# Patient Record
Sex: FEMALE | Race: WHITE | NOT HISPANIC OR LATINO | Employment: OTHER | URBAN - METROPOLITAN AREA
[De-identification: names, ages, dates, MRNs, and addresses within clinical notes are randomized per-mention and may not be internally consistent; named-entity substitution may affect disease eponyms.]

---

## 2017-01-13 ENCOUNTER — TRANSFERRED RECORDS (OUTPATIENT)
Dept: HEALTH INFORMATION MANAGEMENT | Facility: CLINIC | Age: 32
End: 2017-01-13

## 2017-02-08 ENCOUNTER — APPOINTMENT (OUTPATIENT)
Dept: ULTRASOUND IMAGING | Facility: CLINIC | Age: 32
End: 2017-02-08
Attending: PHYSICIAN ASSISTANT
Payer: COMMERCIAL

## 2017-02-08 ENCOUNTER — HOSPITAL ENCOUNTER (EMERGENCY)
Facility: CLINIC | Age: 32
Discharge: HOME | End: 2017-02-08
Attending: PHYSICIAN ASSISTANT | Admitting: PHYSICIAN ASSISTANT
Payer: COMMERCIAL

## 2017-02-08 VITALS
HEIGHT: 64 IN | DIASTOLIC BLOOD PRESSURE: 82 MMHG | OXYGEN SATURATION: 98 % | HEART RATE: 76 BPM | RESPIRATION RATE: 15 BRPM | SYSTOLIC BLOOD PRESSURE: 117 MMHG | BODY MASS INDEX: 25.1 KG/M2 | TEMPERATURE: 98.4 F | WEIGHT: 147 LBS

## 2017-02-08 DIAGNOSIS — G89.29 CHRONIC PELVIC PAIN IN FEMALE: ICD-10-CM

## 2017-02-08 DIAGNOSIS — R10.2 CHRONIC PELVIC PAIN IN FEMALE: ICD-10-CM

## 2017-02-08 DIAGNOSIS — Z87.42 HX OF ENDOMETRIOSIS: ICD-10-CM

## 2017-02-08 LAB
ALBUMIN SERPL-MCNC: 3.6 G/DL (ref 3.4–5)
ALBUMIN UR-MCNC: NEGATIVE MG/DL
ALP SERPL-CCNC: 78 U/L (ref 40–150)
ALT SERPL W P-5'-P-CCNC: 18 U/L (ref 0–50)
ANION GAP SERPL CALCULATED.3IONS-SCNC: 9 MMOL/L (ref 3–14)
APPEARANCE UR: CLEAR
AST SERPL W P-5'-P-CCNC: 19 U/L (ref 0–45)
BASOPHILS # BLD AUTO: 0.1 10E9/L (ref 0–0.2)
BASOPHILS NFR BLD AUTO: 0.6 %
BILIRUB SERPL-MCNC: 0.2 MG/DL (ref 0.2–1.3)
BILIRUB UR QL STRIP: NEGATIVE
BUN SERPL-MCNC: 9 MG/DL (ref 7–30)
CALCIUM SERPL-MCNC: 8.8 MG/DL (ref 8.5–10.1)
CHLORIDE SERPL-SCNC: 106 MMOL/L (ref 94–109)
CO2 SERPL-SCNC: 25 MMOL/L (ref 20–32)
COLOR UR AUTO: YELLOW
CREAT SERPL-MCNC: 0.66 MG/DL (ref 0.52–1.04)
DIFFERENTIAL METHOD BLD: NORMAL
EOSINOPHIL # BLD AUTO: 0.1 10E9/L (ref 0–0.7)
EOSINOPHIL NFR BLD AUTO: 1.3 %
ERYTHROCYTE [DISTWIDTH] IN BLOOD BY AUTOMATED COUNT: 13.7 % (ref 10–15)
GFR SERPL CREATININE-BSD FRML MDRD: NORMAL ML/MIN/1.7M2
GLUCOSE SERPL-MCNC: 84 MG/DL (ref 70–99)
GLUCOSE UR STRIP-MCNC: NEGATIVE MG/DL
HCT VFR BLD AUTO: 42.2 % (ref 35–47)
HGB BLD-MCNC: 14.1 G/DL (ref 11.7–15.7)
HGB UR QL STRIP: ABNORMAL
IMM GRANULOCYTES # BLD: 0 10E9/L (ref 0–0.4)
IMM GRANULOCYTES NFR BLD: 0.2 %
KETONES UR STRIP-MCNC: NEGATIVE MG/DL
LEUKOCYTE ESTERASE UR QL STRIP: NEGATIVE
LIPASE SERPL-CCNC: 154 U/L (ref 73–393)
LYMPHOCYTES # BLD AUTO: 2.7 10E9/L (ref 0.8–5.3)
LYMPHOCYTES NFR BLD AUTO: 26.8 %
MCH RBC QN AUTO: 29.6 PG (ref 26.5–33)
MCHC RBC AUTO-ENTMCNC: 33.4 G/DL (ref 31.5–36.5)
MCV RBC AUTO: 89 FL (ref 78–100)
MONOCYTES # BLD AUTO: 0.7 10E9/L (ref 0–1.3)
MONOCYTES NFR BLD AUTO: 6.5 %
MUCOUS THREADS #/AREA URNS LPF: PRESENT /LPF
NEUTROPHILS # BLD AUTO: 6.6 10E9/L (ref 1.6–8.3)
NEUTROPHILS NFR BLD AUTO: 64.6 %
NITRATE UR QL: NEGATIVE
PH UR STRIP: 5.5 PH (ref 5–7)
PLATELET # BLD AUTO: 288 10E9/L (ref 150–450)
POTASSIUM SERPL-SCNC: 3.6 MMOL/L (ref 3.4–5.3)
PROT SERPL-MCNC: 7.5 G/DL (ref 6.8–8.8)
RBC # BLD AUTO: 4.76 10E12/L (ref 3.8–5.2)
RBC #/AREA URNS AUTO: 105 /HPF (ref 0–2)
SODIUM SERPL-SCNC: 140 MMOL/L (ref 133–144)
SP GR UR STRIP: 1.02 (ref 1–1.03)
SQUAMOUS #/AREA URNS AUTO: 1 /HPF (ref 0–1)
URN SPEC COLLECT METH UR: ABNORMAL
UROBILINOGEN UR STRIP-MCNC: NORMAL MG/DL (ref 0–2)
WBC # BLD AUTO: 10.2 10E9/L (ref 4–11)
WBC #/AREA URNS AUTO: 2 /HPF (ref 0–2)

## 2017-02-08 PROCEDURE — 96374 THER/PROPH/DIAG INJ IV PUSH: CPT

## 2017-02-08 PROCEDURE — 83690 ASSAY OF LIPASE: CPT | Performed by: PHYSICIAN ASSISTANT

## 2017-02-08 PROCEDURE — 93976 VASCULAR STUDY: CPT

## 2017-02-08 PROCEDURE — 80053 COMPREHEN METABOLIC PANEL: CPT | Performed by: PHYSICIAN ASSISTANT

## 2017-02-08 PROCEDURE — 25000132 ZZH RX MED GY IP 250 OP 250 PS 637: Performed by: PHYSICIAN ASSISTANT

## 2017-02-08 PROCEDURE — 99285 EMERGENCY DEPT VISIT HI MDM: CPT | Mod: 25

## 2017-02-08 PROCEDURE — 85025 COMPLETE CBC W/AUTO DIFF WBC: CPT | Performed by: PHYSICIAN ASSISTANT

## 2017-02-08 PROCEDURE — 25000128 H RX IP 250 OP 636: Performed by: PHYSICIAN ASSISTANT

## 2017-02-08 PROCEDURE — 81001 URINALYSIS AUTO W/SCOPE: CPT | Performed by: PHYSICIAN ASSISTANT

## 2017-02-08 RX ORDER — OXYCODONE AND ACETAMINOPHEN 5; 325 MG/1; MG/1
2 TABLET ORAL ONCE
Status: COMPLETED | OUTPATIENT
Start: 2017-02-08 | End: 2017-02-08

## 2017-02-08 RX ORDER — ONDANSETRON 2 MG/ML
4 INJECTION INTRAMUSCULAR; INTRAVENOUS ONCE
Status: COMPLETED | OUTPATIENT
Start: 2017-02-08 | End: 2017-02-08

## 2017-02-08 RX ORDER — HYDROMORPHONE HYDROCHLORIDE 2 MG/1
2 TABLET ORAL ONCE
Status: COMPLETED | OUTPATIENT
Start: 2017-02-08 | End: 2017-02-08

## 2017-02-08 RX ADMIN — OXYCODONE HYDROCHLORIDE AND ACETAMINOPHEN 2 TABLET: 5; 325 TABLET ORAL at 16:42

## 2017-02-08 RX ADMIN — HYDROMORPHONE HYDROCHLORIDE 2 MG: 2 TABLET ORAL at 14:40

## 2017-02-08 RX ADMIN — ONDANSETRON 4 MG: 2 SOLUTION INTRAMUSCULAR; INTRAVENOUS at 14:40

## 2017-02-08 ASSESSMENT — ENCOUNTER SYMPTOMS
HEMATURIA: 0
ROS GI COMMENTS: POSITIVE FOR DRY HEAVING
ABDOMINAL PAIN: 1
DYSURIA: 0
FREQUENCY: 0
NAUSEA: 1

## 2017-02-08 NOTE — ED AVS SNAPSHOT
Emergency Department    [address] [facility] [address]    Phone:  [phone]    Fax:  [phone]                                       Julia Smiht   MRN: [id number]    Department:   Emergency Department   Date of Visit:  2/8/2017           Patient Information     Date Of Birth          1985        Your diagnoses for this visit were:     Chronic pelvic pain in female     Hx of endometriosis        You were seen by Winnie Marcum PA-C.      Follow-up Information     Follow up with  Emergency Department.    Specialty:  EMERGENCY MEDICINE    Why:  If symptoms worsen    Contact information:    [address] [facility] [address] [phone]        Follow up with pain clinic In 2 days.        Follow up with your OB GYN.    Why:  ASAP        Discharge Instructions       Rest, pain regimen at home, follow up with pain clinic on Friday.   Return for worsening pain or any new concerns.         Discharge References/Attachments     ENDOMETRIOSIS (ENGLISH)      Future Appointments        Provider Department Dept Phone Center    2/10/2017 8:40 AM [place] Tong [place] [facility] [place] [phone]       24 Hour Appointment Hotline       To make an appointment at any [facility], call [phone] ([phone]). If you don't have a family doctor or clinic, we will help you find one. [facility] are conveniently located to serve the needs of you and your family.             Review of your medicines      Our records show that you are taking the medicines listed below. If these are incorrect, please call your family doctor or clinic.        Dose / Directions Last dose taken    cefPROZIL 500 MG tablet   Commonly known as:  CEFZIL   Dose:  500 mg   Quantity:  20 tablet        Take 1 tablet (500 mg) by mouth 2 times daily   Refills:  0        celecoxib 200 MG capsule   Commonly known as:  celeBREX   Dose:  200 mg   Quantity:  30 capsule         Take 1 capsule (200 mg) by mouth 2 times daily   Refills:  0        lamoTRIgine 150 MG tablet   Commonly known as:  LaMICtal        Refills:  1        LYRICA 50 MG capsule   Generic drug:  pregabalin        TK 1 C PO TID.   Refills:  0        methocarbamol 500 MG tablet   Commonly known as:  ROBAXIN   Dose:  1000 mg   Quantity:  30 tablet        Take 2 tablets (1,000 mg) by mouth 3 times daily as needed for muscle spasms   Refills:  0        morphine 15 MG 12 hr tablet   Commonly known as:  MS CONTIN        TK 1 T PO  Q 12 H.   Refills:  0        ondansetron 4 MG tablet   Commonly known as:  ZOFRAN   Dose:  4 mg   Quantity:  18 tablet        Take 1 tablet (4 mg) by mouth every 12 hours as needed for nausea   Refills:  0        predniSONE 20 MG tablet   Commonly known as:  DELTASONE   Dose:  20 mg   Quantity:  10 tablet        Take 1 tablet (20 mg) by mouth 2 times daily   Refills:  0        sertraline 100 MG tablet   Commonly known as:  ZOLOFT   Dose:  100 mg   Quantity:  30 tablet        Take 1 tablet (100 mg) by mouth At Bedtime   Refills:  0        SUDAFED PO        Take by mouth as needed for congestion   Refills:  0        traZODone 50 MG tablet   Commonly known as:  DESYREL        Take by mouth nightly as needed for sleep   Refills:  0        VITAMIN D (CHOLECALCIFEROL) PO   Dose:  4000 Units        Take 4,000 Units by mouth daily   Refills:  0                Procedures and tests performed during your visit     CBC with platelets differential    Comprehensive metabolic panel    IV access    Lipase    UA with Microscopic    US Pelvic Complete w Transvaginal & Abd/Pel Duplex Limited      Orders Needing Specimen Collection     None      Pending Results     No orders found from 2/7/2017 to 2/9/2017.            Pending Culture Results     No orders found from 2/7/2017 to 2/9/2017.       Test Results from your hospital stay           2/8/2017  2:08 PM - Interface, Flexilab Results      Component Results      Component Value Ref Range & Units Status    WBC 10.2 4.0 - 11.0 10e9/L Final    RBC Count 4.76 3.8 - 5.2 10e12/L Final    Hemoglobin 14.1 11.7 - 15.7 g/dL Final    Hematocrit 42.2 35.0 - 47.0 % Final    MCV 89 78 - 100 fl Final    MCH 29.6 26.5 - 33.0 pg Final    MCHC 33.4 31.5 - 36.5 g/dL Final    RDW 13.7 10.0 - 15.0 % Final    Platelet Count 288 150 - 450 10e9/L Final    Diff Method Automated Method  Final    % Neutrophils 64.6 % Final    % Lymphocytes 26.8 % Final    % Monocytes 6.5 % Final    % Eosinophils 1.3 % Final    % Basophils 0.6 % Final    % Immature Granulocytes 0.2 % Final    Absolute Neutrophil 6.6 1.6 - 8.3 10e9/L Final    Absolute Lymphocytes 2.7 0.8 - 5.3 10e9/L Final    Absolute Monocytes 0.7 0.0 - 1.3 10e9/L Final    Absolute Eosinophils 0.1 0.0 - 0.7 10e9/L Final    Absolute Basophils 0.1 0.0 - 0.2 10e9/L Final    Abs Immature Granulocytes 0.0 0 - 0.4 10e9/L Final         2/8/2017  2:24 PM - Interface, Flexilab Results      Component Results     Component Value Ref Range & Units Status    Sodium 140 133 - 144 mmol/L Final    Potassium 3.6 3.4 - 5.3 mmol/L Final    Chloride 106 94 - 109 mmol/L Final    Carbon Dioxide 25 20 - 32 mmol/L Final    Anion Gap 9 3 - 14 mmol/L Final    Glucose 84 70 - 99 mg/dL Final    Urea Nitrogen 9 7 - 30 mg/dL Final    Creatinine 0.66 0.52 - 1.04 mg/dL Final    GFR Estimate >90  Non  GFR Calc   >60 mL/min/1.7m2 Final    GFR Estimate If Black >90   GFR Calc   >60 mL/min/1.7m2 Final    Calcium 8.8 8.5 - 10.1 mg/dL Final    Bilirubin Total 0.2 0.2 - 1.3 mg/dL Final    Albumin 3.6 3.4 - 5.0 g/dL Final    Protein Total 7.5 6.8 - 8.8 g/dL Final    Alkaline Phosphatase 78 40 - 150 U/L Final    ALT 18 0 - 50 U/L Final    AST 19 0 - 45 U/L Final         2/8/2017  2:22 PM - Interface, Flexilab Results      Component Results     Component Value Ref Range & Units Status    Lipase 154 73 - 393 U/L Final         2/8/2017  2:35 PM - Interface,  Flexilab Results      Component Results     Component Value Ref Range & Units Status    Color Urine Yellow  Final    Appearance Urine Clear  Final    Glucose Urine Negative NEG mg/dL Final    Bilirubin Urine Negative NEG Final    Ketones Urine Negative NEG mg/dL Final    Specific Gravity Urine 1.020 1.003 - 1.035 Final    Blood Urine Moderate (A) NEG Final    pH Urine 5.5 5.0 - 7.0 pH Final    Protein Albumin Urine Negative NEG mg/dL Final    Urobilinogen mg/dL Normal 0.0 - 2.0 mg/dL Final    Nitrite Urine Negative NEG Final    Leukocyte Esterase Urine Negative NEG Final    Source Midstream Urine  Final    WBC Urine 2 0 - 2 /HPF Final    RBC Urine 105 (H) 0 - 2 /HPF Final    Squamous Epithelial /HPF Urine 1 0 - 1 /HPF Final    Mucous Urine Present (A) NEG /LPF Final         2/8/2017  4:40 PM - Interface, Radiant Ib      Narrative     ULTRASOUND  PELVIS COMPLETE WITH TRANSVAGINAL AND DOPPLER LIMITED  2/8/2017 3:14 PM     HISTORY: Previous hysterectomy and left oophorectomy. Evaluate for  ovarian torsion. Pelvic pain.     FINDINGS:  Transvaginal images were performed to better evaluate the  patient's uterus, ovaries and endometrial stripe.    The uterus is surgically absent. The right ovary is normal measuring  2.2 x 2.2 x 2.1 cm. Small follicles are present. The left ovary is  surgically absent. Color Doppler waveform analysis demonstrates normal  arterial and venous waveforms within the right ovary. No adnexal  masses are present. No free pelvic fluid is present.        Impression     IMPRESSION: No ultrasound evidence of right ovarian torsion. Right  ovary is unremarkable. Scattered small follicles are present. Uterus  and left ovary are surgically absent.    ANIBAL VILLASENOR MD                Clinical Quality Measure: Blood Pressure Screening     Your blood pressure was checked while you were in the emergency department today. The last reading we obtained was  BP: 105/74 mmHg . Please read the guidelines below about  what these numbers mean and what you should do about them.  If your systolic blood pressure (the top number) is less than 120 and your diastolic blood pressure (the bottom number) is less than 80, then your blood pressure is normal. There is nothing more that you need to do about it.  If your systolic blood pressure (the top number) is 120-139 or your diastolic blood pressure (the bottom number) is 80-89, your blood pressure may be higher than it should be. You should have your blood pressure rechecked within a year by a primary care provider.  If your systolic blood pressure (the top number) is 140 or greater or your diastolic blood pressure (the bottom number) is 90 or greater, you may have high blood pressure. High blood pressure is treatable, but if left untreated over time it can put you at risk for heart attack, stroke, or kidney failure. You should have your blood pressure rechecked by a primary care provider within the next 4 weeks.  If your provider in the emergency department today gave you specific instructions to follow-up with your doctor or provider even sooner than that, you should follow that instruction and not wait for up to 4 weeks for your follow-up visit.        Thank you for choosing [facility]       Thank you for choosing [facility] for your care. Our goal is always to provide you with excellent care. Hearing back from our patients is one way we can continue to improve our services. Please take a few minutes to complete the written survey that you may receive in the mail after you visit with us. Thank you!        [employer] Information     [employer] gives you secure access to your electronic health record. If you see a primary care provider, you can also send messages to your care team and make appointments. If you have questions, please call your primary care clinic.  If you do not have a primary care provider, please call [phone] and they will assist you.        Care EveryWhere ID     This is  your Care EveryWhere ID. This could be used by other organizations to access your [place] medical records  [id number]        After Visit Summary       This is your record. Keep this with you and show to your community pharmacist(s) and doctor(s) at your next visit.

## 2017-02-08 NOTE — ED NOTES
Bed: ED29  Expected date:   Expected time:   Means of arrival:   Comments:  Kalia [address], 30yo F Abd pain, yellow, 10 mins

## 2017-02-08 NOTE — DISCHARGE INSTRUCTIONS
Rest, pain regimen at home, follow up with pain clinic on Friday.   Return for worsening pain or any new concerns.

## 2017-02-08 NOTE — ED PROVIDER NOTES
History     Chief Complaint:  Abdominal Pain      HPI   Julia Smith is a 31 year old female with a history of endometriosis resulting in chronic pelvic pain s/p hysterectomy including cervix and left oophorectomy who presents with abdominal pain. The patient states that she has had increased lower abdominal/pelvic pain over the last 3 days, consistent with previous exacerbations of her endometriosis. She usually manages her pain with two daily doses of extended release morphine, which she took a dose of prior to arrival without much relief.  She also reports some nausea and dry heaving. She denies vaginal discharge/bleeding, back pain, dysuria or other urinary symptoms.      Allergies:  Decadron  Depakote  Ibuprofen     Medications:     Methocarbamol (robaxin) 500 mg tablet  Pseudoephedrine hcl (sudafed po)  Cefprozil (cefzil) 500 mg tablet  Prednisone (deltasone) 20 mg tablet  Lamotrigine (lamictal) 150 mg tablet  Morphine (ms contin) 15 mg 12 hr tablet  Lyrica 50 mg capsule  Ondansetron (zofran) 4 mg tablet  Sertraline (zoloft) 100 mg tablet  Celecoxib (celebrex) 200 mg capsule  Trazodone (desyrel) 50 mg tablet  Vitamin d, cholecalciferol,     Past Medical History:    Seroma  GERD  HLD  Non-morbid obesity  Chronic pain disorder  Proteinuria  Depression  Impaired Fasting Glucose  Tobacco abuse  Anemia  Bipolar 2 Disorder  Endometriosis  Anxiety  Constipation  PCOS  Fibromyalgia  TMJ dislocation  Migraine  Panic Attacks  Hyperlipidemia     Past Surgical History:    DaVinci Pelvic procedure x2  DaVinci assisted ablation/excision of endometriosis  DaVinci salpingectomy, right  DaVinci hysterectomy, total  DaVinci salpingo-oophorectomy, left  Appendectomy  D&C, hysteroscopy x2  Laparoscopy x2  Cystoscopy x2    Family History:     Depression    Social History:  Marital Status:   Presents to the ED alone  Tobacco Use: Current everyday smoker 0.5 ppd for 5 years.   Alcohol Use: rarely  PCP: [facility]  "Clinic      Review of Systems   Gastrointestinal: Positive for nausea and abdominal pain.        Positive for dry heaving   Genitourinary: Negative for dysuria, urgency, frequency, hematuria, decreased urine volume, vaginal bleeding and vaginal discharge.   All other systems reviewed and are negative.      Physical Exam     Patient Vitals for the past 24 hrs:   BP Temp Temp src Pulse Heart Rate Resp SpO2 Height Weight   02/08/17 1714 117/82 mmHg - - 76 - 15 98 % - -   02/08/17 1538 105/74 mmHg - - 64 - 15 96 % - -   02/08/17 1400 95/77 mmHg - - - - - 98 % - -   02/08/17 1355 102/87 mmHg 98.4  F (36.9  C) Oral - 79 16 97 % 1.626 m (5' 4\") 66.679 kg (147 lb)       Physical Exam  Nursing note and vitals reviewed.     GENERAL: Alert, mild distress, non toxic appearing; tearful on exam .  HEENT: Normal conjunctiva. No scleral icterus. MMM.   NECK: Supple.  CARDIAC: Normal rate and regular rhythm. Normal heart sounds. No murmurs, rubs, or gallops appreciated.  PULMONARY: CTA bilaterally. Normal breath sounds. No wheezing, crackles, or rhonchi appreciated.  ABDOMEN: Soft, non distended abdomen. Mild lower/suprapubic abdominal tenderness. No rebound or guarding.   NEURO: Alert and oriented. Non-focal.   MUSCULOSKELETAL: Normal range of motion. No peripheral edema. No CVA tenderness.   SKIN: Skin is warm and dry. No rashes. No pallor or jaundice.   PSYCH: Normal affect and mood. Does not express suicidal ideation or plan.       Emergency Department Course     Imaging:  US Pelvic Complete w Transvaginal & Abd/Pel Duplex Limited:  No ultrasound evidence of right ovarian torsion. Right  ovary is unremarkable. Scattered small follicles are present. Uterus  and left ovary are surgically absent.  Report per radiology.      Radiographic findings were communicated with the patient who voiced understanding of the findings.      Laboratory:  UA: Clear yellow urine, blood moderate,  (H), mucous present otherwise WNL     CBC:  " WBC 10.2, HGB 14.1, , otherwise WNL     CMP: WNL (Creatinine 0.66)     Lipase: 154    Interventions:  (1440) Dilaudid, 2 mg, PO  (1440) Zofran, 4 mg, IV injection   (1642) Oxycodone acetaminophen, 5-325 mg, PO x 2     Emergency Department Course:  Nursing notes and vitals reviewed.  I performed an exam of the patient as documented above.   Blood was drawn from the patient. This was sent for laboratory testing, findings above.   Urine sample was obtained and sent for laboratory analysis, findings above.   The patient was sent for a US Pelvic Complete w Transvaginal & Abd/Pel Duplex Limited while in the emergency department, findings above.     (1633) I rechecked on the patient. Findings and plan explained to the patient. Patient discharged home with instructions regarding supportive care, medications, and reasons to return. The importance of close follow-up was reviewed. I personally reviewed the laboratory results with the patient and answered all related questions prior to discharge.       Impression & Plan      Medical Decision Making:  This Sister a 31 year old female with a history of chronic pelvic pain related to endometriosis s/p hysterectomy, bilateral salpingectomy, and left oophorectomy who presents with concern for worsening pelvic pain over the past few days. Here she is afebrile, hemodynamically stable and non-toxic appearing. Ultrasound of the pelvis shows no signs of ovarian torsion on the right side. No free fluid in the pelvis. Laboratory workup is grossly unremarkable including a normal white count, normal hemoglobin, normal electrolytes. No elevation in liver function tests or lipase concerning for upper abdominal etiology. Urine shows blood but no signs of significant infection. Kidney function WNL. She is not bleeding vaginally. Her presentation is not consistent with ureteral stone, thus I do not feel that she needs a CT scan at this point as she has had several in the past and risks of  radiation outweigh the benefits. I reviewed this with her and recommend she follow up with PCP for repeat urinalysis to ensure this has resolved or if still present, may require further evaluation. Back in September shortly after her hysterectomy she had some issues with infection. She has not had a fever, white count is normal, no concerning fluid collections on pelvic US, thus I have very low suspicion for pelvic abscess and do not feel CT is indicated.     She is already on extended release Morphine at home and is followed by the pain clinic, next apt in two days. She asked for IV narcotics, however I reviewed our chronic pain policy with the patient. She received oral dilaudid and percocet here with some improvement. She requested a few pain pills for home, but again, I reviewed our chronic pain policy and did not feel comfortable providing this. I advised her to follow up closely with OBGYN for further evaluation. Reviewed reasons to return to ED, including worsening symptoms or any new concerns. The patient was in agreement with plan and discharged in satisfactory condition with all questions answered.     Of note, in triage, when asked if she has thought about hurting herself, she responded yes. I reviewed this with her and she denies suicidal ideation or plan. She states when the pain gets more severe, she becomes more depressed. She did not want to see DEC at this time. I felt this was reasonable and she does not appear decompensated or requires hospitalization. She can contract for safety and will return if she develops suicidal ideation or plan.     Diagnosis:    ICD-10-CM    1. Chronic pelvic pain in female R10.2     G89.29    2. Hx of endometriosis Z87.42      Disposition:  Discharge to home.       Wilson LEIGH, am serving as a scribe on 2/8/2017 at 2:18 PM to personally document services performed by Winnie Marcum PA-C based on my observations and the provider's statements to me.     2/8/2017   [facility]  EMERGENCY DEPARTMENT        Winnie Marcum PA-C  02/09/17 0855

## 2017-02-08 NOTE — ED NOTES
Pt returned from US. Pain is unchanged after medication administered.  Awaiting US results. PA informed.

## 2017-02-08 NOTE — ED AVS SNAPSHOT
Emergency Department    [address] [facility] [address]    Phone:  [phone]    Fax:  [phone]                                       Julia Smith   MRN: [id number]    Department:   Emergency Department   Date of Visit:  2/8/2017           After Visit Summary Signature Page     I have received my discharge instructions, and my questions have been answered. I have discussed any challenges I see with this plan with the nurse or doctor.    ..........................................................................................................................................  Patient/Patient Representative Signature      ..........................................................................................................................................  Patient Representative Print Name and Relationship to Patient    ..................................................               ................................................  Date                                            Time    ..........................................................................................................................................  Reviewed by Signature/Title    ...................................................              ..............................................  Date                                                            Time

## 2017-02-27 ENCOUNTER — OFFICE VISIT (OUTPATIENT)
Dept: FAMILY MEDICINE | Facility: CLINIC | Age: 32
End: 2017-02-27
Payer: COMMERCIAL

## 2017-02-27 VITALS
HEART RATE: 115 BPM | OXYGEN SATURATION: 98 % | SYSTOLIC BLOOD PRESSURE: 90 MMHG | DIASTOLIC BLOOD PRESSURE: 60 MMHG | TEMPERATURE: 98.2 F | WEIGHT: 150.5 LBS | BODY MASS INDEX: 25.7 KG/M2 | HEIGHT: 64 IN

## 2017-02-27 DIAGNOSIS — R05.9 COUGH: ICD-10-CM

## 2017-02-27 DIAGNOSIS — R07.0 THROAT PAIN: Primary | ICD-10-CM

## 2017-02-27 DIAGNOSIS — J03.01 ACUTE RECURRENT STREPTOCOCCAL TONSILLITIS: ICD-10-CM

## 2017-02-27 LAB
DEPRECATED S PYO AG THROAT QL EIA: ABNORMAL
MICRO REPORT STATUS: ABNORMAL
SPECIMEN SOURCE: ABNORMAL

## 2017-02-27 PROCEDURE — 87880 STREP A ASSAY W/OPTIC: CPT | Performed by: PHYSICIAN ASSISTANT

## 2017-02-27 PROCEDURE — 99213 OFFICE O/P EST LOW 20 MIN: CPT | Performed by: PHYSICIAN ASSISTANT

## 2017-02-27 RX ORDER — BENZONATATE 100 MG/1
100 CAPSULE ORAL 3 TIMES DAILY PRN
Qty: 30 CAPSULE | Refills: 0 | Status: ON HOLD | OUTPATIENT
Start: 2017-02-27 | End: 2017-07-19

## 2017-02-27 NOTE — MR AVS SNAPSHOT
"              After Visit Summary   2/27/2017    Julia Smith    MRN: [id number]           Patient Information     Date Of Birth          1985        Visit Information        Provider Department      2/27/2017 8:40 AM Shoaib Ruiz PA-C [facility] [place]        Today's Diagnoses     Throat pain    -  1    Acute recurrent streptococcal tonsillitis        Cough           Follow-ups after your visit        Who to contact     If you have questions or need follow up information about today's clinic visit or your schedule please contact [facility] directly at [phone].  Normal or non-critical lab and imaging results will be communicated to you by [employer]hart, letter or phone within 4 business days after the clinic has received the results. If you do not hear from us within 7 days, please contact the clinic through [employer] or phone. If you have a critical or abnormal lab result, we will notify you by phone as soon as possible.  Submit refill requests through ChartITright or call your pharmacy and they will forward the refill request to us. Please allow 3 business days for your refill to be completed.          Additional Information About Your Visit        MyChart Information     ChartITright gives you secure access to your electronic health record. If you see a primary care provider, you can also send messages to your care team and make appointments. If you have questions, please call your primary care clinic.  If you do not have a primary care provider, please call [phone] and they will assist you.        Care EveryWhere ID     This is your Care EveryWhere ID. This could be used by other organizations to access your [facility] medical records  [id number]        Your Vitals Were     Pulse Temperature Height Last Period Pulse Oximetry Breastfeeding?    115 98.2  F (36.8  C) (Tympanic) 5' 4\" (1.626 m) 01/03/2014 98% No    BMI (Body Mass Index)                   25.83 kg/m2         "    Blood Pressure from Last 3 Encounters:   02/27/17 90/60   02/08/17 117/82   12/11/16 116/70    Weight from Last 3 Encounters:   02/27/17 150 lb 8 oz (68.3 kg)   02/08/17 147 lb (66.7 kg)   12/11/16 145 lb (65.8 kg)              We Performed the Following     Strep, Rapid Screen          Today's Medication Changes          These changes are accurate as of: 2/27/17  9:43 AM.  If you have any questions, ask your nurse or doctor.               Start taking these medicines.        Dose/Directions    benzonatate 100 MG capsule   Commonly known as:  TESSALON   Used for:  Cough   Started by:  Shoaib Ruiz PA-C        Dose:  100 mg   Take 1 capsule (100 mg) by mouth 3 times daily as needed   Quantity:  30 capsule   Refills:  0       penicillin G benzathine [id number] UNIT/2ML injection   Commonly known as:  BICILLIN L-A   Used for:  Acute recurrent streptococcal tonsillitis   Started by:  Shoaib Ruiz PA-C        1.2 million units injected IM x 1   Quantity:  2 mL   Refills:  0         Stop taking these medicines if you haven't already. Please contact your care team if you have questions.     cefPROZIL 500 MG tablet   Commonly known as:  CEFZIL   Stopped by:  Shoaib Ruiz PA-C           celecoxib 200 MG capsule   Commonly known as:  celeBREX   Stopped by:  Shoaib Ruiz PA-C           methocarbamol 500 MG tablet   Commonly known as:  ROBAXIN   Stopped by:  Shoaib Ruiz PA-C           morphine 15 MG 12 hr tablet   Commonly known as:  MS CONTIN   Stopped by:  Shoaib Ruiz PA-C           ondansetron 4 MG tablet   Commonly known as:  ZOFRAN   Stopped by:  Shoaib Ruiz PA-C           predniSONE 20 MG tablet   Commonly known as:  DELTASONE   Stopped by:  Shoaib Ruiz PA-C           SUDAFED PO   Stopped by:  Shoaib Ruiz PA-C           traZODone 50 MG tablet   Commonly known as:  DESYREL   Stopped by:  Shoaib Ruiz PA-C                 Where to get your medicines      These medications were sent to [facility] [place] - [place], [place] - [address]  [address], [place] [address]     Phone:  [phone]     benzonatate 100 MG capsule    penicillin G benzathine [id number] UNIT/2ML injection                Primary Care Provider    Physician No Ref-Primary       No address on file        Thank you!     Thank you for choosing [facility] [place]  for your care. Our goal is always to provide you with excellent care. Hearing back from our patients is one way we can continue to improve our services. Please take a few minutes to complete the written survey that you may receive in the mail after your visit with us. Thank you!             Your Updated Medication List - Protect others around you: Learn how to safely use, store and throw away your medicines at www.disposemymeds.org.          This list is accurate as of: 2/27/17  9:43 AM.  Always use your most recent med list.                   Brand Name Dispense Instructions for use    benzonatate 100 MG capsule    TESSALON    30 capsule    Take 1 capsule (100 mg) by mouth 3 times daily as needed       lamoTRIgine 150 MG tablet    LaMICtal         LYRICA 50 MG capsule   Generic drug:  pregabalin      TK 1 C PO TID.       penicillin G benzathine [id number] UNIT/2ML injection    BICILLIN L-A    2 mL    1.2 million units injected IM x 1       sertraline 100 MG tablet    ZOLOFT    30 tablet    Take 1 tablet (100 mg) by mouth At Bedtime       VITAMIN D (CHOLECALCIFEROL) PO      Take 4,000 Units by mouth daily

## 2017-02-27 NOTE — NURSING NOTE
"Chief Complaint   Patient presents with     Pharyngitis       Initial BP 90/60 (BP Location: Left arm, Patient Position: Chair, Cuff Size: Adult Large)  Pulse 115  Temp 98.2  F (36.8  C) (Tympanic)  Ht 5' 4\" (1.626 m)  Wt 150 lb 8 oz (68.3 kg)  LMP 01/03/2014  SpO2 98%  Breastfeeding? No  BMI 25.83 kg/m2 Estimated body mass index is 25.83 kg/(m^2) as calculated from the following:    Height as of this encounter: 5' 4\" (1.626 m).    Weight as of this encounter: 150 lb 8 oz (68.3 kg).  Medication Reconciliation: complete Mady Watts MA      "

## 2017-02-27 NOTE — PROGRESS NOTES
"  SUBJECTIVE:                                                    Julia Smith is a 32 year old female who presents to clinic today for the following health issues:      Acute Illness   Acute illness concerns: sore throat  Onset: 4-5 days    Fever: no    Chills/Sweats: YES    Headache (location?): YES    Sinus Pressure:YES    Conjunctivitis:  no    Ear Pain: YES: right    Rhinorrhea: YES    Congestion: YES    Sore Throat: YES     Cough: YES-productive of yellow sputum    Wheeze: no    Decreased Appetite: no    Nausea: no    Vomiting: no    Diarrhea:  no    Dysuria/Freq.: no    Fatigue/Achiness: YES    Sick/Strep Exposure: YES- her son is sick with similar symptoms     Therapies Tried and outcome: dayquil, nyquil, cough drops, sudafed.      noted some \" white spots\" on her tonsils over the weekend, has been having cold symptoms x 1 month with cough, more recently has been having sore throat and pain with swallowing. No rash, f/n/v/d.      Problem list and histories reviewed & adjusted, as indicated.  Additional history: as documented        ROS:  Constitutional, HEENT, cardiovascular, pulmonary, gi and gu systems are negative, except as otherwise noted.    OBJECTIVE:                                                    BP 90/60 (BP Location: Left arm, Patient Position: Chair, Cuff Size: Adult Large)  Pulse 115  Temp 98.2  F (36.8  C) (Tympanic)  Ht 5' 4\" (1.626 m)  Wt 150 lb 8 oz (68.3 kg)  LMP 01/03/2014  SpO2 98%  Breastfeeding? No  BMI 25.83 kg/m2  Body mass index is 25.83 kg/(m^2).  GENERAL: healthy, alert and no distress  EYES: Eyes grossly normal to inspection  HENT: ear canals and TM's normal, nose and mouth without ulcers or lesions, tonsils 3+ with erythema and exudates  NECK: bilateral cervical lymphadenopathy  RESP: lungs clear to auscultation - no rales, rhonchi or wheezes  CV: regular rate and rhythm, normal S1 S2, no S3 or S4, no murmur  Diagnostic Test Results:  Results for orders placed or performed " in visit on 02/27/17 (from the past 24 hour(s))   Strep, Rapid Screen   Result Value Ref Range    Specimen Description Throat     Rapid Strep A Screen (A)      POSITIVE: Group A Streptococcal antigen detected by immunoassay.    Micro Report Status FINAL 02/27/2017         ASSESSMENT/PLAN:                                                      1. Acute recurrent streptococcal tonsillitis  Patient prefers injection as she typically has vomiting with antibiotics.  Bicillin given today, supportive care treatments discussed and understood by patient.  - penicillin G benzathine (BICILLIN L-A) [id number] UNIT/2ML injection; 1.2 million units injected IM x 1  Dispense: 2 mL; Refill: 0    2. Throat pain  - Strep, Rapid Screen    3. Cough  - benzonatate (TESSALON) 100 MG capsule; Take 1 capsule (100 mg) by mouth 3 times daily as needed  Dispense: 30 capsule; Refill: 0    See Patient Instructions    Shoaib Ruiz PA-C  [facility]

## 2017-03-06 DIAGNOSIS — B37.31 YEAST INFECTION OF THE VAGINA: Primary | ICD-10-CM

## 2017-03-06 RX ORDER — FLUCONAZOLE 150 MG/1
150 TABLET ORAL ONCE
Qty: 1 TABLET | Refills: 0 | Status: SHIPPED | OUTPATIENT
Start: 2017-03-06 | End: 2017-03-06

## 2017-03-06 NOTE — TELEPHONE ENCOUNTER
Saw 2/27 and received PCN shot for strep    Now has yeast infection, asking for rx for Diflucan,     Vaginal area Itching, burning, slight discharge yellowish, more thickish, started with sx on Saturday    Has not tried any OTC medications    rx pended for Diflucan     Triage:  Call patient with response at [phone]    NITIN Walker

## 2017-03-26 ENCOUNTER — OFFICE VISIT (OUTPATIENT)
Dept: URGENT CARE | Facility: URGENT CARE | Age: 32
End: 2017-03-26
Payer: COMMERCIAL

## 2017-03-26 VITALS
OXYGEN SATURATION: 96 % | HEART RATE: 88 BPM | WEIGHT: 153.3 LBS | SYSTOLIC BLOOD PRESSURE: 95 MMHG | DIASTOLIC BLOOD PRESSURE: 62 MMHG | TEMPERATURE: 98.2 F | BODY MASS INDEX: 26.31 KG/M2

## 2017-03-26 DIAGNOSIS — B37.31 CANDIDIASIS OF VAGINA: Primary | ICD-10-CM

## 2017-03-26 DIAGNOSIS — N89.8 PRURITUS OF VAGINA: ICD-10-CM

## 2017-03-26 LAB
MICRO REPORT STATUS: ABNORMAL
SPECIMEN SOURCE: ABNORMAL
WET PREP SPEC: ABNORMAL

## 2017-03-26 PROCEDURE — 87210 SMEAR WET MOUNT SALINE/INK: CPT | Performed by: PHYSICIAN ASSISTANT

## 2017-03-26 PROCEDURE — 99214 OFFICE O/P EST MOD 30 MIN: CPT | Performed by: PHYSICIAN ASSISTANT

## 2017-03-26 RX ORDER — FLUCONAZOLE 150 MG/1
150 TABLET ORAL
Qty: 4 TABLET | Refills: 0 | Status: SHIPPED | OUTPATIENT
Start: 2017-03-26 | End: 2017-06-28

## 2017-03-26 NOTE — NURSING NOTE
"Chief Complaint   Patient presents with     Vaginal Problem     Itching x 1 week        Initial BP 95/62 (BP Location: Right arm, Patient Position: Chair, Cuff Size: Adult Regular)  Pulse 88  Temp 98.2  F (36.8  C) (Oral)  Wt 153 lb 4.8 oz (69.5 kg)  LMP 01/03/2014  SpO2 96%  BMI 26.31 kg/m2 Estimated body mass index is 26.31 kg/(m^2) as calculated from the following:    Height as of 2/27/17: 5' 4\" (1.626 m).    Weight as of this encounter: 153 lb 4.8 oz (69.5 kg).  Medication Reconciliation: complete    "

## 2017-03-26 NOTE — MR AVS SNAPSHOT
After Visit Summary   3/26/2017    Julia Smith    MRN: [id number]           Patient Information     Date Of Birth          1985        Visit Information        Provider Department      3/26/2017 4:00 PM Jalil Causey PA-C [facility]        Today's Diagnoses     Candidiasis of vagina    -  1    Pruritus of vagina           Follow-ups after your visit        Who to contact     If you have questions or need follow up information about today's clinic visit or your schedule please contact [facility] directly at [phone].  Normal or non-critical lab and imaging results will be communicated to you by [employer] Interactivehart, letter or phone within 4 business days after the clinic has received the results. If you do not hear from us within 7 days, please contact the clinic through [employer] or phone. If you have a critical or abnormal lab result, we will notify you by phone as soon as possible.  Submit refill requests through Emulis or call your pharmacy and they will forward the refill request to us. Please allow 3 business days for your refill to be completed.          Additional Information About Your Visit        MyChart Information     [employer] gives you secure access to your electronic health record. If you see a primary care provider, you can also send messages to your care team and make appointments. If you have questions, please call your primary care clinic.  If you do not have a primary care provider, please call [phone] and they will assist you.        Care EveryWhere ID     This is your Care EveryWhere ID. This could be used by other organizations to access your [facility] medical records  [id number]        Your Vitals Were     Pulse Temperature Last Period Pulse Oximetry BMI (Body Mass Index)       88 98.2  F (36.8  C) (Oral) 01/03/2014 96% 26.31 kg/m2        Blood Pressure from Last 3 Encounters:   03/26/17 95/62   02/27/17 90/60    02/08/17 117/82    Weight from Last 3 Encounters:   03/26/17 153 lb 4.8 oz (69.5 kg)   02/27/17 150 lb 8 oz (68.3 kg)   02/08/17 147 lb (66.7 kg)              We Performed the Following     Wet prep          Today's Medication Changes          These changes are accurate as of: 3/26/17 11:59 PM.  If you have any questions, ask your nurse or doctor.               Start taking these medicines.        Dose/Directions    fluconazole 150 MG tablet   Commonly known as:  DIFLUCAN   Used for:  Candidiasis of vagina   Started by:  Jalil Causey PA-C        Dose:  150 mg   Take 1 tablet (150 mg) by mouth every 3 days   Quantity:  4 tablet   Refills:  0            Where to get your medicines      These medications were sent to [facility] [address] [address]     Phone:  [phone]     fluconazole 150 MG tablet                Primary Care Provider    Physician No Ref-Primary       No address on file        Thank you!     Thank you for choosing [facility]  for your care. Our goal is always to provide you with excellent care. Hearing back from our patients is one way we can continue to improve our services. Please take a few minutes to complete the written survey that you may receive in the mail after your visit with us. Thank you!             Your Updated Medication List - Protect others around you: Learn how to safely use, store and throw away your medicines at www.disposemymeds.org.          This list is accurate as of: 3/26/17 11:59 PM.  Always use your most recent med list.                   Brand Name Dispense Instructions for use    benzonatate 100 MG capsule    TESSALON    30 capsule    Take 1 capsule (100 mg) by mouth 3 times daily as needed       fluconazole 150 MG tablet    DIFLUCAN    4 tablet    Take 1 tablet (150 mg) by mouth every 3 days       lamoTRIgine 150 MG tablet    LaMICtal         LYRICA 50 MG capsule    Generic drug:  pregabalin      TK 1 C PO TID.       penicillin G benzathine [id number] UNIT/2ML injection    BICILLIN L-A    2 mL    1.2 million units injected IM x 1       sertraline 100 MG tablet    ZOLOFT    30 tablet    Take 1 tablet (100 mg) by mouth At Bedtime       VITAMIN D (CHOLECALCIFEROL) PO      Take 4,000 Units by mouth daily

## 2017-03-26 NOTE — PROGRESS NOTES
SUBJECTIVE:    Julia Smith is a 32 year old female who presents with vaginal discharge and vulvar pruritis.    Onset of symptoms 5 day(s) ago, stable since.     Pain:none.     Vaginal bleeding: No      Vaginal symptoms: discharge described as white, local irritation and vulvar itching  Patient's last menstrual period was 01/03/2014.    Sexually active: yes, single partner, contraception - none and  no concern or symptoms of STDs  Predisposing factors: antibiotics  Hx of previous symptom: was treated with a single dose of Diflucan 3 weeks ago after being treated for her strep throat after her antibiotic injection.  She has had return of there symptoms with dysuria and use of OTC yeast cream.    Past Medical History:   Diagnosis Date     Bipolar 2 disorder (H) 12/17/2013    eleno and inocente mack     Endometriosis     confirmed by lap. [facility] [place]     Fibromyalgia      Gastro-oesophageal reflux disease      Migraine      Other chronic pain     PELVIC     Panic attacks      PCOS (polycystic ovarian syndrome)      TMJ (dislocation of temporomandibular joint)      Current Outpatient Prescriptions   Medication Sig Dispense Refill     fluconazole (DIFLUCAN) 150 MG tablet Take 1 tablet (150 mg) by mouth every 3 days 4 tablet 0     lamoTRIgine (LAMICTAL) 150 MG tablet   1     LYRICA 50 MG capsule TK 1 C PO TID.  0     sertraline (ZOLOFT) 100 MG tablet Take 1 tablet (100 mg) by mouth At Bedtime 30 tablet 0     VITAMIN D, CHOLECALCIFEROL, PO Take 4,000 Units by mouth daily       penicillin G benzathine (BICILLIN L-A) [id number] UNIT/2ML injection 1.2 million units injected IM x 1 (Patient not taking: Reported on 3/26/2017) 2 mL 0     benzonatate (TESSALON) 100 MG capsule Take 1 capsule (100 mg) by mouth 3 times daily as needed (Patient not taking: Reported on 3/26/2017) 30 capsule 0     Social History     Social History     Marital status:      Spouse name: N/A     Number of children: 0     Years  of education: N/A     Occupational History     nanny-babysit [employer]     Social History Main Topics     Smoking status: Current Every Day Smoker     Packs/day: 0.50     Years: 5.00     Types: Cigarettes     Last attempt to quit: 7/30/2016     Smokeless tobacco: Never Used     Alcohol use 0.0 oz/week     0 Standard drinks or equivalent per week      Comment: rarely     Drug use: No     Sexual activity: Yes     Partners: Male     Other Topics Concern      Service No     Blood Transfusions No     Occupational Exposure No     Sleep Concern No     Stress Concern No     Weight Concern Yes     25 pounds     Special Diet No     Exercise No     Seat Belt Yes     Parent/Sibling W/ Cabg, Mi Or Angioplasty Before 65f 55m? No     Social History Narrative       ROS:   Review of systems negative except as stated above.    OBJECTIVE:  BP 95/62 (BP Location: Right arm, Patient Position: Chair, Cuff Size: Adult Regular)  Pulse 88  Temp 98.2  F (36.8  C) (Oral)  Wt 153 lb 4.8 oz (69.5 kg)  LMP 01/03/2014  SpO2 96%  BMI 26.31 kg/m2  no adenexal tenderness or masses noted, vaginal discharge: white and OTC monistat cream in vaginal vault,  S/P hysterectomy with no uterus. exam chaperoned by nurse.  GENERAL APPEARANCE: healthy, alert and no distress  CV: regular rates and rhythm, normal S1 S2, no murmur noted  ABDOMEN:  soft, nontender, no HSM or masses and bowel sounds normal  BACK: No CVA tenderness  SKIN: no suspicious lesions or rashes    LAB:  Results for orders placed or performed in visit on 03/26/17   Wet prep   Result Value Ref Range    Specimen Description Vagina     Wet Prep (A)      No Trichomonas seen  No clue cells seen  Yeast seen      Micro Report Status FINAL 03/26/2017        ASSESSMENT:  Vaginitis:  yeast    PLAN:    Diflucan as written.  One week treatment written.  Follow up with Primary Care Provider if any complications or sequelae present.

## 2017-04-24 ENCOUNTER — TRANSFERRED RECORDS (OUTPATIENT)
Dept: HEALTH INFORMATION MANAGEMENT | Facility: CLINIC | Age: 32
End: 2017-04-24

## 2017-05-03 ENCOUNTER — HOSPITAL ENCOUNTER (EMERGENCY)
Facility: CLINIC | Age: 32
Discharge: HOME | End: 2017-05-03
Attending: PHYSICIAN ASSISTANT | Admitting: PHYSICIAN ASSISTANT
Payer: COMMERCIAL

## 2017-05-03 ENCOUNTER — APPOINTMENT (OUTPATIENT)
Dept: CT IMAGING | Facility: CLINIC | Age: 32
End: 2017-05-03
Attending: PHYSICIAN ASSISTANT
Payer: COMMERCIAL

## 2017-05-03 VITALS
SYSTOLIC BLOOD PRESSURE: 106 MMHG | TEMPERATURE: 98.2 F | OXYGEN SATURATION: 96 % | DIASTOLIC BLOOD PRESSURE: 75 MMHG | WEIGHT: 147 LBS | HEIGHT: 64 IN | RESPIRATION RATE: 16 BRPM | BODY MASS INDEX: 25.1 KG/M2

## 2017-05-03 DIAGNOSIS — R19.7 NAUSEA, VOMITING AND DIARRHEA: ICD-10-CM

## 2017-05-03 DIAGNOSIS — R10.32 ABDOMINAL PAIN, LEFT LOWER QUADRANT: ICD-10-CM

## 2017-05-03 DIAGNOSIS — R11.2 NAUSEA, VOMITING AND DIARRHEA: ICD-10-CM

## 2017-05-03 LAB
ALBUMIN SERPL-MCNC: 3.7 G/DL (ref 3.4–5)
ALBUMIN UR-MCNC: NEGATIVE MG/DL
ALP SERPL-CCNC: 73 U/L (ref 40–150)
ALT SERPL W P-5'-P-CCNC: 18 U/L (ref 0–50)
ANION GAP SERPL CALCULATED.3IONS-SCNC: 8 MMOL/L (ref 3–14)
APPEARANCE UR: CLEAR
AST SERPL W P-5'-P-CCNC: 7 U/L (ref 0–45)
BASOPHILS # BLD AUTO: 0 10E9/L (ref 0–0.2)
BASOPHILS NFR BLD AUTO: 0.3 %
BILIRUB SERPL-MCNC: 0.2 MG/DL (ref 0.2–1.3)
BILIRUB UR QL STRIP: NEGATIVE
BUN SERPL-MCNC: 9 MG/DL (ref 7–30)
CALCIUM SERPL-MCNC: 8.9 MG/DL (ref 8.5–10.1)
CHLORIDE SERPL-SCNC: 106 MMOL/L (ref 94–109)
CO2 SERPL-SCNC: 26 MMOL/L (ref 20–32)
COLOR UR AUTO: YELLOW
CREAT SERPL-MCNC: 0.7 MG/DL (ref 0.52–1.04)
DIFFERENTIAL METHOD BLD: NORMAL
EOSINOPHIL # BLD AUTO: 0.1 10E9/L (ref 0–0.7)
EOSINOPHIL NFR BLD AUTO: 1.2 %
ERYTHROCYTE [DISTWIDTH] IN BLOOD BY AUTOMATED COUNT: 13.6 % (ref 10–15)
GFR SERPL CREATININE-BSD FRML MDRD: ABNORMAL ML/MIN/1.7M2
GLUCOSE SERPL-MCNC: 114 MG/DL (ref 70–99)
GLUCOSE UR STRIP-MCNC: NEGATIVE MG/DL
HCT VFR BLD AUTO: 38.3 % (ref 35–47)
HGB BLD-MCNC: 13.1 G/DL (ref 11.7–15.7)
HGB UR QL STRIP: NEGATIVE
IMM GRANULOCYTES # BLD: 0 10E9/L (ref 0–0.4)
IMM GRANULOCYTES NFR BLD: 0.3 %
KETONES UR STRIP-MCNC: NEGATIVE MG/DL
LEUKOCYTE ESTERASE UR QL STRIP: NEGATIVE
LIPASE SERPL-CCNC: 376 U/L (ref 73–393)
LYMPHOCYTES # BLD AUTO: 3.4 10E9/L (ref 0.8–5.3)
LYMPHOCYTES NFR BLD AUTO: 34.3 %
MCH RBC QN AUTO: 30.3 PG (ref 26.5–33)
MCHC RBC AUTO-ENTMCNC: 34.2 G/DL (ref 31.5–36.5)
MCV RBC AUTO: 89 FL (ref 78–100)
MONOCYTES # BLD AUTO: 0.6 10E9/L (ref 0–1.3)
MONOCYTES NFR BLD AUTO: 6.5 %
NEUTROPHILS # BLD AUTO: 5.6 10E9/L (ref 1.6–8.3)
NEUTROPHILS NFR BLD AUTO: 57.4 %
NITRATE UR QL: NEGATIVE
NRBC # BLD AUTO: 0 10*3/UL
NRBC BLD AUTO-RTO: 0 /100
PH UR STRIP: 6 PH (ref 5–7)
PLATELET # BLD AUTO: 326 10E9/L (ref 150–450)
POTASSIUM SERPL-SCNC: 3.9 MMOL/L (ref 3.4–5.3)
PROT SERPL-MCNC: 7.5 G/DL (ref 6.8–8.8)
RBC # BLD AUTO: 4.32 10E12/L (ref 3.8–5.2)
SODIUM SERPL-SCNC: 140 MMOL/L (ref 133–144)
SP GR UR STRIP: 1.01 (ref 1–1.03)
URN SPEC COLLECT METH UR: NORMAL
UROBILINOGEN UR STRIP-ACNC: 0.2 EU/DL (ref 0.2–1)
WBC # BLD AUTO: 9.8 10E9/L (ref 4–11)

## 2017-05-03 PROCEDURE — 81003 URINALYSIS AUTO W/O SCOPE: CPT | Performed by: PHYSICIAN ASSISTANT

## 2017-05-03 PROCEDURE — 25000125 ZZHC RX 250: Performed by: PHYSICIAN ASSISTANT

## 2017-05-03 PROCEDURE — 25500064 ZZH RX 255 OP 636: Performed by: PHYSICIAN ASSISTANT

## 2017-05-03 PROCEDURE — 96361 HYDRATE IV INFUSION ADD-ON: CPT

## 2017-05-03 PROCEDURE — 96374 THER/PROPH/DIAG INJ IV PUSH: CPT

## 2017-05-03 PROCEDURE — 96376 TX/PRO/DX INJ SAME DRUG ADON: CPT

## 2017-05-03 PROCEDURE — 25000128 H RX IP 250 OP 636: Performed by: PHYSICIAN ASSISTANT

## 2017-05-03 PROCEDURE — 85025 COMPLETE CBC W/AUTO DIFF WBC: CPT | Performed by: EMERGENCY MEDICINE

## 2017-05-03 PROCEDURE — 99285 EMERGENCY DEPT VISIT HI MDM: CPT | Mod: 25

## 2017-05-03 PROCEDURE — 74177 CT ABD & PELVIS W/CONTRAST: CPT

## 2017-05-03 PROCEDURE — 83690 ASSAY OF LIPASE: CPT | Performed by: EMERGENCY MEDICINE

## 2017-05-03 PROCEDURE — 96375 TX/PRO/DX INJ NEW DRUG ADDON: CPT

## 2017-05-03 PROCEDURE — 80053 COMPREHEN METABOLIC PANEL: CPT | Performed by: EMERGENCY MEDICINE

## 2017-05-03 RX ORDER — NORGESTIMATE AND ETHINYL ESTRADIOL 7DAYSX3 LO
1 KIT ORAL DAILY
Status: ON HOLD | COMMUNITY
End: 2017-07-19

## 2017-05-03 RX ORDER — IOPAMIDOL 755 MG/ML
74 INJECTION, SOLUTION INTRAVASCULAR ONCE
Status: COMPLETED | OUTPATIENT
Start: 2017-05-03 | End: 2017-05-03

## 2017-05-03 RX ORDER — ONDANSETRON 2 MG/ML
4 INJECTION INTRAMUSCULAR; INTRAVENOUS EVERY 30 MIN PRN
Status: DISCONTINUED | OUTPATIENT
Start: 2017-05-03 | End: 2017-05-03 | Stop reason: HOSPADM

## 2017-05-03 RX ORDER — HYDROMORPHONE HYDROCHLORIDE 1 MG/ML
0.5 INJECTION, SOLUTION INTRAMUSCULAR; INTRAVENOUS; SUBCUTANEOUS
Status: COMPLETED | OUTPATIENT
Start: 2017-05-03 | End: 2017-05-03

## 2017-05-03 RX ADMIN — HYDROMORPHONE HYDROCHLORIDE 0.5 MG: 1 INJECTION, SOLUTION INTRAMUSCULAR; INTRAVENOUS; SUBCUTANEOUS at 16:02

## 2017-05-03 RX ADMIN — ONDANSETRON 4 MG: 2 SOLUTION INTRAMUSCULAR; INTRAVENOUS at 16:00

## 2017-05-03 RX ADMIN — ONDANSETRON 4 MG: 2 SOLUTION INTRAMUSCULAR; INTRAVENOUS at 15:22

## 2017-05-03 RX ADMIN — SODIUM CHLORIDE 1000 ML: 9 INJECTION, SOLUTION INTRAVENOUS at 15:26

## 2017-05-03 RX ADMIN — HYDROMORPHONE HYDROCHLORIDE 0.5 MG: 1 INJECTION, SOLUTION INTRAMUSCULAR; INTRAVENOUS; SUBCUTANEOUS at 15:27

## 2017-05-03 RX ADMIN — SODIUM CHLORIDE 62 ML: 9 INJECTION, SOLUTION INTRAVENOUS at 17:01

## 2017-05-03 RX ADMIN — HYDROMORPHONE HYDROCHLORIDE 0.5 MG: 1 INJECTION, SOLUTION INTRAMUSCULAR; INTRAVENOUS; SUBCUTANEOUS at 16:53

## 2017-05-03 RX ADMIN — IOPAMIDOL 74 ML: 755 INJECTION, SOLUTION INTRAVENOUS at 17:01

## 2017-05-03 ASSESSMENT — ENCOUNTER SYMPTOMS
VOMITING: 1
ABDOMINAL PAIN: 1
FLANK PAIN: 0
NAUSEA: 1
APPETITE CHANGE: 1
BACK PAIN: 1
HEMATURIA: 0
FEVER: 0
LIGHT-HEADEDNESS: 1
CHILLS: 0
DYSURIA: 0
RECTAL PAIN: 1
DIARRHEA: 1
ANAL BLEEDING: 0
BLOOD IN STOOL: 0

## 2017-05-03 NOTE — ED AVS SNAPSHOT
Emergency Department    [address] [facility] [address]    Phone:  [phone]    Fax:  [phone]                                       Julia Smith   MRN: [id number]    Department:   Emergency Department   Date of Visit:  5/3/2017           Patient Information     Date Of Birth          1985        Your diagnoses for this visit were:     Abdominal pain, left lower quadrant     Nausea, vomiting and diarrhea        You were seen by Marta Kaye PA-C.      Follow-up Information     Follow up with  Emergency Department.    Specialty:  EMERGENCY MEDICINE    Why:  If symptoms worsen    Contact information:    [address] [facility] [address] [address] [phone]        Discharge Instructions       Discharge Instructions  Abdominal Pain    Abdominal pain can be caused by many things. Your evaluation today does not show the exact cause for your pain. Your doctor today has decided that it is unlikely your pain is due to a life threatening problem, or a problem requiring surgery or hospital admission. Sometimes those problems cannot be found right away, so it is very important that you follow up as directed.  Sometimes only the changes which occur over time allow the cause of your pain to be found.    Return to the Emergency Department for a recheck in 8-12 hours if your pain continues.  If your pain gets worse, changes in location, or feels different, return to the Emergency Department right away.    ADULTS:  Return to the Emergency Department right away if:      You get an oral temperature above 102oF or as directed by your doctor.    You have blood in your stools (bright red or black, tarry stools).    You keep throwing up or can t drink liquids.    You see blood when you throw up.    You can t have a bowel movement or you can t pass gas.    Your stomach gets bloated or bigger.    Your skin or the whites of your eyes look yellow.    You faint.    You have bloody,  frequent or painful urination.    You have new symptoms or anything that worries you.    CHILDREN:  Return to the Emergency Department right away if your child has any of the above-listed symptoms or the following:      Pushes your hand away or screams/cries when his/her belly is touched.    You notice your child is very fussy or weak.    Your child is very tired and is too tired to eat or drink.    Your child is dehydrated.  Signs of dehydration can be:  o Your infant has had no wet diapers in 4-5 hours.  o Your older child has not passed urine in 6-8 hours.  o Your infant or child starts to have dry mouth and lips, or no saliva or tears.    PREGNANT WOMEN:  Return to the Emergency Department right away if you have any of the above-listed symptoms or the following:      You have bleeding, leaking fluid or passing tissue from the vagina.    You have worse pain or cramping, or pain in your shoulder or back.    You have vomiting that will not stop.    You have painful or bloody urination.    You have a temperature of 100oF or more.    Your baby is not moving as much as usual.    You faint.    You get a bad headache with or without eye problems and abdominal pain.    You have a convulsion or seizure.    You have unusual discharge from your vagina and abdominal pain.    Abdominal pain is pretty common during pregnancy.  Your pain may or may not be related to your pregnancy. You should follow-up closely with your OB doctor so they can evaluate you and your baby.  Until you follow-up with your regular doctor, do the following:       Avoid sex and do not put anything in your vagina.    Drink clear fluids.    Only take medications approved by your doctor.    MORE INFORMATION:    Appendicitis:  A possible cause of abdominal pain in any person who still has their appendix is acute appendicitis. Appendicitis is often hard to diagnose.  Testing does not always rule out early appendicitis or other causes of abdominal pain. Close  "follow-up with your doctor and re-evaluations may be needed to figure out the reason for your abdominal pain.    Follow-up:  It is very important that you make an appointment with your clinic and go to the appointment.  If you do not follow-up with your primary doctor, it may result in missing an important development which could result in permanent injury or disability and/or lasting pain.  If there is any problem keeping your appointment, call your doctor or return to the Emergency Department.    Medications:  Take your medications as directed by your doctor today.  Before using over-the-counter medications, ask your doctor and make sure to take the medications as directed.  If you have any questions about medications, ask your doctor.    Diet:  Resume your normal diet as much as possible, but do not eat fried, fatty or spicy foods while you have pain.  Do not drink alcohol or have caffeine.  Do not smoke tobacco.    Probiotics: If you have been given an antibiotic, you may want to also take a probiotic pill or eat yogurt with live cultures. Probiotics have \"good bacteria\" to help your intestines stay healthy. Studies have shown that probiotics help prevent diarrhea and other intestine problems (including C. diff infection) when you take antibiotics. You can buy these without a prescription in the pharmacy section of the store.     If you were given a prescription for medicine here today, be sure to read all of the information (including the package insert) that comes with your prescription.  This will include important information about the medicine, its side effects, and any warnings that you need to know about.  The pharmacist who fills the prescription can provide more information and answer questions you may have about the medicine.  If you have questions or concerns that the pharmacist cannot address, please call or return to the Emergency Department.       Discharge Instructions  Vomiting    You have been " seen today for vomiting. This is usually caused by a virus, but some bacteria, parasites, medicines or other medical conditions can cause similar symptoms. At this time your doctor does not find that your vomiting is a sign of anything dangerous or life-threatening. However, sometimes the signs of serious illness do not show up right away. If you have new or worse symptoms, you may need to be seen again in the emergency department or by your primary doctor. Remember that serious problems like appendicitis can start as vomiting.     Return to the Emergency Department if:    You keep throwing up and you are not able to keep liquids down.     You feel you are getting dehydrated, such as being very thirsty, not urinating at least every 8-12 hours, or feeling faint or lightheaded.     You develop a new fever, or your fever continues for more than 2 days.     You have belly pain that seems worse than cramps, is in one spot, or is getting worse over time.     You have blood in your vomit or stools.     You feel very weak.    You are not starting to improve within 24 hours of your visit here.     What can I do to help myself?    The most important thing to do is to drink clear liquids. If you have been vomiting a lot, it is best to have only small, frequent sips of liquids. Drinking too much at once may cause more vomiting. If you are vomiting often, you must replace minerals, sodium and potassium lost with your illness. Pedialyte  and sports drinks can help you replace these minerals. You can also drink clear liquids such as water, weak tea, apple juice, and 7-Up . Avoid acid liquids (orange), caffeine (coffee) or alcohol. Do not drink milk until you no longer have diarrhea.     After liquids are staying down, you may start eating mild foods. Soda crackers, toast, plain noodles, gelatin, applesauce and bananas are good first choices. Avoid foods that have acid, are spicy, fatty or have a lot of fiber (such as meats, coarse  grains, vegetables). You may start eating these foods again in about 3 days when you are better.     Sometimes treatment includes prescription medicine to prevent nausea and vomiting. If your doctor prescribes these for you, take them as directed.     Don t take ibuprofen, or other nonsteroidal anti-inflammatory medicines without checking with your healthcare provider.   If you were given a prescription for medicine here today, be sure to read all of the information (including the package insert) that comes with your prescription.  This will include important information about the medicine, its side effects, and any warnings that you need to know about.  The pharmacist who fills the prescription can provide more information and answer questions you may have about the medicine.  If you have questions or concerns that the pharmacist cannot address, please call or return to the Emergency Department.           Remember that you can always come back to the Emergency Department if you are not able to see your regular doctor in the amount of time listed above, if you get any new symptoms, or if there is anything that worries you.              24 Hour Appointment Hotline       To make an appointment at any [facility], call [phone] ([phone]). If you don't have a family doctor or clinic, we will help you find one. [facility] clinics are conveniently located to serve the needs of you and your family.             Review of your medicines      Our records show that you are taking the medicines listed below. If these are incorrect, please call your family doctor or clinic.        Dose / Directions Last dose taken    ADDERALL PO        Refills:  0        benzonatate 100 MG capsule   Commonly known as:  TESSALON   Dose:  100 mg   Quantity:  30 capsule        Take 1 capsule (100 mg) by mouth 3 times daily as needed   Refills:  0        fluconazole 150 MG tablet   Commonly known as:  DIFLUCAN   Dose:  150 mg    Quantity:  4 tablet        Take 1 tablet (150 mg) by mouth every 3 days   Refills:  0        lamoTRIgine 150 MG tablet   Commonly known as:  LaMICtal        Refills:  1        LYRICA 50 MG capsule   Generic drug:  pregabalin        TK 1 C PO TID.   Refills:  0        ORTHO TRI-CYCLEN LO 0.18/0.215/0.25 MG-25 MCG per tablet   Dose:  1 tablet   Generic drug:  norgestim-eth estrad triphasic        Take 1 tablet by mouth daily   Refills:  0        penicillin G benzathine 9992773 UNIT/2ML injection   Commonly known as:  BICILLIN L-A   Quantity:  2 mL        1.2 million units injected IM x 1   Refills:  0        sertraline 100 MG tablet   Commonly known as:  ZOLOFT   Dose:  100 mg   Quantity:  30 tablet        Take 1 tablet (100 mg) by mouth At Bedtime   Refills:  0        VITAMIN D (CHOLECALCIFEROL) PO   Dose:  4000 Units        Take 4,000 Units by mouth daily   Refills:  0                Procedures and tests performed during your visit     *UA reflex to Microscopic    CBC with platelets differential    CT Abdomen Pelvis w Contrast    Comprehensive metabolic panel    Lipase      Orders Needing Specimen Collection     None      Pending Results     Date and Time Order Name Status Description    5/3/2017 1515 CT Abdomen Pelvis w Contrast Preliminary             Pending Culture Results     No orders found from 5/1/2017 to 5/4/2017.            Pending Results Instructions     If you had any lab results that were not finalized at the time of your Discharge, you can call the ED Lab Result RN at [phone]. You will be contacted by this team for any positive Lab results or changes in treatment. The nurses are available 7 days a week from 10A to 6:30P.  You can leave a message 24 hours per day and they will return your call.        Test Results From Your Hospital Stay        5/3/2017  2:59 PM      Component Results     Component Value Ref Range & Units Status    WBC 9.8 4.0 - 11.0 10e9/L Final    RBC Count 4.32 3.8 - 5.2  10e12/L Final    Hemoglobin 13.1 11.7 - 15.7 g/dL Final    Hematocrit 38.3 35.0 - 47.0 % Final    MCV 89 78 - 100 fl Final    MCH 30.3 26.5 - 33.0 pg Final    MCHC 34.2 31.5 - 36.5 g/dL Final    RDW 13.6 10.0 - 15.0 % Final    Platelet Count 326 150 - 450 10e9/L Final    Diff Method Automated Method  Final    % Neutrophils 57.4 % Final    % Lymphocytes 34.3 % Final    % Monocytes 6.5 % Final    % Eosinophils 1.2 % Final    % Basophils 0.3 % Final    % Immature Granulocytes 0.3 % Final    Nucleated RBCs 0 0 /100 Final    Absolute Neutrophil 5.6 1.6 - 8.3 10e9/L Final    Absolute Lymphocytes 3.4 0.8 - 5.3 10e9/L Final    Absolute Monocytes 0.6 0.0 - 1.3 10e9/L Final    Absolute Eosinophils 0.1 0.0 - 0.7 10e9/L Final    Absolute Basophils 0.0 0.0 - 0.2 10e9/L Final    Abs Immature Granulocytes 0.0 0 - 0.4 10e9/L Final    Absolute Nucleated RBC 0.0  Final         5/3/2017  3:26 PM      Component Results     Component Value Ref Range & Units Status    Sodium 140 133 - 144 mmol/L Final    Potassium 3.9 3.4 - 5.3 mmol/L Final    Chloride 106 94 - 109 mmol/L Final    Carbon Dioxide 26 20 - 32 mmol/L Final    Anion Gap 8 3 - 14 mmol/L Final    Glucose 114 (H) 70 - 99 mg/dL Final    Urea Nitrogen 9 7 - 30 mg/dL Final    Creatinine 0.70 0.52 - 1.04 mg/dL Final    GFR Estimate >90  Non  GFR Calc   >60 mL/min/1.7m2 Final    GFR Estimate If Black >90   GFR Calc   >60 mL/min/1.7m2 Final    Calcium 8.9 8.5 - 10.1 mg/dL Final    Bilirubin Total 0.2 0.2 - 1.3 mg/dL Final    Albumin 3.7 3.4 - 5.0 g/dL Final    Protein Total 7.5 6.8 - 8.8 g/dL Final    Alkaline Phosphatase 73 40 - 150 U/L Final    ALT 18 0 - 50 U/L Final    AST 7 0 - 45 U/L Final         5/3/2017  3:25 PM      Component Results     Component Value Ref Range & Units Status    Lipase 376 73 - 393 U/L Final         5/3/2017  5:24 PM      Narrative     CT ABDOMEN AND PELVIS WITH CONTRAST 5/3/2017 5:18 PM    HISTORY: Abdominal pain and  vomiting. History of endometriosis and  hysterectomy.    TECHNIQUE: Helical axial scans from dome of liver through pubic  symphysis with 74 mL Isovue-370 IV contrast. Radiation dose for this  scan was reduced using automated exposure control, adjustment of the  mA and/or kV according to patient size, or iterative reconstruction  technique.    COMPARISON: 9/8/2016    FINDINGS: The liver, spleen, pancreas, bilateral adrenal glands and  kidneys bilaterally are unremarkable. The bowel and mesentery in the  upper abdomen appear normal.    Scans through the pelvis show prior hysterectomy. No acute  abnormalities. No free fluid. Surgical clips related to the tip of the  cecum, likely from prior appendectomy.        Impression     IMPRESSION:  1. No acute appearing abnormality in the abdomen or pelvis.  2. Prior hysterectomy and appendectomy again noted.         5/3/2017  5:48 PM      Component Results     Component Value Ref Range & Units Status    Color Urine Yellow  Final    Appearance Urine Clear  Final    Glucose Urine Negative NEG mg/dL Final    Bilirubin Urine Negative NEG Final    Ketones Urine Negative NEG mg/dL Final    Specific Gravity Urine 1.010 1.003 - 1.035 Final    Blood Urine Negative NEG Final    pH Urine 6.0 5.0 - 7.0 pH Final    Protein Albumin Urine Negative NEG mg/dL Final    Urobilinogen Urine 0.2 0.2 - 1.0 EU/dL Final    Nitrite Urine Negative NEG Final    Leukocyte Esterase Urine Negative NEG Final    Source Midstream Urine  Final                Clinical Quality Measure: Blood Pressure Screening     Your blood pressure was checked while you were in the emergency department today. The last reading we obtained was  BP: 122/74 . Please read the guidelines below about what these numbers mean and what you should do about them.  If your systolic blood pressure (the top number) is less than 120 and your diastolic blood pressure (the bottom number) is less than 80, then your blood pressure is normal. There  is nothing more that you need to do about it.  If your systolic blood pressure (the top number) is 120-139 or your diastolic blood pressure (the bottom number) is 80-89, your blood pressure may be higher than it should be. You should have your blood pressure rechecked within a year by a primary care provider.  If your systolic blood pressure (the top number) is 140 or greater or your diastolic blood pressure (the bottom number) is 90 or greater, you may have high blood pressure. High blood pressure is treatable, but if left untreated over time it can put you at risk for heart attack, stroke, or kidney failure. You should have your blood pressure rechecked by a primary care provider within the next 4 weeks.  If your provider in the emergency department today gave you specific instructions to follow-up with your doctor or provider even sooner than that, you should follow that instruction and not wait for up to 4 weeks for your follow-up visit.        Thank you for choosing [facility]       Thank you for choosing [facility] for your care. Our goal is always to provide you with excellent care. Hearing back from our patients is one way we can continue to improve our services. Please take a few minutes to complete the written survey that you may receive in the mail after you visit with us. Thank you!        [facility] Information     [employer] gives you secure access to your electronic health record. If you see a primary care provider, you can also send messages to your care team and make appointments. If you have questions, please call your primary care clinic.  If you do not have a primary care provider, please call [phone] and they will assist you.        Care EveryWhere ID     This is your Care EveryWhere ID. This could be used by other organizations to access your [facility] medical records  [id number]        After Visit Summary       This is your record. Keep this with you and show to your community pharmacist(s) and  doctor(s) at your next visit.

## 2017-05-03 NOTE — ED AVS SNAPSHOT
Emergency Department    [address] [facility] [address]    Phone:  [phone]    Fax:  [phone]                                       Julia Smith   MRN: [id number]    Department:   Emergency Department   Date of Visit:  5/3/2017           After Visit Summary Signature Page     I have received my discharge instructions, and my questions have been answered. I have discussed any challenges I see with this plan with the nurse or doctor.    ..........................................................................................................................................  Patient/Patient Representative Signature      ..........................................................................................................................................  Patient Representative Print Name and Relationship to Patient    ..................................................               ................................................  Date                                            Time    ..........................................................................................................................................  Reviewed by Signature/Title    ...................................................              ..............................................  Date                                                            Time

## 2017-05-03 NOTE — ED PROVIDER NOTES
"  History     Chief Complaint:  Abdominal pain    HPI   Julia Smith is a 32 year old female who presents with abdominal pain, nausea, vomiting, and diarrhea. The patient reports that she has a history of abdominal pain that was found to be due to \"adhesions wrapping around her bowel,\" per her report. She underwent surgery in January for these and included a hysterectomy and left oophorectomy. She notes that she has had recurrent abdominal pain since that time intermittently. She has had some difficulty keeping certain foods down and has been supplementing her diet with Ensure over the past month. She has also been using Zofran and cannabis to supplement her symptoms of nausea and anorexia with some relief. However, over the past several days she has had significant worsening of her symptoms and has been unable to keep any solid foods down; she has been able to tolerate fluids. Every time she would eat, approximately an hour later she would vomit or have a bout of diarrhea. The pain has been diffuse across her abdomen but more localized in her left lower quadrant and felt typical to her previous incident of abdominal pain. Given the symptoms, she presents here at the recommendation of her physician for evaluation. She notes ongoing abdominal pain, nausea, and now rectal pain from her recurrent diarrhea. She otherwise has had some radiation into the back but otherwise she denies any fevers, chills, bloody stools, dysuria, hematuria, or flank pain. She has some lightheadedness but denies any dizziness.    Allergies:  Decadron [Dexamethasone]  Depakote [Divalproex Sodium]  Ibuprofen  Tramadol  Valproic Acid      Medications:    Ethinyl estradiol  Adderall  Lamictal  Zoloft  Diflucan    Past Medical History:    Seroma post operative  GERD  Hyperlipidemia  Depression   Anemia  Chronic pain disorder  Bipolar 2 disorder  Endometriosis  Anxiety  PCOS  Fibromyalgia   TMJ  Panic attacks    Past Surgical History:  " "  Cystoscopy  Ablation/excision of endometriosis  LEONARDO  Left oophorectomy   Davinci pelvic procedure  D&C x2  Appendectomy  Laparoscopy diagnostic     Family History:    Depression    Social History:  Smoking status: Current smoker  Alcohol use: Rarely    Marital Status:       Review of Systems   Constitutional: Positive for appetite change. Negative for chills and fever.   Respiratory: Negative for shortness of breath.    Cardiovascular: Negative for chest pain.   Gastrointestinal: Positive for abdominal pain, diarrhea, nausea, rectal pain and vomiting. Negative for anal bleeding and blood in stool.   Genitourinary: Negative for dysuria, flank pain and hematuria.   Musculoskeletal: Positive for back pain.   Neurological: Positive for light-headedness.   All other systems reviewed and are negative.      Physical Exam   Patient Vitals for the past 24 hrs:   BP Temp Heart Rate Resp SpO2 Height Weight   05/03/17 1620 - - - 16 96 % - -   05/03/17 1434 122/74 98.2  F (36.8  C) 97 18 96 % 1.626 m (5' 4\") 66.7 kg (147 lb)      Physical Exam  General: Resting on the gurney, tearful.    Head:  The scalp, head and face appear normal.    ENT:  Pupils are equal, round and reactive to light.     Oropharynx is moist.  No uvular deviation. Posterior oropharynx is without erythema, exudate or tonsillar swelling.   Neck:  Supple, no rigidity noted. Normal ROM.     Trachea midline. No mass detected.    Lymph: No anterior or posterior cervical lymphadenopathy noted.   Resp:  Non-labored breathing. No tachypnea.     Lung fields clear to auscultation without wheezes or rales.   CV:  Regular rate and rhythm. Normal S1 and S2, no S3 or S4.     No pathological murmur detected.     Radial, DP and PT pulses intact and symmetric.   GI:  Abdomen is soft and non-distended.     Diffuse abdominal tenderness, most significantly to the LLQ, no rebound or guarding.     No masses or organomegaly.    MS:  Normal muscular tone.     Normal and " symmetric motor strength of all four extremities.     No asymmetrical lower leg swelling or calf tenderness.   Neuro:  Awake and alert. Speech is clear.   Skin:  No rash or pallor.  Psych: Normal affect. Appropriate interactions.       Emergency Department Course     Imaging:  Radiographic findings were communicated with the patient who voiced understanding of the findings.    CT-scan Abdomen/Pelvis w/ contrast:  1. No acute appearing abnormality in the abdomen or pelvis.  2. Prior hysterectomy and appendectomy again noted.  Preliminary result per radiology.      Laboratory:  CBC: WNL (WBC 9.8, HGB 13.1, )    CMP: Glucose 114 (H), o/w WNL (Creatinine 0.70)   Lipase: 376  UA: WNL    Interventions:  1522 - Zofran 4 mg IV  1526 - NS 1L IV bolus  1527 - Dilaudid 0.5 mg IV  1600 - Zofran 4 mg IV  1602 - Dilaudid 0.5 mg IV  1653 - Dilaudid 0.5 mg IV    Emergency Department Course:  Past medical records, nursing notes, and vitals reviewed.  1507: I performed an exam of the patient and obtained history, as documented above.    IV inserted and blood drawn.   The patient was sent for a CT-scan while in the emergency department, findings above.     1558: I rechecked the patient. Explained findings to patient. She is feeling improved.    1734: I rechecked the patient and explained findings.    Findings and plan explained to the Patient. Patient discharged home with instructions regarding supportive care, medications, and reasons to return. The importance of close follow-up was reviewed.     Impression & Plan      Medical Decision Making:  Julia Smith is a 32 year old female with a history of chronic abdominal pain, PCOS, endometriosis, and bipolar disorder who presents to the emergency department with worsening of her chronic abdominal pain with associated nausea, vomiting, diarrhea. The differential diagnosis was broad and included but not limited to biliary or pancreatic process, UTI, diverticulitis or  obstruction, ischemic bowel, dissection, among others. She has had hysterectomy and oophorectomy. Urine is negative for signs of infection or hematuria. Presentation is not consistent with renal colic. Her lab work is otherwise reassuring with no leukocytosis, anemia, or concerning electrolyte abnormalities or renal hepatic dysfunction. With no elevation of her bilirubin, LFTs, or lipase I doubt biliary or pancreatic process. Due to the severity of her pain and ongoing symptoms a CT of her abdomen was obtained and was normal without signs of acute intraabdominal process. She felt significantly improved after the above interventions. She does follow these chronic problems with OB as well as GI. I recommend she keep these appointments and continue to follow up. We discussed that she may have developed a gastroenteritis that is exacerbating her pain, but the exact etiology is not clear. However she is tolerating PO here and feels significantly improved after the above interventions. I do feel she is safe for outpatient management.  She will follow up as above and return to the emergency department with worsening or changing abdominal pain, vomiting, fevers, or if she becomes worse in anyway. I discussed the results, plan and any additional questions with the patient. She verbalized understanding and agreement with the plan.       Diagnosis:    ICD-10-CM    1. Abdominal pain, left lower quadrant R10.32    2. Nausea, vomiting and diarrhea R11.2     R19.7        Disposition:  Discharged.    Matias Cuevas  5/3/2017    EMERGENCY DEPARTMENT  Matias LEIGH, am serving as a scribe at 3:07 PM on 5/3/2017 to document services personally performed by Marta Kaye PA-C based on my observations and the provider's statements to me.       Marta Kaye PA-C  05/04/17 0012

## 2017-05-03 NOTE — DISCHARGE INSTRUCTIONS
Discharge Instructions  Abdominal Pain    Abdominal pain can be caused by many things. Your evaluation today does not show the exact cause for your pain. Your doctor today has decided that it is unlikely your pain is due to a life threatening problem, or a problem requiring surgery or hospital admission. Sometimes those problems cannot be found right away, so it is very important that you follow up as directed.  Sometimes only the changes which occur over time allow the cause of your pain to be found.    Return to the Emergency Department for a recheck in 8-12 hours if your pain continues.  If your pain gets worse, changes in location, or feels different, return to the Emergency Department right away.    ADULTS:  Return to the Emergency Department right away if:      You get an oral temperature above 102oF or as directed by your doctor.    You have blood in your stools (bright red or black, tarry stools).    You keep throwing up or can t drink liquids.    You see blood when you throw up.    You can t have a bowel movement or you can t pass gas.    Your stomach gets bloated or bigger.    Your skin or the whites of your eyes look yellow.    You faint.    You have bloody, frequent or painful urination.    You have new symptoms or anything that worries you.    CHILDREN:  Return to the Emergency Department right away if your child has any of the above-listed symptoms or the following:      Pushes your hand away or screams/cries when his/her belly is touched.    You notice your child is very fussy or weak.    Your child is very tired and is too tired to eat or drink.    Your child is dehydrated.  Signs of dehydration can be:  o Your infant has had no wet diapers in 4-5 hours.  o Your older child has not passed urine in 6-8 hours.  o Your infant or child starts to have dry mouth and lips, or no saliva or tears.    PREGNANT WOMEN:  Return to the Emergency Department right away if you have any of the above-listed symptoms or  the following:      You have bleeding, leaking fluid or passing tissue from the vagina.    You have worse pain or cramping, or pain in your shoulder or back.    You have vomiting that will not stop.    You have painful or bloody urination.    You have a temperature of 100oF or more.    Your baby is not moving as much as usual.    You faint.    You get a bad headache with or without eye problems and abdominal pain.    You have a convulsion or seizure.    You have unusual discharge from your vagina and abdominal pain.    Abdominal pain is pretty common during pregnancy.  Your pain may or may not be related to your pregnancy. You should follow-up closely with your OB doctor so they can evaluate you and your baby.  Until you follow-up with your regular doctor, do the following:       Avoid sex and do not put anything in your vagina.    Drink clear fluids.    Only take medications approved by your doctor.    MORE INFORMATION:    Appendicitis:  A possible cause of abdominal pain in any person who still has their appendix is acute appendicitis. Appendicitis is often hard to diagnose.  Testing does not always rule out early appendicitis or other causes of abdominal pain. Close follow-up with your doctor and re-evaluations may be needed to figure out the reason for your abdominal pain.    Follow-up:  It is very important that you make an appointment with your clinic and go to the appointment.  If you do not follow-up with your primary doctor, it may result in missing an important development which could result in permanent injury or disability and/or lasting pain.  If there is any problem keeping your appointment, call your doctor or return to the Emergency Department.    Medications:  Take your medications as directed by your doctor today.  Before using over-the-counter medications, ask your doctor and make sure to take the medications as directed.  If you have any questions about medications, ask your doctor.    Diet:   "Resume your normal diet as much as possible, but do not eat fried, fatty or spicy foods while you have pain.  Do not drink alcohol or have caffeine.  Do not smoke tobacco.    Probiotics: If you have been given an antibiotic, you may want to also take a probiotic pill or eat yogurt with live cultures. Probiotics have \"good bacteria\" to help your intestines stay healthy. Studies have shown that probiotics help prevent diarrhea and other intestine problems (including C. diff infection) when you take antibiotics. You can buy these without a prescription in the pharmacy section of the store.     If you were given a prescription for medicine here today, be sure to read all of the information (including the package insert) that comes with your prescription.  This will include important information about the medicine, its side effects, and any warnings that you need to know about.  The pharmacist who fills the prescription can provide more information and answer questions you may have about the medicine.  If you have questions or concerns that the pharmacist cannot address, please call or return to the Emergency Department.       Discharge Instructions  Vomiting    You have been seen today for vomiting. This is usually caused by a virus, but some bacteria, parasites, medicines or other medical conditions can cause similar symptoms. At this time your doctor does not find that your vomiting is a sign of anything dangerous or life-threatening. However, sometimes the signs of serious illness do not show up right away. If you have new or worse symptoms, you may need to be seen again in the emergency department or by your primary doctor. Remember that serious problems like appendicitis can start as vomiting.     Return to the Emergency Department if:    You keep throwing up and you are not able to keep liquids down.     You feel you are getting dehydrated, such as being very thirsty, not urinating at least every 8-12 hours, or " feeling faint or lightheaded.     You develop a new fever, or your fever continues for more than 2 days.     You have belly pain that seems worse than cramps, is in one spot, or is getting worse over time.     You have blood in your vomit or stools.     You feel very weak.    You are not starting to improve within 24 hours of your visit here.     What can I do to help myself?    The most important thing to do is to drink clear liquids. If you have been vomiting a lot, it is best to have only small, frequent sips of liquids. Drinking too much at once may cause more vomiting. If you are vomiting often, you must replace minerals, sodium and potassium lost with your illness. Pedialyte  and sports drinks can help you replace these minerals. You can also drink clear liquids such as water, weak tea, apple juice, and 7-Up . Avoid acid liquids (orange), caffeine (coffee) or alcohol. Do not drink milk until you no longer have diarrhea.     After liquids are staying down, you may start eating mild foods. Soda crackers, toast, plain noodles, gelatin, applesauce and bananas are good first choices. Avoid foods that have acid, are spicy, fatty or have a lot of fiber (such as meats, coarse grains, vegetables). You may start eating these foods again in about 3 days when you are better.     Sometimes treatment includes prescription medicine to prevent nausea and vomiting. If your doctor prescribes these for you, take them as directed.     Don t take ibuprofen, or other nonsteroidal anti-inflammatory medicines without checking with your healthcare provider.   If you were given a prescription for medicine here today, be sure to read all of the information (including the package insert) that comes with your prescription.  This will include important information about the medicine, its side effects, and any warnings that you need to know about.  The pharmacist who fills the prescription can provide more information and answer questions  you may have about the medicine.  If you have questions or concerns that the pharmacist cannot address, please call or return to the Emergency Department.           Remember that you can always come back to the Emergency Department if you are not able to see your regular doctor in the amount of time listed above, if you get any new symptoms, or if there is anything that worries you.

## 2017-05-04 ASSESSMENT — ENCOUNTER SYMPTOMS: SHORTNESS OF BREATH: 0

## 2017-05-15 ENCOUNTER — APPOINTMENT (OUTPATIENT)
Dept: GENERAL RADIOLOGY | Facility: CLINIC | Age: 32
End: 2017-05-15
Attending: EMERGENCY MEDICINE
Payer: COMMERCIAL

## 2017-05-15 ENCOUNTER — HOSPITAL ENCOUNTER (EMERGENCY)
Facility: CLINIC | Age: 32
Discharge: HOME | End: 2017-05-15
Attending: EMERGENCY MEDICINE | Admitting: EMERGENCY MEDICINE
Payer: COMMERCIAL

## 2017-05-15 ENCOUNTER — APPOINTMENT (OUTPATIENT)
Dept: ULTRASOUND IMAGING | Facility: CLINIC | Age: 32
End: 2017-05-15
Attending: EMERGENCY MEDICINE
Payer: COMMERCIAL

## 2017-05-15 VITALS
DIASTOLIC BLOOD PRESSURE: 78 MMHG | HEART RATE: 90 BPM | TEMPERATURE: 99.1 F | WEIGHT: 145 LBS | RESPIRATION RATE: 16 BRPM | SYSTOLIC BLOOD PRESSURE: 114 MMHG | HEIGHT: 64 IN | OXYGEN SATURATION: 96 % | BODY MASS INDEX: 24.75 KG/M2

## 2017-05-15 DIAGNOSIS — R10.2 PELVIC PAIN IN FEMALE: ICD-10-CM

## 2017-05-15 DIAGNOSIS — N83.00 OVARIAN FOLLICULAR CYST: ICD-10-CM

## 2017-05-15 LAB
ALBUMIN SERPL-MCNC: 3.8 G/DL (ref 3.4–5)
ALBUMIN UR-MCNC: NEGATIVE MG/DL
ALP SERPL-CCNC: 77 U/L (ref 40–150)
ALT SERPL W P-5'-P-CCNC: 19 U/L (ref 0–50)
ANION GAP SERPL CALCULATED.3IONS-SCNC: 8 MMOL/L (ref 3–14)
APPEARANCE UR: CLEAR
AST SERPL W P-5'-P-CCNC: 13 U/L (ref 0–45)
BASOPHILS # BLD AUTO: 0 10E9/L (ref 0–0.2)
BASOPHILS NFR BLD AUTO: 0.3 %
BILIRUB SERPL-MCNC: 0.2 MG/DL (ref 0.2–1.3)
BILIRUB UR QL STRIP: NEGATIVE
BUN SERPL-MCNC: 8 MG/DL (ref 7–30)
CALCIUM SERPL-MCNC: 9.1 MG/DL (ref 8.5–10.1)
CHLORIDE SERPL-SCNC: 107 MMOL/L (ref 94–109)
CO2 SERPL-SCNC: 25 MMOL/L (ref 20–32)
COLOR UR AUTO: YELLOW
CREAT SERPL-MCNC: 0.63 MG/DL (ref 0.52–1.04)
DIFFERENTIAL METHOD BLD: NORMAL
EOSINOPHIL # BLD AUTO: 0.1 10E9/L (ref 0–0.7)
EOSINOPHIL NFR BLD AUTO: 1.1 %
ERYTHROCYTE [DISTWIDTH] IN BLOOD BY AUTOMATED COUNT: 13.6 % (ref 10–15)
GFR SERPL CREATININE-BSD FRML MDRD: NORMAL ML/MIN/1.7M2
GLUCOSE SERPL-MCNC: 83 MG/DL (ref 70–99)
GLUCOSE UR STRIP-MCNC: NEGATIVE MG/DL
HCT VFR BLD AUTO: 39.8 % (ref 35–47)
HGB BLD-MCNC: 13.7 G/DL (ref 11.7–15.7)
HGB UR QL STRIP: NEGATIVE
IMM GRANULOCYTES # BLD: 0 10E9/L (ref 0–0.4)
IMM GRANULOCYTES NFR BLD: 0.2 %
KETONES UR STRIP-MCNC: NEGATIVE MG/DL
LEUKOCYTE ESTERASE UR QL STRIP: NEGATIVE
LIPASE SERPL-CCNC: 156 U/L (ref 73–393)
LYMPHOCYTES # BLD AUTO: 3.1 10E9/L (ref 0.8–5.3)
LYMPHOCYTES NFR BLD AUTO: 35.8 %
MCH RBC QN AUTO: 30.6 PG (ref 26.5–33)
MCHC RBC AUTO-ENTMCNC: 34.4 G/DL (ref 31.5–36.5)
MCV RBC AUTO: 89 FL (ref 78–100)
MONOCYTES # BLD AUTO: 0.5 10E9/L (ref 0–1.3)
MONOCYTES NFR BLD AUTO: 6 %
NEUTROPHILS # BLD AUTO: 4.9 10E9/L (ref 1.6–8.3)
NEUTROPHILS NFR BLD AUTO: 56.6 %
NITRATE UR QL: NEGATIVE
NRBC # BLD AUTO: 0 10*3/UL
NRBC BLD AUTO-RTO: 0 /100
PH UR STRIP: 6.5 PH (ref 5–7)
PLATELET # BLD AUTO: 311 10E9/L (ref 150–450)
POTASSIUM SERPL-SCNC: 4.1 MMOL/L (ref 3.4–5.3)
PROT SERPL-MCNC: 7.7 G/DL (ref 6.8–8.8)
RBC # BLD AUTO: 4.48 10E12/L (ref 3.8–5.2)
SODIUM SERPL-SCNC: 140 MMOL/L (ref 133–144)
SP GR UR STRIP: 1.02 (ref 1–1.03)
URN SPEC COLLECT METH UR: NORMAL
UROBILINOGEN UR STRIP-ACNC: 0.2 EU/DL (ref 0.2–1)
WBC # BLD AUTO: 8.7 10E9/L (ref 4–11)

## 2017-05-15 PROCEDURE — 96374 THER/PROPH/DIAG INJ IV PUSH: CPT

## 2017-05-15 PROCEDURE — 83690 ASSAY OF LIPASE: CPT | Performed by: EMERGENCY MEDICINE

## 2017-05-15 PROCEDURE — 96375 TX/PRO/DX INJ NEW DRUG ADDON: CPT

## 2017-05-15 PROCEDURE — 25000128 H RX IP 250 OP 636: Performed by: EMERGENCY MEDICINE

## 2017-05-15 PROCEDURE — 76830 TRANSVAGINAL US NON-OB: CPT

## 2017-05-15 PROCEDURE — 85025 COMPLETE CBC W/AUTO DIFF WBC: CPT | Performed by: EMERGENCY MEDICINE

## 2017-05-15 PROCEDURE — 96361 HYDRATE IV INFUSION ADD-ON: CPT

## 2017-05-15 PROCEDURE — 99285 EMERGENCY DEPT VISIT HI MDM: CPT | Mod: 25

## 2017-05-15 PROCEDURE — 81003 URINALYSIS AUTO W/O SCOPE: CPT | Performed by: EMERGENCY MEDICINE

## 2017-05-15 PROCEDURE — 96376 TX/PRO/DX INJ SAME DRUG ADON: CPT

## 2017-05-15 PROCEDURE — 74020 XR ABDOMEN 2 VW: CPT

## 2017-05-15 PROCEDURE — 80053 COMPREHEN METABOLIC PANEL: CPT | Performed by: EMERGENCY MEDICINE

## 2017-05-15 RX ORDER — OXYCODONE AND ACETAMINOPHEN 5; 325 MG/1; MG/1
1-2 TABLET ORAL EVERY 4 HOURS PRN
Qty: 20 TABLET | Refills: 0 | Status: SHIPPED | OUTPATIENT
Start: 2017-05-15 | End: 2017-06-28

## 2017-05-15 RX ORDER — ONDANSETRON 2 MG/ML
4 INJECTION INTRAMUSCULAR; INTRAVENOUS ONCE
Status: COMPLETED | OUTPATIENT
Start: 2017-05-15 | End: 2017-05-15

## 2017-05-15 RX ORDER — KETOROLAC TROMETHAMINE 30 MG/ML
30 INJECTION, SOLUTION INTRAMUSCULAR; INTRAVENOUS ONCE
Status: COMPLETED | OUTPATIENT
Start: 2017-05-15 | End: 2017-05-15

## 2017-05-15 RX ORDER — SODIUM CHLORIDE 9 MG/ML
1000 INJECTION, SOLUTION INTRAVENOUS CONTINUOUS
Status: DISCONTINUED | OUTPATIENT
Start: 2017-05-15 | End: 2017-05-15 | Stop reason: HOSPADM

## 2017-05-15 RX ADMIN — ONDANSETRON 4 MG: 2 SOLUTION INTRAMUSCULAR; INTRAVENOUS at 15:35

## 2017-05-15 RX ADMIN — ONDANSETRON 4 MG: 2 SOLUTION INTRAMUSCULAR; INTRAVENOUS at 17:44

## 2017-05-15 RX ADMIN — KETOROLAC TROMETHAMINE 30 MG: 30 INJECTION, SOLUTION INTRAMUSCULAR at 17:41

## 2017-05-15 RX ADMIN — SODIUM CHLORIDE 1000 ML: 9 INJECTION, SOLUTION INTRAVENOUS at 15:29

## 2017-05-15 RX ADMIN — HYDROMORPHONE HYDROCHLORIDE 1 MG: 1 INJECTION, SOLUTION INTRAMUSCULAR; INTRAVENOUS; SUBCUTANEOUS at 16:07

## 2017-05-15 ASSESSMENT — ENCOUNTER SYMPTOMS
VOMITING: 1
NAUSEA: 1
ABDOMINAL PAIN: 1
FEVER: 0
DIARRHEA: 0
CHILLS: 0

## 2017-05-15 NOTE — ED AVS SNAPSHOT
Emergency Department    [address] [facility] [address]    Phone:  [phone]    Fax:  [phone]                                       Julia Smith   MRN: [id number]    Department:   Emergency Department   Date of Visit:  5/15/2017           Patient Information     Date Of Birth          1985        Your diagnoses for this visit were:     Pelvic pain in female     Ovarian follicular cyst        You were seen by Trierweiler, Chad A, MD.      Follow-up Information     Schedule an appointment as soon as possible for a visit with Your Gynecologist.        Follow up with  Emergency Department.    Specialty:  EMERGENCY MEDICINE    Why:  If symptoms worsen    Contact information:    [address] [facility] [address] [phone]        Discharge Instructions       Discharge Instructions  Abdominal Pain    Abdominal pain can be caused by many things. Your evaluation today does not show the exact cause for your pain. Your doctor today has decided that it is unlikely your pain is due to a life threatening problem, or a problem requiring surgery or hospital admission. Sometimes those problems cannot be found right away, so it is very important that you follow up as directed.  Sometimes only the changes which occur over time allow the cause of your pain to be found.    Return to the Emergency Department for a recheck in 8-12 hours if your pain continues.  If your pain gets worse, changes in location, or feels different, return to the Emergency Department right away.    ADULTS:  Return to the Emergency Department right away if:      You get an oral temperature above 102oF or as directed by your doctor.    You have blood in your stools (bright red or black, tarry stools).    You keep throwing up or can t drink liquids.    You see blood when you throw up.    You can t have a bowel movement or you can t pass gas.    Your stomach gets bloated or bigger.    Your skin or the  whites of your eyes look yellow.    You faint.    You have bloody, frequent or painful urination.    You have new symptoms or anything that worries you.    CHILDREN:  Return to the Emergency Department right away if your child has any of the above-listed symptoms or the following:      Pushes your hand away or screams/cries when his/her belly is touched.    You notice your child is very fussy or weak.    Your child is very tired and is too tired to eat or drink.    Your child is dehydrated.  Signs of dehydration can be:  o Your infant has had no wet diapers in 4-5 hours.  o Your older child has not passed urine in 6-8 hours.  o Your infant or child starts to have dry mouth and lips, or no saliva or tears.    PREGNANT WOMEN:  Return to the Emergency Department right away if you have any of the above-listed symptoms or the following:      You have bleeding, leaking fluid or passing tissue from the vagina.    You have worse pain or cramping, or pain in your shoulder or back.    You have vomiting that will not stop.    You have painful or bloody urination.    You have a temperature of 100oF or more.    Your baby is not moving as much as usual.    You faint.    You get a bad headache with or without eye problems and abdominal pain.    You have a convulsion or seizure.    You have unusual discharge from your vagina and abdominal pain.    Abdominal pain is pretty common during pregnancy.  Your pain may or may not be related to your pregnancy. You should follow-up closely with your OB doctor so they can evaluate you and your baby.  Until you follow-up with your regular doctor, do the following:       Avoid sex and do not put anything in your vagina.    Drink clear fluids.    Only take medications approved by your doctor.    MORE INFORMATION:    Appendicitis:  A possible cause of abdominal pain in any person who still has their appendix is acute appendicitis. Appendicitis is often hard to diagnose.  Testing does not always  "rule out early appendicitis or other causes of abdominal pain. Close follow-up with your doctor and re-evaluations may be needed to figure out the reason for your abdominal pain.    Follow-up:  It is very important that you make an appointment with your clinic and go to the appointment.  If you do not follow-up with your primary doctor, it may result in missing an important development which could result in permanent injury or disability and/or lasting pain.  If there is any problem keeping your appointment, call your doctor or return to the Emergency Department.    Medications:  Take your medications as directed by your doctor today.  Before using over-the-counter medications, ask your doctor and make sure to take the medications as directed.  If you have any questions about medications, ask your doctor.    Diet:  Resume your normal diet as much as possible, but do not eat fried, fatty or spicy foods while you have pain.  Do not drink alcohol or have caffeine.  Do not smoke tobacco.    Probiotics: If you have been given an antibiotic, you may want to also take a probiotic pill or eat yogurt with live cultures. Probiotics have \"good bacteria\" to help your intestines stay healthy. Studies have shown that probiotics help prevent diarrhea and other intestine problems (including C. diff infection) when you take antibiotics. You can buy these without a prescription in the pharmacy section of the store.     If you were given a prescription for medicine here today, be sure to read all of the information (including the package insert) that comes with your prescription.  This will include important information about the medicine, its side effects, and any warnings that you need to know about.  The pharmacist who fills the prescription can provide more information and answer questions you may have about the medicine.  If you have questions or concerns that the pharmacist cannot address, please call or return to the Emergency " Department.         Opioid Medication Information    Pain medications are among the most commonly prescribed medicines, so we are including this information for all our patients. If you did not receive pain medication or get a prescription for pain medicine, you can ignore it.     You may have been given a prescription for an opioid (narcotic) pain medicine and/or have received a pain medicine while here in the Emergency Department. These medicines can make you drowsy or impaired. You must not drive, operate dangerous equipment, or engage in any other dangerous activities while taking these medications. If you drive while taking these medications, you could be arrested for DUI, or driving under the influence. Do not drink any alcohol while you are taking these medications.     Opioid pain medications can cause addiction. If you have a history of chemical dependency of any type, you are at a higher risk of becoming addicted to pain medications.  Only take these prescribed medications to treat your pain when all other options have been tried. Take it for as short a time and as few doses as possible. Store your pain pills in a secure place, as they are frequently stolen and provide a dangerous opportunity for children or visitors in your house to start abusing these powerful medications. We will not replace any lost or stolen medicine.  As soon as your pain is better, you should flush all your remaining medication.     Many prescription pain medications contain Tylenol  (acetaminophen), including Vicodin , Tylenol #3 , Norco , Lortab , and Percocet .  You should not take any extra pills of Tylenol  if you are using these prescription medications or you can get very sick.  Do not ever take more than 3000 mg of acetaminophen in any 24 hour period.    All opioids tend to cause constipation. Drink plenty of water and eat foods that have a lot of fiber, such as fruits, vegetables, prune juice, apple juice and high fiber  cereal.  Take a laxative if you don t move your bowels at least every other day. Miralax , Milk of Magnesia, Colace , or Senna  can be used to keep you regular.      Remember that you can always come back to the Emergency Department if you are not able to see your regular doctor in the amount of time listed above, if you get any new symptoms, or if there is anything that worries you.          24 Hour Appointment Hotline       To make an appointment at any [facility], call [phone] ([phone]). If you don't have a family doctor or clinic, we will help you find one. [facility] clinics are conveniently located to serve the needs of you and your family.             Review of your medicines      START taking        Dose / Directions Last dose taken    oxyCODONE-acetaminophen 5-325 MG per tablet   Commonly known as:  PERCOCET   Dose:  1-2 tablet   Quantity:  20 tablet        Take 1-2 tablets by mouth every 4 hours as needed for moderate to severe pain   Refills:  0          Our records show that you are taking the medicines listed below. If these are incorrect, please call your family doctor or clinic.        Dose / Directions Last dose taken    ADDERALL PO        Refills:  0        benzonatate 100 MG capsule   Commonly known as:  TESSALON   Dose:  100 mg   Quantity:  30 capsule        Take 1 capsule (100 mg) by mouth 3 times daily as needed   Refills:  0        fluconazole 150 MG tablet   Commonly known as:  DIFLUCAN   Dose:  150 mg   Quantity:  4 tablet        Take 1 tablet (150 mg) by mouth every 3 days   Refills:  0        lamoTRIgine 150 MG tablet   Commonly known as:  LaMICtal        Refills:  1        LYRICA 50 MG capsule   Generic drug:  pregabalin        TK 1 C PO TID.   Refills:  0        ORTHO TRI-CYCLEN LO 0.18/0.215/0.25 MG-25 MCG per tablet   Dose:  1 tablet   Generic drug:  norgestim-eth estrad triphasic        Take 1 tablet by mouth daily   Refills:  0        penicillin G benzathine [id number]  UNIT/2ML injection   Commonly known as:  BICILLIN L-A   Quantity:  2 mL        1.2 million units injected IM x 1   Refills:  0        sertraline 100 MG tablet   Commonly known as:  ZOLOFT   Dose:  100 mg   Quantity:  30 tablet        Take 1 tablet (100 mg) by mouth At Bedtime   Refills:  0        VITAMIN D (CHOLECALCIFEROL) PO   Dose:  4000 Units        Take 4,000 Units by mouth daily   Refills:  0                Prescriptions were sent or printed at these locations (1 Prescription)                   Other Prescriptions                Printed at Department/Unit printer (1 of 1)         oxyCODONE-acetaminophen (PERCOCET) 5-325 MG per tablet                Procedures and tests performed during your visit     *UA reflex to Microscopic    Abdomen XR, 2 vw, flat and upright    CBC with platelets differential    Comprehensive metabolic panel    Lipase    Peripheral IV: Standard    US Pelvic Complete w Transvaginal      Orders Needing Specimen Collection     None      Pending Results     Date and Time Order Name Status Description    5/15/2017 1603 US Pelvic Complete w Transvaginal Preliminary             Pending Culture Results     No orders found from 5/13/2017 to 5/16/2017.            Pending Results Instructions     If you had any lab results that were not finalized at the time of your Discharge, you can call the ED Lab Result RN at [phone]. You will be contacted by this team for any positive Lab results or changes in treatment. The nurses are available 7 days a week from 10A to 6:30P.  You can leave a message 24 hours per day and they will return your call.        Test Results From Your Hospital Stay        5/15/2017  3:42 PM      Component Results     Component Value Ref Range & Units Status    WBC 8.7 4.0 - 11.0 10e9/L Final    RBC Count 4.48 3.8 - 5.2 10e12/L Final    Hemoglobin 13.7 11.7 - 15.7 g/dL Final    Hematocrit 39.8 35.0 - 47.0 % Final    MCV 89 78 - 100 fl Final    MCH 30.6 26.5 - 33.0 pg Final     MCHC 34.4 31.5 - 36.5 g/dL Final    RDW 13.6 10.0 - 15.0 % Final    Platelet Count 311 150 - 450 10e9/L Final    Diff Method Automated Method  Final    % Neutrophils 56.6 % Final    % Lymphocytes 35.8 % Final    % Monocytes 6.0 % Final    % Eosinophils 1.1 % Final    % Basophils 0.3 % Final    % Immature Granulocytes 0.2 % Final    Nucleated RBCs 0 0 /100 Final    Absolute Neutrophil 4.9 1.6 - 8.3 10e9/L Final    Absolute Lymphocytes 3.1 0.8 - 5.3 10e9/L Final    Absolute Monocytes 0.5 0.0 - 1.3 10e9/L Final    Absolute Eosinophils 0.1 0.0 - 0.7 10e9/L Final    Absolute Basophils 0.0 0.0 - 0.2 10e9/L Final    Abs Immature Granulocytes 0.0 0 - 0.4 10e9/L Final    Absolute Nucleated RBC 0.0  Final         5/15/2017  4:01 PM      Component Results     Component Value Ref Range & Units Status    Sodium 140 133 - 144 mmol/L Final    Potassium 4.1 3.4 - 5.3 mmol/L Final    Chloride 107 94 - 109 mmol/L Final    Carbon Dioxide 25 20 - 32 mmol/L Final    Anion Gap 8 3 - 14 mmol/L Final    Glucose 83 70 - 99 mg/dL Final    Urea Nitrogen 8 7 - 30 mg/dL Final    Creatinine 0.63 0.52 - 1.04 mg/dL Final    GFR Estimate >90  Non  GFR Calc   >60 mL/min/1.7m2 Final    GFR Estimate If Black >90   GFR Calc   >60 mL/min/1.7m2 Final    Calcium 9.1 8.5 - 10.1 mg/dL Final    Bilirubin Total 0.2 0.2 - 1.3 mg/dL Final    Albumin 3.8 3.4 - 5.0 g/dL Final    Protein Total 7.7 6.8 - 8.8 g/dL Final    Alkaline Phosphatase 77 40 - 150 U/L Final    ALT 19 0 - 50 U/L Final    AST 13 0 - 45 U/L Final         5/15/2017  3:58 PM      Component Results     Component Value Ref Range & Units Status    Lipase 156 73 - 393 U/L Final         5/15/2017  4:19 PM      Narrative     XR ABDOMEN 2 VW 5/15/2017 4:15 PM    HISTORY: Abdominal pain.    COMPARISON: 9/16/2016    FINDINGS: No evidence of free intraperitoneal air. No significant  air-fluid levels. The bowel gas pattern is nonobstructive.        Impression     IMPRESSION:  Nonobstructive gas pattern.    CHARLOTTE PADRON MD         5/15/2017  4:53 PM      Narrative     COMPLETE PELVIC ULTRASOUND WITH TRANSVAGINAL  5/15/2017 4:47 PM     HISTORY: Right pelvic pain.    TECHNIQUE: Pelvic ultrasound. Both transabdominal and transvaginal  sonography was performed. The transvaginal sonography was performed  due to inadequate bladder fullness and to better evaluate the right  ovary as there has been previous hysterectomy and left oophorectomy.    COMPARISON: 2/8/2017    FINDINGS: There has been previous hysterectomy and oophorectomy on the  left. The right ovary is visualized using transvaginal sonography and  measures 2.7 x 2.7 x 2.2 cm. The right ovary appears normal with  normal arterial and venous blood flow on color and Doppler waveform  analysis. Multiple follicles are noted in the right ovary similar to  that seen on the prior exam. No free fluid or adnexal masses are  present.        Impression     IMPRESSION:  1. Previous hysterectomy and left oophorectomy.  2. Normal-appearing right ovary with multiple follicles.  3. No evidence for any adnexal masses or free fluid.         5/15/2017  5:18 PM      Component Results     Component Value Ref Range & Units Status    Color Urine Yellow  Final    Appearance Urine Clear  Final    Glucose Urine Negative NEG mg/dL Final    Bilirubin Urine Negative NEG Final    Ketones Urine Negative NEG mg/dL Final    Specific Gravity Urine 1.020 1.003 - 1.035 Final    Blood Urine Negative NEG Final    pH Urine 6.5 5.0 - 7.0 pH Final    Protein Albumin Urine Negative NEG mg/dL Final    Urobilinogen Urine 0.2 0.2 - 1.0 EU/dL Final    Nitrite Urine Negative NEG Final    Leukocyte Esterase Urine Negative NEG Final    Source Midstream Urine  Final                Clinical Quality Measure: Blood Pressure Screening     Your blood pressure was checked while you were in the emergency department today. The last reading we obtained was  BP: 114/78 . Please read the  guidelines below about what these numbers mean and what you should do about them.  If your systolic blood pressure (the top number) is less than 120 and your diastolic blood pressure (the bottom number) is less than 80, then your blood pressure is normal. There is nothing more that you need to do about it.  If your systolic blood pressure (the top number) is 120-139 or your diastolic blood pressure (the bottom number) is 80-89, your blood pressure may be higher than it should be. You should have your blood pressure rechecked within a year by a primary care provider.  If your systolic blood pressure (the top number) is 140 or greater or your diastolic blood pressure (the bottom number) is 90 or greater, you may have high blood pressure. High blood pressure is treatable, but if left untreated over time it can put you at risk for heart attack, stroke, or kidney failure. You should have your blood pressure rechecked by a primary care provider within the next 4 weeks.  If your provider in the emergency department today gave you specific instructions to follow-up with your doctor or provider even sooner than that, you should follow that instruction and not wait for up to 4 weeks for your follow-up visit.        Thank you for choosing [facility]       Thank you for choosing [facility] for your care. Our goal is always to provide you with excellent care. Hearing back from our patients is one way we can continue to improve our services. Please take a few minutes to complete the written survey that you may receive in the mail after you visit with us. Thank you!        [employer] [facility] Information     [employer] gives you secure access to your electronic health record. If you see a primary care provider, you can also send messages to your care team and make appointments. If you have questions, please call your primary care clinic.  If you do not have a primary care provider, please call [phone] and they will assist you.        Care  EveryWhere ID     This is your Care EveryWhere ID. This could be used by other organizations to access your [facility] medical records  [id number]        After Visit Summary       This is your record. Keep this with you and show to your community pharmacist(s) and doctor(s) at your next visit.

## 2017-05-15 NOTE — ED AVS SNAPSHOT
Emergency Department    [address] [facility] [address]    Phone:  [phone]    Fax:  [phone]                                       Julia Smith   MRN: [id number]    Department:   Emergency Department   Date of Visit:  5/15/2017           After Visit Summary Signature Page     I have received my discharge instructions, and my questions have been answered. I have discussed any challenges I see with this plan with the nurse or doctor.    ..........................................................................................................................................  Patient/Patient Representative Signature      ..........................................................................................................................................  Patient Representative Print Name and Relationship to Patient    ..................................................               ................................................  Date                                            Time    ..........................................................................................................................................  Reviewed by Signature/Title    ...................................................              ..............................................  Date                                                            Time

## 2017-05-15 NOTE — ED PROVIDER NOTES
"  History     Chief Complaint:  Abdominal Pain     HPI   Julia Smith is a 32 year old female with history of abdominal pain, PCOS, endometriosis, and bipolar disorder who presents for the second time in 2 weeks for abdominal pain, nausea and vomiting. The patient reports that she has a history of abdominal pain though to be secondary to \"adhesions wrapping around her bowel,\" per her report. She underwent hysterectomy and left oophorectomy in January for treatment of this, however she notes that she has had intermittent abdominal pain since that time. She was seen here in the ED on 05/03/2017 where she had another CT of her Abdomen/pelvis which returned negative (see below). She was ultimately discharged after improvement with 3 doses of 0.5 mg IV Dilaudid and a PO challenge.    Since that time, the patient states that she has continued to have intermittent pain to her RLQ and presents today requesting to \"please take my ovary out.\" Her current episode of pain has been present constantly, waxing and waning in severity, which she describes as a \"sharp and pulling.\" The pain is similar to her previous pain, though worse. She has additionally been nauseated with 3 episodes of non-bloody vomiting with altogether normal bowel movements. She follows with GI and OBGYN and states that she has plans to have a right oophorectomy after having a GI rule out. The patient denies any fever, chills, vaginal bleeding or discharge, or changes in bowel or bladder habits. The patient is status post appendectomy.     CT Abdomen/pelvis / contrast 05/03/2017:  1. No acute appearing abnormality in the abdomen or pelvis.  2. Prior hysterectomy and appendectomy again noted.    Allergies:  Decadron [Dexamethasone], muscle cramps  Depakote [Divalproex Sodium], vision issues  Ibuprofen, nausea and vomiting  Tramadol, \"reactions\"  Valproic Acid, rash     Medications:    Adderall  Bicillin injections  Lamictal  Lyrica  Zoloft     Past Medical " "History:    Bipolar 2 disorder  Fibromyalgia  GERD  Chronic pain  Migraine headache  PCOS  Panic attacks     Past Surgical History:    D and X x3  Oophorectomy  Salpingectomy  Hysterectomy     Family History:    Mother positive for depression    Social History:  Current everyday smoker  Positive for alcohol use.    Marital Status:   [2]    Review of Systems   Constitutional: Negative for chills and fever.   Gastrointestinal: Positive for abdominal pain (RLQ), nausea and vomiting. Negative for diarrhea.   Genitourinary: Negative for vaginal bleeding and vaginal discharge.   All other systems reviewed and are negative.    Physical Exam   First Vitals:  BP: 110/67  Pulse: 90  Heart Rate: 90  Temp: 99.1  F (37.3  C)  Resp: 16  Height: 162.6 cm (5' 4\")  Weight: 65.8 kg (145 lb)  SpO2: 99 %      Physical Exam  General - tearful young woman holding her side, requesting surgery     Eye:  Pupils are equal, round, and reactive.  Extraocular movements intact.    ENT:  No rhinorrhea.  Moist mucus membranes.  Normal tongue and tonsil.    Cardiac:  Regular rate and rhythm.  No murmurs, gallops, or rubs.    Pulmonary:  Clear to auscultation bilaterally.  No wheezes, rales, or rhonchi.    Abdomen:  Focal tenderness deep in the right pelvis without rebound or guarding     Musculoskeletal:  Normal movement of all extremities without evidence for deficit.    Skin:  Warm and dry without rashes.    Neurologic:  Non-focal exam without asymmetric weakness or numbness.     Psychiatric:  Normal affect with appropriate interaction with examiner.      Emergency Department Course   Imaging:  Radiographic findings were communicated with the patient who voiced understanding of the findings.    US Pelvic complete w/ transvaginal  1. Previous hysterectomy and left oophorectomy.  2. Normal-appearing right ovary with multiple follicles.  3. No evidence for any adnexal masses or free fluid. As per radiology.     XR Abdomen 2 views: "   Nonobstructing gas pattern. As per radiology.     Laboratory:  CBC: WBC: 8.7, HGB: 13.7, PLT: 311  CMP: AWNL (Creatinine: 0.63)    Lipase: 156    UA: all negative     Interventions:  1529 NS 1L IV  1535 Zofran, 4 mg, IV injection  1607 Dilaudid, 1 mg, IV injection  1611 Toradol, 30 mg, IV injection    Emergency Department Course:  Nursing notes and vitals reviewed. I performed an exam of the patient as documented above.      Blood drawn. This was sent to the lab for further testing, results above.    The patient was sent for a pelvic US with transvaginal while in the emergency department, findings above.      The patient provided a urine sample here in the emergency department. This was sent for laboratory testing, findings above.     1739 I reevaluated the patient and provided an update in regards to her ED course.       Findings and plan explained to the Patient. Patient discharged home with instructions regarding supportive care, medications, and reasons to return. The importance of close follow-up was reviewed.     I personally reviewed the laboratory results with the Patient and answered all related questions prior to discharge.      Impression & Plan    Medical Decision Making:  Julia Smith is an unfortunate 32 year old female with chronic abdominal pain who is status post hysterectomy and oophorectomy on the left due to endometriosis, small bowel obstruction, and polycystic ovarian syndrome who presents with pain on the right side. She describes an aching sensation which is intensified over the past day or two. This does have spells that become exceedingly severe and I feel this is unlikely to represent torsion. I also feel that it is unlikely to represent SBO as she otherwise has her chronic nausea and vomiting, but has been having normal bowel movements. A laboratory investigation is unremarkable, her urine is clear. Pelvic ultrasound shows multiple follicles on the right ovary, but no evidence of  cyst or impeded blood flow. Abdominal xray with no signs of bowel obstruction. She feels markedly improved with the above interventions. I feel this is an exacerbation of her chronic pain and she will be discharged with antiinflammatories and a limited supply of percocet. I urged her to follow up closely with her gynecologist  She knows that she should have no hesitation to return immediately for any worsening of her condition or other emergent concern.     Diagnosis:    ICD-10-CM    1. Pelvic pain in female R10.2    2. Ovarian follicular cyst N83.00      Discharge Medications:  Discharge Medication List as of 5/15/2017  5:50 PM      START taking these medications    Details   oxyCODONE-acetaminophen (PERCOCET) 5-325 MG per tablet Take 1-2 tablets by mouth every 4 hours as needed for moderate to severe pain, Disp-20 tablet, R-0, Local Print           I, Juan Neff, am serving as a scribe on 5/15/2017 at 3:19 PM to personally document services performed by Trierweiler, Chad A, MD based on my observations and the provider's statements to me.      Juan Neff  5/15/2017    EMERGENCY DEPARTMENT       Trierweiler, Chad A, MD  05/16/17 0902

## 2017-05-25 ENCOUNTER — HOSPITAL ENCOUNTER (EMERGENCY)
Facility: CLINIC | Age: 32
Discharge: HOME | End: 2017-05-25
Attending: EMERGENCY MEDICINE | Admitting: EMERGENCY MEDICINE
Payer: COMMERCIAL

## 2017-05-25 ENCOUNTER — APPOINTMENT (OUTPATIENT)
Dept: GENERAL RADIOLOGY | Facility: CLINIC | Age: 32
End: 2017-05-25
Attending: EMERGENCY MEDICINE
Payer: COMMERCIAL

## 2017-05-25 VITALS
BODY MASS INDEX: 25.1 KG/M2 | HEART RATE: 74 BPM | TEMPERATURE: 98.4 F | HEIGHT: 64 IN | RESPIRATION RATE: 14 BRPM | OXYGEN SATURATION: 96 % | SYSTOLIC BLOOD PRESSURE: 109 MMHG | DIASTOLIC BLOOD PRESSURE: 75 MMHG | WEIGHT: 147 LBS

## 2017-05-25 DIAGNOSIS — R07.9 ACUTE CHEST PAIN: ICD-10-CM

## 2017-05-25 LAB
ALBUMIN UR-MCNC: 30 MG/DL
ANION GAP SERPL CALCULATED.3IONS-SCNC: 5 MMOL/L (ref 3–14)
APPEARANCE UR: CLEAR
BACTERIA #/AREA URNS HPF: ABNORMAL /HPF
BASOPHILS # BLD AUTO: 0 10E9/L (ref 0–0.2)
BASOPHILS NFR BLD AUTO: 0.3 %
BILIRUB UR QL STRIP: ABNORMAL
BUN SERPL-MCNC: 9 MG/DL (ref 7–30)
CALCIUM SERPL-MCNC: 8.8 MG/DL (ref 8.5–10.1)
CHLORIDE SERPL-SCNC: 107 MMOL/L (ref 94–109)
CO2 SERPL-SCNC: 27 MMOL/L (ref 20–32)
COLOR UR AUTO: YELLOW
CREAT SERPL-MCNC: 0.59 MG/DL (ref 0.52–1.04)
D DIMER PPP FEU-MCNC: NORMAL UG/ML FEU (ref 0–0.5)
DIFFERENTIAL METHOD BLD: ABNORMAL
EOSINOPHIL # BLD AUTO: 0.1 10E9/L (ref 0–0.7)
EOSINOPHIL NFR BLD AUTO: 1 %
ERYTHROCYTE [DISTWIDTH] IN BLOOD BY AUTOMATED COUNT: 13.8 % (ref 10–15)
GFR SERPL CREATININE-BSD FRML MDRD: ABNORMAL ML/MIN/1.7M2
GLUCOSE SERPL-MCNC: 102 MG/DL (ref 70–99)
GLUCOSE UR STRIP-MCNC: NEGATIVE MG/DL
HCT VFR BLD AUTO: 38 % (ref 35–47)
HGB BLD-MCNC: 13.3 G/DL (ref 11.7–15.7)
HGB UR QL STRIP: ABNORMAL
IMM GRANULOCYTES # BLD: 0 10E9/L (ref 0–0.4)
IMM GRANULOCYTES NFR BLD: 0.2 %
INTERPRETATION ECG - MUSE: NORMAL
KETONES UR STRIP-MCNC: ABNORMAL MG/DL
LEUKOCYTE ESTERASE UR QL STRIP: NEGATIVE
LYMPHOCYTES # BLD AUTO: 3.2 10E9/L (ref 0.8–5.3)
LYMPHOCYTES NFR BLD AUTO: 28.8 %
MCH RBC QN AUTO: 30.9 PG (ref 26.5–33)
MCHC RBC AUTO-ENTMCNC: 35 G/DL (ref 31.5–36.5)
MCV RBC AUTO: 88 FL (ref 78–100)
MONOCYTES # BLD AUTO: 0.6 10E9/L (ref 0–1.3)
MONOCYTES NFR BLD AUTO: 5 %
MUCOUS THREADS #/AREA URNS LPF: PRESENT /LPF
NEUTROPHILS # BLD AUTO: 7.2 10E9/L (ref 1.6–8.3)
NEUTROPHILS NFR BLD AUTO: 64.7 %
NITRATE UR QL: NEGATIVE
NON-SQ EPI CELLS #/AREA URNS LPF: ABNORMAL /LPF
NRBC # BLD AUTO: 0 10*3/UL
NRBC BLD AUTO-RTO: 0 /100
PH UR STRIP: 7 PH (ref 5–7)
PLATELET # BLD AUTO: 282 10E9/L (ref 150–450)
POTASSIUM SERPL-SCNC: 3.8 MMOL/L (ref 3.4–5.3)
RBC # BLD AUTO: 4.3 10E12/L (ref 3.8–5.2)
RBC #/AREA URNS AUTO: ABNORMAL /HPF (ref 0–2)
SODIUM SERPL-SCNC: 139 MMOL/L (ref 133–144)
SP GR UR STRIP: 1.02 (ref 1–1.03)
TROPONIN I SERPL-MCNC: NORMAL UG/L (ref 0–0.04)
URN SPEC COLLECT METH UR: ABNORMAL
UROBILINOGEN UR STRIP-ACNC: 0.2 EU/DL (ref 0.2–1)
WBC # BLD AUTO: 11.1 10E9/L (ref 4–11)
WBC #/AREA URNS AUTO: ABNORMAL /HPF (ref 0–2)

## 2017-05-25 PROCEDURE — 99285 EMERGENCY DEPT VISIT HI MDM: CPT | Mod: 25

## 2017-05-25 PROCEDURE — 85025 COMPLETE CBC W/AUTO DIFF WBC: CPT | Performed by: EMERGENCY MEDICINE

## 2017-05-25 PROCEDURE — 25000128 H RX IP 250 OP 636: Performed by: EMERGENCY MEDICINE

## 2017-05-25 PROCEDURE — 25000132 ZZH RX MED GY IP 250 OP 250 PS 637: Performed by: EMERGENCY MEDICINE

## 2017-05-25 PROCEDURE — 25000125 ZZHC RX 250: Performed by: EMERGENCY MEDICINE

## 2017-05-25 PROCEDURE — 93005 ELECTROCARDIOGRAM TRACING: CPT

## 2017-05-25 PROCEDURE — 80048 BASIC METABOLIC PNL TOTAL CA: CPT | Performed by: EMERGENCY MEDICINE

## 2017-05-25 PROCEDURE — 96374 THER/PROPH/DIAG INJ IV PUSH: CPT

## 2017-05-25 PROCEDURE — 96375 TX/PRO/DX INJ NEW DRUG ADDON: CPT

## 2017-05-25 PROCEDURE — 85379 FIBRIN DEGRADATION QUANT: CPT | Performed by: EMERGENCY MEDICINE

## 2017-05-25 PROCEDURE — 71020 XR CHEST 2 VW: CPT

## 2017-05-25 PROCEDURE — 84484 ASSAY OF TROPONIN QUANT: CPT | Performed by: EMERGENCY MEDICINE

## 2017-05-25 PROCEDURE — 81001 URINALYSIS AUTO W/SCOPE: CPT | Performed by: EMERGENCY MEDICINE

## 2017-05-25 PROCEDURE — 96361 HYDRATE IV INFUSION ADD-ON: CPT

## 2017-05-25 RX ORDER — HYDROMORPHONE HYDROCHLORIDE 1 MG/ML
0.5 INJECTION, SOLUTION INTRAMUSCULAR; INTRAVENOUS; SUBCUTANEOUS ONCE
Status: COMPLETED | OUTPATIENT
Start: 2017-05-25 | End: 2017-05-25

## 2017-05-25 RX ORDER — KETOROLAC TROMETHAMINE 30 MG/ML
30 INJECTION, SOLUTION INTRAMUSCULAR; INTRAVENOUS ONCE
Status: COMPLETED | OUTPATIENT
Start: 2017-05-25 | End: 2017-05-25

## 2017-05-25 RX ORDER — ONDANSETRON 4 MG/1
4 TABLET, ORALLY DISINTEGRATING ORAL ONCE
Status: COMPLETED | OUTPATIENT
Start: 2017-05-25 | End: 2017-05-25

## 2017-05-25 RX ORDER — LIDOCAINE 40 MG/G
CREAM TOPICAL
Status: DISCONTINUED | OUTPATIENT
Start: 2017-05-25 | End: 2017-05-25 | Stop reason: HOSPADM

## 2017-05-25 RX ORDER — OXYCODONE AND ACETAMINOPHEN 5; 325 MG/1; MG/1
1 TABLET ORAL ONCE
Status: COMPLETED | OUTPATIENT
Start: 2017-05-25 | End: 2017-05-25

## 2017-05-25 RX ADMIN — KETOROLAC TROMETHAMINE 30 MG: 30 INJECTION, SOLUTION INTRAMUSCULAR at 17:08

## 2017-05-25 RX ADMIN — OXYCODONE HYDROCHLORIDE AND ACETAMINOPHEN 1 TABLET: 5; 325 TABLET ORAL at 19:33

## 2017-05-25 RX ADMIN — ONDANSETRON 4 MG: 4 TABLET, ORALLY DISINTEGRATING ORAL at 17:19

## 2017-05-25 RX ADMIN — LIDOCAINE HYDROCHLORIDE 30 ML: 20 SOLUTION ORAL; TOPICAL at 17:41

## 2017-05-25 RX ADMIN — HYDROMORPHONE HYDROCHLORIDE 0.5 MG: 1 INJECTION, SOLUTION INTRAMUSCULAR; INTRAVENOUS; SUBCUTANEOUS at 18:22

## 2017-05-25 RX ADMIN — SODIUM CHLORIDE 1000 ML: 9 INJECTION, SOLUTION INTRAVENOUS at 18:22

## 2017-05-25 NOTE — ED AVS SNAPSHOT
Emergency Department    [address] [place] [address]    Phone:  [phone]    Fax:  [phone]                                       Julia Smith   MRN: [id number]    Department:   Emergency Department   Date of Visit:  5/25/2017           Patient Information     Date Of Birth          1985        Your diagnoses for this visit were:     Acute chest pain        You were seen by Yinka Silver MD.      Follow-up Information     Follow up with your primary MD In 4 days.        Discharge Instructions       Discharge Instructions  Chest Pain    You have been seen today for chest pain or discomfort.  At this time, your doctor has found no signs that your chest pain is due to a serious or life-threatening condition, (or you have declined more testing and/or admission to the hospital). However, sometimes there is a serious problem that does not show up right away. Your evaluation today may not be complete and you may need further testing and evaluation.     You need to follow-up with your regular doctor within 3 days.    Return to the Emergency Department if:    Your chest pain changes, gets worse, starts to happen more often, or comes with less activity.    You are short of breath.    You get very weak or tired.    You pass out or faint.    You have any new symptoms, like fever, cough, numb legs, or you cough up blood.    You have anything else that worries you.    Until you follow-up with your regular doctor please do the following:    Take one aspirin daily unless you have an allergy or are told not to by your doctor.    If a stress test appointment has been made, go to the appointment.    If you have questions, contact your regular doctor.    If your doctor today has told you to follow-up with your regular doctor, it is very important that you make an appointment with your clinic and go to the appointment.  If you do not follow-up with your primary doctor, it may result in missing  an important development which could result in permanent injury or disability and/or lasting pain.  If there is any problem keeping your appointment, call your doctor or return to the Emergency Department.    If you were given a prescription for medicine here today, be sure to read all of the information (including the package insert) that comes with your prescription.  This will include important information about the medicine, its side effects, and any warnings that you need to know about.  The pharmacist who fills the prescription can provide more information and answer questions you may have about the medicine.  If you have questions or concerns that the pharmacist cannot address, please call or return to the Emergency Department.     Opioid Medication Information    Pain medications are among the most commonly prescribed medicines, so we are including this information for all our patients. If you did not receive pain medication or get a prescription for pain medicine, you can ignore it.     You may have been given a prescription for an opioid (narcotic) pain medicine and/or have received a pain medicine while here in the Emergency Department. These medicines can make you drowsy or impaired. You must not drive, operate dangerous equipment, or engage in any other dangerous activities while taking these medications. If you drive while taking these medications, you could be arrested for DUI, or driving under the influence. Do not drink any alcohol while you are taking these medications.     Opioid pain medications can cause addiction. If you have a history of chemical dependency of any type, you are at a higher risk of becoming addicted to pain medications.  Only take these prescribed medications to treat your pain when all other options have been tried. Take it for as short a time and as few doses as possible. Store your pain pills in a secure place, as they are frequently stolen and provide a dangerous  opportunity for children or visitors in your house to start abusing these powerful medications. We will not replace any lost or stolen medicine.  As soon as your pain is better, you should flush all your remaining medication.     Many prescription pain medications contain Tylenol  (acetaminophen), including Vicodin , Tylenol #3 , Norco , Lortab , and Percocet .  You should not take any extra pills of Tylenol  if you are using these prescription medications or you can get very sick.  Do not ever take more than 3000 mg of acetaminophen in any 24 hour period.    All opioids tend to cause constipation. Drink plenty of water and eat foods that have a lot of fiber, such as fruits, vegetables, prune juice, apple juice and high fiber cereal.  Take a laxative if you don t move your bowels at least every other day. Miralax , Milk of Magnesia, Colace , or Senna  can be used to keep you regular.      Remember that you can always come back to the Emergency Department if you are not able to see your regular doctor in the amount of time listed above, if you get any new symptoms, or if there is anything that worries you.          24 Hour Appointment Hotline       To make an appointment at any [facility], call [phone] ([phone]). If you don't have a family doctor or clinic, we will help you find one. [facility] are conveniently located to serve the needs of you and your family.             Review of your medicines      Our records show that you are taking the medicines listed below. If these are incorrect, please call your family doctor or clinic.        Dose / Directions Last dose taken    ADDERALL PO        Refills:  0        benzonatate 100 MG capsule   Commonly known as:  TESSALON   Dose:  100 mg   Quantity:  30 capsule        Take 1 capsule (100 mg) by mouth 3 times daily as needed   Refills:  0        fluconazole 150 MG tablet   Commonly known as:  DIFLUCAN   Dose:  150 mg   Quantity:  4 tablet         Take 1 tablet (150 mg) by mouth every 3 days   Refills:  0        lamoTRIgine 150 MG tablet   Commonly known as:  LaMICtal        Refills:  1        LYRICA 50 MG capsule   Generic drug:  pregabalin        TK 1 C PO TID.   Refills:  0        ORTHO TRI-CYCLEN LO 0.18/0.215/0.25 MG-25 MCG per tablet   Dose:  1 tablet   Generic drug:  norgestim-eth estrad triphasic        Take 1 tablet by mouth daily   Refills:  0        oxyCODONE-acetaminophen 5-325 MG per tablet   Commonly known as:  PERCOCET   Dose:  1-2 tablet   Quantity:  20 tablet        Take 1-2 tablets by mouth every 4 hours as needed for moderate to severe pain   Refills:  0        penicillin G benzathine [id number] UNIT/2ML injection   Commonly known as:  BICILLIN L-A   Quantity:  2 mL        1.2 million units injected IM x 1   Refills:  0        sertraline 100 MG tablet   Commonly known as:  ZOLOFT   Dose:  100 mg   Quantity:  30 tablet        Take 1 tablet (100 mg) by mouth At Bedtime   Refills:  0        VITAMIN D (CHOLECALCIFEROL) PO   Dose:  4000 Units        Take 4,000 Units by mouth daily   Refills:  0                Procedures and tests performed during your visit     *UA reflex to Microscopic    Basic metabolic panel    CBC with platelets differential    D dimer quantitative    EKG 12 lead    Peripheral IV: Standard    Troponin I    Urine Microscopic    XR Chest 2 Views      Orders Needing Specimen Collection     None      Pending Results     No orders found from 5/23/2017 to 5/26/2017.            Pending Culture Results     No orders found from 5/23/2017 to 5/26/2017.            Pending Results Instructions     If you had any lab results that were not finalized at the time of your Discharge, you can call the ED Lab Result RN at [phone]. You will be contacted by this team for any positive Lab results or changes in treatment. The nurses are available 7 days a week from 10A to 6:30P.  You can leave a message 24 hours per day and they will return your  call.        Test Results From Your Hospital Stay        5/25/2017  5:13 PM      Component Results     Component Value Ref Range & Units Status    WBC 11.1 (H) 4.0 - 11.0 10e9/L Final    RBC Count 4.30 3.8 - 5.2 10e12/L Final    Hemoglobin 13.3 11.7 - 15.7 g/dL Final    Hematocrit 38.0 35.0 - 47.0 % Final    MCV 88 78 - 100 fl Final    MCH 30.9 26.5 - 33.0 pg Final    MCHC 35.0 31.5 - 36.5 g/dL Final    RDW 13.8 10.0 - 15.0 % Final    Platelet Count 282 150 - 450 10e9/L Final    Diff Method Automated Method  Final    % Neutrophils 64.7 % Final    % Lymphocytes 28.8 % Final    % Monocytes 5.0 % Final    % Eosinophils 1.0 % Final    % Basophils 0.3 % Final    % Immature Granulocytes 0.2 % Final    Nucleated RBCs 0 0 /100 Final    Absolute Neutrophil 7.2 1.6 - 8.3 10e9/L Final    Absolute Lymphocytes 3.2 0.8 - 5.3 10e9/L Final    Absolute Monocytes 0.6 0.0 - 1.3 10e9/L Final    Absolute Eosinophils 0.1 0.0 - 0.7 10e9/L Final    Absolute Basophils 0.0 0.0 - 0.2 10e9/L Final    Abs Immature Granulocytes 0.0 0 - 0.4 10e9/L Final    Absolute Nucleated RBC 0.0  Final         5/25/2017  5:27 PM      Component Results     Component Value Ref Range & Units Status    Sodium 139 133 - 144 mmol/L Final    Potassium 3.8 3.4 - 5.3 mmol/L Final    Chloride 107 94 - 109 mmol/L Final    Carbon Dioxide 27 20 - 32 mmol/L Final    Anion Gap 5 3 - 14 mmol/L Final    Glucose 102 (H) 70 - 99 mg/dL Final    Urea Nitrogen 9 7 - 30 mg/dL Final    Creatinine 0.59 0.52 - 1.04 mg/dL Final    GFR Estimate >90  Non  GFR Calc   >60 mL/min/1.7m2 Final    GFR Estimate If Black >90   GFR Calc   >60 mL/min/1.7m2 Final    Calcium 8.8 8.5 - 10.1 mg/dL Final         5/25/2017  5:31 PM      Component Results     Component Value Ref Range & Units Status    D Dimer  0.0 - 0.50 ug/ml FEU Final    <0.3  This D-dimer assay is intended for use in conjuntion with a clinical pretest   probability assessment model to exclude pulmonary  embolism (PE) and as an aid   in the diagnosis of deep venous thrombosis (DVT) in outpatients suspected of PE   or DVT. The cut-off value is 0.5 g/mL FEU.           5/25/2017  5:31 PM      Component Results     Component Value Ref Range & Units Status    Troponin I ES  0.000 - 0.045 ug/L Final    <0.015  The 99th percentile for upper reference range is 0.045 ug/L.  Troponin values in   the range of 0.045 - 0.120 ug/L may be associated with risks of adverse   clinical events.           5/25/2017  5:16 PM      Narrative     XR CHEST 2 VW 5/25/2017 5:13 PM     HISTORY: Chest pain        Impression     IMPRESSION: Negative exam.    DONNA MOONEY MD         5/25/2017  6:50 PM      Component Results     Component Value Ref Range & Units Status    Color Urine Yellow  Final    Appearance Urine Clear  Final    Glucose Urine Negative NEG mg/dL Final    Bilirubin Urine  NEG Final    Small  This is an unconfirmed screening test result. A positive result may be false.   (A)    Ketones Urine Trace (A) NEG mg/dL Final    Specific Gravity Urine 1.020 1.003 - 1.035 Final    Blood Urine Trace (A) NEG Final    pH Urine 7.0 5.0 - 7.0 pH Final    Protein Albumin Urine 30 (A) NEG mg/dL Final    Urobilinogen Urine 0.2 0.2 - 1.0 EU/dL Final    Nitrite Urine Negative NEG Final    Leukocyte Esterase Urine Negative NEG Final    Source Midstream Urine  Final         5/25/2017  6:50 PM      Component Results     Component Value Ref Range & Units Status    WBC Urine O - 2 0 - 2 /HPF Final    RBC Urine O - 2 0 - 2 /HPF Final    Squamous Epithelial /LPF Urine Moderate (A) FEW /LPF Final    Bacteria Urine Few (A) NEG /HPF Final    Mucous Urine Present (A) NEG /LPF Final                Clinical Quality Measure: Blood Pressure Screening     Your blood pressure was checked while you were in the emergency department today. The last reading we obtained was  BP: 114/72 . Please read the guidelines below about what these numbers mean and what you should  do about them.  If your systolic blood pressure (the top number) is less than 120 and your diastolic blood pressure (the bottom number) is less than 80, then your blood pressure is normal. There is nothing more that you need to do about it.  If your systolic blood pressure (the top number) is 120-139 or your diastolic blood pressure (the bottom number) is 80-89, your blood pressure may be higher than it should be. You should have your blood pressure rechecked within a year by a primary care provider.  If your systolic blood pressure (the top number) is 140 or greater or your diastolic blood pressure (the bottom number) is 90 or greater, you may have high blood pressure. High blood pressure is treatable, but if left untreated over time it can put you at risk for heart attack, stroke, or kidney failure. You should have your blood pressure rechecked by a primary care provider within the next 4 weeks.  If your provider in the emergency department today gave you specific instructions to follow-up with your doctor or provider even sooner than that, you should follow that instruction and not wait for up to 4 weeks for your follow-up visit.        Thank you for choosing [facility]       Thank you for choosing [facility] for your care. Our goal is always to provide you with excellent care. Hearing back from our patients is one way we can continue to improve our services. Please take a few minutes to complete the written survey that you may receive in the mail after you visit with us. Thank you!        [employer] Information     [employer] gives you secure access to your electronic health record. If you see a primary care provider, you can also send messages to your care team and make appointments. If you have questions, please call your primary care clinic.  If you do not have a primary care provider, please call [phone] and they will assist you.        Care EveryWhere ID     This is your Care EveryWhere ID. This could be used by  other organizations to access your [facility] medical records  [id number]        After Visit Summary       This is your record. Keep this with you and show to your community pharmacist(s) and doctor(s) at your next visit.

## 2017-05-25 NOTE — ED AVS SNAPSHOT
Emergency Department    [address] [facility] [address]    Phone:  [phone]    Fax:  [phone]                                       Julia Smith   MRN: [id number]    Department:   Emergency Department   Date of Visit:  5/25/2017           After Visit Summary Signature Page     I have received my discharge instructions, and my questions have been answered. I have discussed any challenges I see with this plan with the nurse or doctor.    ..........................................................................................................................................  Patient/Patient Representative Signature      ..........................................................................................................................................  Patient Representative Print Name and Relationship to Patient    ..................................................               ................................................  Date                                            Time    ..........................................................................................................................................  Reviewed by Signature/Title    ...................................................              ..............................................  Date                                                            Time

## 2017-05-27 NOTE — ED PROVIDER NOTES
CHIEF COMPLAINT:  Chest pain.      HISTORY OF PRESENT ILLNESS:  Julia Smith is a 32-year-old female who comes in with chest pain underneath the left breast around 3:40 p.m. with shortness of breath.  She felt slightly lightheaded and that she might pass out.  The patient notes that she has been dealing with abdominal pain, either vomiting or diarrhea or both  since 01/2017.  She has several chronic health problems and notes that she feels it is due to endometriosis and adhesions and is hoping to have repeat surgery from OB.  The patient denies fevers or chills, denies any recent cough.  She does have the belly issues as noted.  Describes it as sharp pain.  She has no cardiac history.  She does use birth control and does smoke, but no history of clots.      PAST MEDICAL HISTORY:  Postoperative seroma, pelvic pain, GERD with esophagitis, syncope, hypercholesterolemia, excessive daytime sleepiness, chronic pain disorder, depression, anemia, bipolar, endometriosis, anxiety, constipation, polycystic ovarian syndrome and fibromyalgia, migraines, TMJ.      PAST SURGICAL HISTORY:  Colonoscopy, cystoscopy and da Prerna-assisted ablation of endometriosis, da Prerna hysterectomy, left salpingectomy, D&C, appendectomy, diagnostic laparoscopy.      SOCIAL HISTORY:  She does smoke, denies alcohol or drugs.      CURRENT MEDICATIONS:  Sertraline, Lamictal, Lyrica, Adderall, Ortho Tri-Cyclen.      ALLERGIES:  Decadron, Depakote, Motrin, tramadol, valproic acid.      REVIEW OF SYSTEMS:  She has chest pain.  She has abdominal pain.  She has vomiting.  She has diarrhea.  No fever.  All other systems are negative.      PHYSICAL EXAMINATION:   GENERAL:  A 32-year-old female, pleasant, holding her left chest.   VITAL SIGNS:  Blood pressure 100/66, pulse 74, temperature 98.4, respiratory 18, SaO2 98% on room air.   EYES:  Normal.   EARS, NOSE, THROAT:  Normal.   CARDIOVASCULAR:  S1, S2, regular.   LUNGS:  Clear, no wheezes, rhonchi or  rales.   ABDOMEN:  Soft, slight tenderness left side.   MUSCULOSKELETAL:  Normal muscle tone.   SKIN:  No rash.   NEUROLOGIC:  Cranial nerves II-XII intact.  Face is symmetric.  Speech is clear.      LABORATORY:  Chem-7, troponin, D-dimer, CBC are all normal.  Urine shows some squamous cells, looks to be contaminated.  Chest x-ray is negative.  EKG is normal without ischemic changes or findings suggestive of pericarditis or PE.      HOSPITAL COURSE:  The patient was seen and evaluated.  Considered broad differential, including chest wall pain, PE, less likely acute coronary syndrome, pericarditis, pain secondary to endometriosis or chronic abdominal symptoms, GERD, anxiety.      Laboratory workup was normal.  She was given Toradol with no significant relief, and then was given a GI cocktail with no significant change.  She was given 0.5 of Dilaudid and a Percocet.  Discussed with her doing further images, but likely a low yield and just adding radiation.  She agrees.  She does have a bit of a positional component to it.  Certainly pericarditis is considered, although I am not seeing any EKG changes or hearing a murmur.  Try a course of anti-inflammatories.      PLAN:  Follow up with her primary.  Continue home medications.      Diagnosis:  Acute atypical chest pain     HE BRITT MD             D: 2017 22:21   T: 2017 07:47   MT: XIN#[id number]      Name:     WADE CALVO   MRN:      [id number]        Account:      [id number]   :      1985           Visit Date:   2017      Document: [id number]

## 2017-06-07 ENCOUNTER — APPOINTMENT (OUTPATIENT)
Dept: ULTRASOUND IMAGING | Facility: CLINIC | Age: 32
End: 2017-06-07
Attending: EMERGENCY MEDICINE
Payer: COMMERCIAL

## 2017-06-07 ENCOUNTER — APPOINTMENT (OUTPATIENT)
Dept: GENERAL RADIOLOGY | Facility: CLINIC | Age: 32
End: 2017-06-07
Attending: EMERGENCY MEDICINE
Payer: COMMERCIAL

## 2017-06-07 ENCOUNTER — HOSPITAL ENCOUNTER (EMERGENCY)
Facility: CLINIC | Age: 32
Discharge: HOME | End: 2017-06-08
Attending: EMERGENCY MEDICINE | Admitting: EMERGENCY MEDICINE
Payer: COMMERCIAL

## 2017-06-07 DIAGNOSIS — R10.31 ABDOMINAL PAIN, RIGHT LOWER QUADRANT: ICD-10-CM

## 2017-06-07 LAB
ALBUMIN SERPL-MCNC: 3.5 G/DL (ref 3.4–5)
ALBUMIN UR-MCNC: NEGATIVE MG/DL
ALP SERPL-CCNC: 63 U/L (ref 40–150)
ALT SERPL W P-5'-P-CCNC: 14 U/L (ref 0–50)
AMORPH CRY #/AREA URNS HPF: ABNORMAL /HPF
ANION GAP SERPL CALCULATED.3IONS-SCNC: 7 MMOL/L (ref 3–14)
APPEARANCE UR: CLEAR
AST SERPL W P-5'-P-CCNC: 9 U/L (ref 0–45)
BASOPHILS # BLD AUTO: 0 10E9/L (ref 0–0.2)
BASOPHILS NFR BLD AUTO: 0.5 %
BILIRUB SERPL-MCNC: 0.1 MG/DL (ref 0.2–1.3)
BILIRUB UR QL STRIP: NEGATIVE
BUN SERPL-MCNC: 6 MG/DL (ref 7–30)
CALCIUM SERPL-MCNC: 8.8 MG/DL (ref 8.5–10.1)
CHLORIDE SERPL-SCNC: 105 MMOL/L (ref 94–109)
CO2 SERPL-SCNC: 27 MMOL/L (ref 20–32)
COLOR UR AUTO: YELLOW
CREAT SERPL-MCNC: 0.61 MG/DL (ref 0.52–1.04)
DIFFERENTIAL METHOD BLD: NORMAL
EOSINOPHIL # BLD AUTO: 0.2 10E9/L (ref 0–0.7)
EOSINOPHIL NFR BLD AUTO: 1.9 %
ERYTHROCYTE [DISTWIDTH] IN BLOOD BY AUTOMATED COUNT: 13.8 % (ref 10–15)
GFR SERPL CREATININE-BSD FRML MDRD: ABNORMAL ML/MIN/1.7M2
GLUCOSE SERPL-MCNC: 98 MG/DL (ref 70–99)
GLUCOSE UR STRIP-MCNC: NEGATIVE MG/DL
HCT VFR BLD AUTO: 37.8 % (ref 35–47)
HGB BLD-MCNC: 13.1 G/DL (ref 11.7–15.7)
HGB UR QL STRIP: NEGATIVE
IMM GRANULOCYTES # BLD: 0 10E9/L (ref 0–0.4)
IMM GRANULOCYTES NFR BLD: 0.2 %
KETONES UR STRIP-MCNC: NEGATIVE MG/DL
LEUKOCYTE ESTERASE UR QL STRIP: NEGATIVE
LIPASE SERPL-CCNC: 160 U/L (ref 73–393)
LYMPHOCYTES # BLD AUTO: 4.4 10E9/L (ref 0.8–5.3)
LYMPHOCYTES NFR BLD AUTO: 49.9 %
MCH RBC QN AUTO: 30.6 PG (ref 26.5–33)
MCHC RBC AUTO-ENTMCNC: 34.7 G/DL (ref 31.5–36.5)
MCV RBC AUTO: 88 FL (ref 78–100)
MONOCYTES # BLD AUTO: 0.6 10E9/L (ref 0–1.3)
MONOCYTES NFR BLD AUTO: 6.9 %
NEUTROPHILS # BLD AUTO: 3.6 10E9/L (ref 1.6–8.3)
NEUTROPHILS NFR BLD AUTO: 40.6 %
NITRATE UR QL: NEGATIVE
NRBC # BLD AUTO: 0 10*3/UL
NRBC BLD AUTO-RTO: 0 /100
PH UR STRIP: 7 PH (ref 5–7)
PLATELET # BLD AUTO: 321 10E9/L (ref 150–450)
POTASSIUM SERPL-SCNC: 3.8 MMOL/L (ref 3.4–5.3)
PROT SERPL-MCNC: 7 G/DL (ref 6.8–8.8)
RBC # BLD AUTO: 4.28 10E12/L (ref 3.8–5.2)
RBC #/AREA URNS AUTO: 1 /HPF (ref 0–2)
SODIUM SERPL-SCNC: 139 MMOL/L (ref 133–144)
SP GR UR STRIP: 1.01 (ref 1–1.03)
URN SPEC COLLECT METH UR: ABNORMAL
UROBILINOGEN UR STRIP-MCNC: NORMAL MG/DL (ref 0–2)
WBC # BLD AUTO: 8.8 10E9/L (ref 4–11)
WBC #/AREA URNS AUTO: 1 /HPF (ref 0–2)

## 2017-06-07 PROCEDURE — 96361 HYDRATE IV INFUSION ADD-ON: CPT

## 2017-06-07 PROCEDURE — 80053 COMPREHEN METABOLIC PANEL: CPT | Performed by: EMERGENCY MEDICINE

## 2017-06-07 PROCEDURE — 99285 EMERGENCY DEPT VISIT HI MDM: CPT | Mod: 25

## 2017-06-07 PROCEDURE — 93976 VASCULAR STUDY: CPT

## 2017-06-07 PROCEDURE — 83690 ASSAY OF LIPASE: CPT | Performed by: EMERGENCY MEDICINE

## 2017-06-07 PROCEDURE — 96375 TX/PRO/DX INJ NEW DRUG ADDON: CPT

## 2017-06-07 PROCEDURE — 25000128 H RX IP 250 OP 636: Performed by: EMERGENCY MEDICINE

## 2017-06-07 PROCEDURE — 85025 COMPLETE CBC W/AUTO DIFF WBC: CPT | Performed by: EMERGENCY MEDICINE

## 2017-06-07 PROCEDURE — 74020 XR ABDOMEN 2 VW: CPT

## 2017-06-07 PROCEDURE — 96374 THER/PROPH/DIAG INJ IV PUSH: CPT

## 2017-06-07 PROCEDURE — 81001 URINALYSIS AUTO W/SCOPE: CPT | Performed by: EMERGENCY MEDICINE

## 2017-06-07 RX ORDER — SODIUM CHLORIDE 9 MG/ML
1000 INJECTION, SOLUTION INTRAVENOUS CONTINUOUS
Status: DISCONTINUED | OUTPATIENT
Start: 2017-06-07 | End: 2017-06-08 | Stop reason: HOSPADM

## 2017-06-07 RX ORDER — HYDROMORPHONE HYDROCHLORIDE 1 MG/ML
0.5 INJECTION, SOLUTION INTRAMUSCULAR; INTRAVENOUS; SUBCUTANEOUS ONCE
Status: COMPLETED | OUTPATIENT
Start: 2017-06-07 | End: 2017-06-07

## 2017-06-07 RX ORDER — ONDANSETRON 2 MG/ML
4 INJECTION INTRAMUSCULAR; INTRAVENOUS
Status: COMPLETED | OUTPATIENT
Start: 2017-06-07 | End: 2017-06-07

## 2017-06-07 RX ORDER — MORPHINE SULFATE 4 MG/ML
4 INJECTION, SOLUTION INTRAMUSCULAR; INTRAVENOUS
Status: COMPLETED | OUTPATIENT
Start: 2017-06-07 | End: 2017-06-07

## 2017-06-07 RX ADMIN — SODIUM CHLORIDE 1000 ML: 9 INJECTION, SOLUTION INTRAVENOUS at 22:46

## 2017-06-07 RX ADMIN — ONDANSETRON 4 MG: 2 SOLUTION INTRAMUSCULAR; INTRAVENOUS at 22:38

## 2017-06-07 RX ADMIN — MORPHINE SULFATE 4 MG: 4 INJECTION, SOLUTION INTRAMUSCULAR; INTRAVENOUS at 22:38

## 2017-06-07 RX ADMIN — HYDROMORPHONE HYDROCHLORIDE 0.5 MG: 1 INJECTION, SOLUTION INTRAMUSCULAR; INTRAVENOUS; SUBCUTANEOUS at 23:13

## 2017-06-07 ASSESSMENT — ENCOUNTER SYMPTOMS
DIARRHEA: 1
DYSURIA: 0
HEMATURIA: 0
BACK PAIN: 1
VOMITING: 1
ABDOMINAL PAIN: 1

## 2017-06-07 NOTE — ED AVS SNAPSHOT
Emergency Department    [address] [facility] [address]    Phone:  [phone]    Fax:  [phone]                                       Julia Smith   MRN: [id number]    Department:   Emergency Department   Date of Visit:  6/7/2017           After Visit Summary Signature Page     I have received my discharge instructions, and my questions have been answered. I have discussed any challenges I see with this plan with the nurse or doctor.    ..........................................................................................................................................  Patient/Patient Representative Signature      ..........................................................................................................................................  Patient Representative Print Name and Relationship to Patient    ..................................................               ................................................  Date                                            Time    ..........................................................................................................................................  Reviewed by Signature/Title    ...................................................              ..............................................  Date                                                            Time

## 2017-06-07 NOTE — ED AVS SNAPSHOT
Emergency Department    [address] [facility] [address]    Phone:  [phone]    Fax:  [phone]                                       Julia Smith   MRN: [id number]    Department:   Emergency Department   Date of Visit:  6/7/2017           Patient Information     Date Of Birth          1985        Your diagnoses for this visit were:     Abdominal pain, right lower quadrant        You were seen by Gordon Roche MD.      Follow-up Information     Follow up with Justin Wells MD. Schedule an appointment as soon as possible for a visit in 2 days.    Specialty:  OB/Gyn    Contact information:    [facility]  [address]  [facility] [id number] [phone]          Follow up with  Emergency Department.    Specialty:  EMERGENCY MEDICINE    Why:  If symptoms worsen    Contact information:    [address] [facility] [id number] [phone]        Discharge Instructions       Please follow-up with your OB/GYN to discuss today's visit.    Discharge Instructions  Abdominal Pain    Abdominal pain can be caused by many things. Your evaluation today does not show the exact cause for your pain. Your doctor today has decided that it is unlikely your pain is due to a life threatening problem, or a problem requiring surgery or hospital admission. Sometimes those problems cannot be found right away, so it is very important that you follow up as directed.  Sometimes only the changes which occur over time allow the cause of your pain to be found.    Return to the Emergency Department for a recheck in 8-12 hours if your pain continues.  If your pain gets worse, changes in location, or feels different, return to the Emergency Department right away.    ADULTS:  Return to the Emergency Department right away if:      You get an oral temperature above 102oF or as directed by your doctor.    You have blood in your stools (bright red or black, tarry  stools).    You keep throwing up or can t drink liquids.    You see blood when you throw up.    You can t have a bowel movement or you can t pass gas.    Your stomach gets bloated or bigger.    Your skin or the whites of your eyes look yellow.    You faint.    You have bloody, frequent or painful urination.    You have new symptoms or anything that worries you.    CHILDREN:  Return to the Emergency Department right away if your child has any of the above-listed symptoms or the following:      Pushes your hand away or screams/cries when his/her belly is touched.    You notice your child is very fussy or weak.    Your child is very tired and is too tired to eat or drink.    Your child is dehydrated.  Signs of dehydration can be:  o Your infant has had no wet diapers in 4-5 hours.  o Your older child has not passed urine in 6-8 hours.  o Your infant or child starts to have dry mouth and lips, or no saliva or tears.    PREGNANT WOMEN:  Return to the Emergency Department right away if you have any of the above-listed symptoms or the following:      You have bleeding, leaking fluid or passing tissue from the vagina.    You have worse pain or cramping, or pain in your shoulder or back.    You have vomiting that will not stop.    You have painful or bloody urination.    You have a temperature of 100oF or more.    Your baby is not moving as much as usual.    You faint.    You get a bad headache with or without eye problems and abdominal pain.    You have a convulsion or seizure.    You have unusual discharge from your vagina and abdominal pain.    Abdominal pain is pretty common during pregnancy.  Your pain may or may not be related to your pregnancy. You should follow-up closely with your OB doctor so they can evaluate you and your baby.  Until you follow-up with your regular doctor, do the following:       Avoid sex and do not put anything in your vagina.    Drink clear fluids.    Only take medications approved by your  "doctor.    MORE INFORMATION:    Appendicitis:  A possible cause of abdominal pain in any person who still has their appendix is acute appendicitis. Appendicitis is often hard to diagnose.  Testing does not always rule out early appendicitis or other causes of abdominal pain. Close follow-up with your doctor and re-evaluations may be needed to figure out the reason for your abdominal pain.    Follow-up:  It is very important that you make an appointment with your clinic and go to the appointment.  If you do not follow-up with your primary doctor, it may result in missing an important development which could result in permanent injury or disability and/or lasting pain.  If there is any problem keeping your appointment, call your doctor or return to the Emergency Department.    Medications:  Take your medications as directed by your doctor today.  Before using over-the-counter medications, ask your doctor and make sure to take the medications as directed.  If you have any questions about medications, ask your doctor.    Diet:  Resume your normal diet as much as possible, but do not eat fried, fatty or spicy foods while you have pain.  Do not drink alcohol or have caffeine.  Do not smoke tobacco.    Probiotics: If you have been given an antibiotic, you may want to also take a probiotic pill or eat yogurt with live cultures. Probiotics have \"good bacteria\" to help your intestines stay healthy. Studies have shown that probiotics help prevent diarrhea and other intestine problems (including C. diff infection) when you take antibiotics. You can buy these without a prescription in the pharmacy section of the store.     If you were given a prescription for medicine here today, be sure to read all of the information (including the package insert) that comes with your prescription.  This will include important information about the medicine, its side effects, and any warnings that you need to know about.  The pharmacist who " fills the prescription can provide more information and answer questions you may have about the medicine.  If you have questions or concerns that the pharmacist cannot address, please call or return to the Emergency Department.         Opioid Medication Information    Pain medications are among the most commonly prescribed medicines, so we are including this information for all our patients. If you did not receive pain medication or get a prescription for pain medicine, you can ignore it.     You may have been given a prescription for an opioid (narcotic) pain medicine and/or have received a pain medicine while here in the Emergency Department. These medicines can make you drowsy or impaired. You must not drive, operate dangerous equipment, or engage in any other dangerous activities while taking these medications. If you drive while taking these medications, you could be arrested for DUI, or driving under the influence. Do not drink any alcohol while you are taking these medications.     Opioid pain medications can cause addiction. If you have a history of chemical dependency of any type, you are at a higher risk of becoming addicted to pain medications.  Only take these prescribed medications to treat your pain when all other options have been tried. Take it for as short a time and as few doses as possible. Store your pain pills in a secure place, as they are frequently stolen and provide a dangerous opportunity for children or visitors in your house to start abusing these powerful medications. We will not replace any lost or stolen medicine.  As soon as your pain is better, you should flush all your remaining medication.     Many prescription pain medications contain Tylenol  (acetaminophen), including Vicodin , Tylenol #3 , Norco , Lortab , and Percocet .  You should not take any extra pills of Tylenol  if you are using these prescription medications or you can get very sick.  Do not ever take more than 3000 mg  of acetaminophen in any 24 hour period.    All opioids tend to cause constipation. Drink plenty of water and eat foods that have a lot of fiber, such as fruits, vegetables, prune juice, apple juice and high fiber cereal.  Take a laxative if you don t move your bowels at least every other day. Miralax , Milk of Magnesia, Colace , or Senna  can be used to keep you regular.      Remember that you can always come back to the Emergency Department if you are not able to see your regular doctor in the amount of time listed above, if you get any new symptoms, or if there is anything that worries you.          24 Hour Appointment Hotline       To make an appointment at any [facility], call [phone] ([phone]). If you don't have a family doctor or clinic, we will help you find one. [facility] clinics are conveniently located to serve the needs of you and your family.             Review of your medicines      Our records show that you are taking the medicines listed below. If these are incorrect, please call your family doctor or clinic.        Dose / Directions Last dose taken    ADDERALL PO        Refills:  0        benzonatate 100 MG capsule   Commonly known as:  TESSALON   Dose:  100 mg   Quantity:  30 capsule        Take 1 capsule (100 mg) by mouth 3 times daily as needed   Refills:  0        fluconazole 150 MG tablet   Commonly known as:  DIFLUCAN   Dose:  150 mg   Quantity:  4 tablet        Take 1 tablet (150 mg) by mouth every 3 days   Refills:  0        lamoTRIgine 150 MG tablet   Commonly known as:  LaMICtal        Refills:  1        LYRICA 50 MG capsule   Generic drug:  pregabalin        TK 1 C PO TID.   Refills:  0        ORTHO TRI-CYCLEN LO 0.18/0.215/0.25 MG-25 MCG per tablet   Dose:  1 tablet   Generic drug:  norgestim-eth estrad triphasic        Take 1 tablet by mouth daily   Refills:  0        oxyCODONE-acetaminophen 5-325 MG per tablet   Commonly known as:  PERCOCET   Dose:  1-2 tablet    Quantity:  20 tablet        Take 1-2 tablets by mouth every 4 hours as needed for moderate to severe pain   Refills:  0        penicillin G benzathine 9078770 UNIT/2ML injection   Commonly known as:  BICILLIN L-A   Quantity:  2 mL        1.2 million units injected IM x 1   Refills:  0        sertraline 100 MG tablet   Commonly known as:  ZOLOFT   Dose:  100 mg   Quantity:  30 tablet        Take 1 tablet (100 mg) by mouth At Bedtime   Refills:  0        VITAMIN D (CHOLECALCIFEROL) PO   Dose:  4000 Units        Take 4,000 Units by mouth daily   Refills:  0                Procedures and tests performed during your visit     Abdomen XR, 2 vw, flat and upright    CBC with platelets differential    Comprehensive metabolic panel    Lipase    UA with Microscopic    US Pelvic Complete w Transvaginal & Abd/Pel Duplex Limited      Orders Needing Specimen Collection     None      Pending Results     No orders found for last 3 day(s).            Pending Culture Results     No orders found for last 3 day(s).            Pending Results Instructions     If you had any lab results that were not finalized at the time of your Discharge, you can call the ED Lab Result RN at [phone]. You will be contacted by this team for any positive Lab results or changes in treatment. The nurses are available 7 days a week from 10A to 6:30P.  You can leave a message 24 hours per day and they will return your call.        Test Results From Your Hospital Stay        6/7/2017 10:58 PM      Component Results     Component Value Ref Range & Units Status    WBC 8.8 4.0 - 11.0 10e9/L Final    RBC Count 4.28 3.8 - 5.2 10e12/L Final    Hemoglobin 13.1 11.7 - 15.7 g/dL Final    Hematocrit 37.8 35.0 - 47.0 % Final    MCV 88 78 - 100 fl Final    MCH 30.6 26.5 - 33.0 pg Final    MCHC 34.7 31.5 - 36.5 g/dL Final    RDW 13.8 10.0 - 15.0 % Final    Platelet Count 321 150 - 450 10e9/L Final    Diff Method Automated Method  Final    % Neutrophils 40.6 % Final     % Lymphocytes 49.9 % Final    % Monocytes 6.9 % Final    % Eosinophils 1.9 % Final    % Basophils 0.5 % Final    % Immature Granulocytes 0.2 % Final    Nucleated RBCs 0 0 /100 Final    Absolute Neutrophil 3.6 1.6 - 8.3 10e9/L Final    Absolute Lymphocytes 4.4 0.8 - 5.3 10e9/L Final    Absolute Monocytes 0.6 0.0 - 1.3 10e9/L Final    Absolute Eosinophils 0.2 0.0 - 0.7 10e9/L Final    Absolute Basophils 0.0 0.0 - 0.2 10e9/L Final    Abs Immature Granulocytes 0.0 0 - 0.4 10e9/L Final    Absolute Nucleated RBC 0.0  Final         6/7/2017 11:15 PM      Component Results     Component Value Ref Range & Units Status    Sodium 139 133 - 144 mmol/L Final    Potassium 3.8 3.4 - 5.3 mmol/L Final    Chloride 105 94 - 109 mmol/L Final    Carbon Dioxide 27 20 - 32 mmol/L Final    Anion Gap 7 3 - 14 mmol/L Final    Glucose 98 70 - 99 mg/dL Final    Urea Nitrogen 6 (L) 7 - 30 mg/dL Final    Creatinine 0.61 0.52 - 1.04 mg/dL Final    GFR Estimate >90  Non  GFR Calc   >60 mL/min/1.7m2 Final    GFR Estimate If Black >90   GFR Calc   >60 mL/min/1.7m2 Final    Calcium 8.8 8.5 - 10.1 mg/dL Final    Bilirubin Total 0.1 (L) 0.2 - 1.3 mg/dL Final    Albumin 3.5 3.4 - 5.0 g/dL Final    Protein Total 7.0 6.8 - 8.8 g/dL Final    Alkaline Phosphatase 63 40 - 150 U/L Final    ALT 14 0 - 50 U/L Final    AST 9 0 - 45 U/L Final         6/7/2017 11:13 PM      Component Results     Component Value Ref Range & Units Status    Lipase 160 73 - 393 U/L Final         6/7/2017 11:17 PM      Component Results     Component Value Ref Range & Units Status    Color Urine Yellow  Final    Appearance Urine Clear  Final    Glucose Urine Negative NEG mg/dL Final    Bilirubin Urine Negative NEG Final    Ketones Urine Negative NEG mg/dL Final    Specific Gravity Urine 1.008 1.003 - 1.035 Final    Blood Urine Negative NEG Final    pH Urine 7.0 5.0 - 7.0 pH Final    Protein Albumin Urine Negative NEG mg/dL Final    Urobilinogen mg/dL  Normal 0.0 - 2.0 mg/dL Final    Nitrite Urine Negative NEG Final    Leukocyte Esterase Urine Negative NEG Final    Source Midstream Urine  Final    WBC Urine 1 0 - 2 /HPF Final    RBC Urine 1 0 - 2 /HPF Final    Amorphous Crystals Few (A) NEG /HPF Final         6/8/2017 12:01 AM      Narrative     ABDOMEN 2 VIEWS  6/7/2017 11:02 PM     HISTORY: Lower abdominal pain.    COMPARISON: None.    FINDINGS: Gas is present within nondilated small and large bowel. A  small amount of stool within the colon. No bowel wall thickening,  pneumatosis or free intraperitoneal gas.        Impression     IMPRESSION:  1. No evidence of bowel obstruction.  2. A small amount of stool within the colon.    ROSA LIVINGSTON MD         6/8/2017 12:02 AM      Narrative     ULTRASOUND PELVIS WITH TRANSVAGINAL IMAGING AND DOPPLER  6/7/2017  11:36 PM     HISTORY: Right lower quadrant pelvic pain.    COMPARISON: 5/15/2017.    TECHNIQUE: Endovaginal imaging was also performed to better evaluate  the ovaries. Spectral waveform and color Doppler evaluation were  performed of the ovaries.    FINDINGS: The uterus is not visualized. Per report, there has been  prior hysterectomy. The right ovary measures 2.7 x 2.6 x 1.9 cm.  Numerous tiny follicles are present throughout the right ovary. Blood  flow is visualized in the right ovary. The left ovary is not  visualized. No adnexal masses. No free fluid in the pelvis.        Impression     IMPRESSION:  1. Unremarkable appearance of the right ovary. Blood flow is  visualized within the right ovary.  2. The left ovary is not visualized.  3. Prior hysterectomy.  4. No free fluid in the pelvis.     ROSA LIVINGSTON MD                Clinical Quality Measure: Blood Pressure Screening     Your blood pressure was checked while you were in the emergency department today. The last reading we obtained was  BP: 101/69 . Please read the guidelines below about what these numbers mean and what you should do about them.  If  your systolic blood pressure (the top number) is less than 120 and your diastolic blood pressure (the bottom number) is less than 80, then your blood pressure is normal. There is nothing more that you need to do about it.  If your systolic blood pressure (the top number) is 120-139 or your diastolic blood pressure (the bottom number) is 80-89, your blood pressure may be higher than it should be. You should have your blood pressure rechecked within a year by a primary care provider.  If your systolic blood pressure (the top number) is 140 or greater or your diastolic blood pressure (the bottom number) is 90 or greater, you may have high blood pressure. High blood pressure is treatable, but if left untreated over time it can put you at risk for heart attack, stroke, or kidney failure. You should have your blood pressure rechecked by a primary care provider within the next 4 weeks.  If your provider in the emergency department today gave you specific instructions to follow-up with your doctor or provider even sooner than that, you should follow that instruction and not wait for up to 4 weeks for your follow-up visit.        Thank you for choosing [facility]       Thank you for choosing [facility] for your care. Our goal is always to provide you with excellent care. Hearing back from our patients is one way we can continue to improve our services. Please take a few minutes to complete the written survey that you may receive in the mail after you visit with us. Thank you!        "[employer]hart Information     [employer] gives you secure access to your electronic health record. If you see a primary care provider, you can also send messages to your care team and make appointments. If you have questions, please call your primary care clinic.  If you do not have a primary care provider, please call [phone] and they will assist you.        Care EveryWhere ID     This is your Care EveryWhere ID. This could be used by other  organizations to access your [facility] medical records  [id number]        After Visit Summary       This is your record. Keep this with you and show to your community pharmacist(s) and doctor(s) at your next visit.

## 2017-06-08 VITALS
BODY MASS INDEX: 24.75 KG/M2 | HEIGHT: 64 IN | WEIGHT: 145 LBS | TEMPERATURE: 98.8 F | OXYGEN SATURATION: 96 % | RESPIRATION RATE: 16 BRPM | SYSTOLIC BLOOD PRESSURE: 96 MMHG | DIASTOLIC BLOOD PRESSURE: 74 MMHG

## 2017-06-08 NOTE — DISCHARGE INSTRUCTIONS
Please follow-up with your OB/GYN to discuss today's visit.    Discharge Instructions  Abdominal Pain    Abdominal pain can be caused by many things. Your evaluation today does not show the exact cause for your pain. Your doctor today has decided that it is unlikely your pain is due to a life threatening problem, or a problem requiring surgery or hospital admission. Sometimes those problems cannot be found right away, so it is very important that you follow up as directed.  Sometimes only the changes which occur over time allow the cause of your pain to be found.    Return to the Emergency Department for a recheck in 8-12 hours if your pain continues.  If your pain gets worse, changes in location, or feels different, return to the Emergency Department right away.    ADULTS:  Return to the Emergency Department right away if:      You get an oral temperature above 102oF or as directed by your doctor.    You have blood in your stools (bright red or black, tarry stools).    You keep throwing up or can t drink liquids.    You see blood when you throw up.    You can t have a bowel movement or you can t pass gas.    Your stomach gets bloated or bigger.    Your skin or the whites of your eyes look yellow.    You faint.    You have bloody, frequent or painful urination.    You have new symptoms or anything that worries you.    CHILDREN:  Return to the Emergency Department right away if your child has any of the above-listed symptoms or the following:      Pushes your hand away or screams/cries when his/her belly is touched.    You notice your child is very fussy or weak.    Your child is very tired and is too tired to eat or drink.    Your child is dehydrated.  Signs of dehydration can be:  o Your infant has had no wet diapers in 4-5 hours.  o Your older child has not passed urine in 6-8 hours.  o Your infant or child starts to have dry mouth and lips, or no saliva or tears.    PREGNANT WOMEN:  Return to the Emergency  Department right away if you have any of the above-listed symptoms or the following:      You have bleeding, leaking fluid or passing tissue from the vagina.    You have worse pain or cramping, or pain in your shoulder or back.    You have vomiting that will not stop.    You have painful or bloody urination.    You have a temperature of 100oF or more.    Your baby is not moving as much as usual.    You faint.    You get a bad headache with or without eye problems and abdominal pain.    You have a convulsion or seizure.    You have unusual discharge from your vagina and abdominal pain.    Abdominal pain is pretty common during pregnancy.  Your pain may or may not be related to your pregnancy. You should follow-up closely with your OB doctor so they can evaluate you and your baby.  Until you follow-up with your regular doctor, do the following:       Avoid sex and do not put anything in your vagina.    Drink clear fluids.    Only take medications approved by your doctor.    MORE INFORMATION:    Appendicitis:  A possible cause of abdominal pain in any person who still has their appendix is acute appendicitis. Appendicitis is often hard to diagnose.  Testing does not always rule out early appendicitis or other causes of abdominal pain. Close follow-up with your doctor and re-evaluations may be needed to figure out the reason for your abdominal pain.    Follow-up:  It is very important that you make an appointment with your clinic and go to the appointment.  If you do not follow-up with your primary doctor, it may result in missing an important development which could result in permanent injury or disability and/or lasting pain.  If there is any problem keeping your appointment, call your doctor or return to the Emergency Department.    Medications:  Take your medications as directed by your doctor today.  Before using over-the-counter medications, ask your doctor and make sure to take the medications as directed.  If  "you have any questions about medications, ask your doctor.    Diet:  Resume your normal diet as much as possible, but do not eat fried, fatty or spicy foods while you have pain.  Do not drink alcohol or have caffeine.  Do not smoke tobacco.    Probiotics: If you have been given an antibiotic, you may want to also take a probiotic pill or eat yogurt with live cultures. Probiotics have \"good bacteria\" to help your intestines stay healthy. Studies have shown that probiotics help prevent diarrhea and other intestine problems (including C. diff infection) when you take antibiotics. You can buy these without a prescription in the pharmacy section of the store.     If you were given a prescription for medicine here today, be sure to read all of the information (including the package insert) that comes with your prescription.  This will include important information about the medicine, its side effects, and any warnings that you need to know about.  The pharmacist who fills the prescription can provide more information and answer questions you may have about the medicine.  If you have questions or concerns that the pharmacist cannot address, please call or return to the Emergency Department.         Opioid Medication Information    Pain medications are among the most commonly prescribed medicines, so we are including this information for all our patients. If you did not receive pain medication or get a prescription for pain medicine, you can ignore it.     You may have been given a prescription for an opioid (narcotic) pain medicine and/or have received a pain medicine while here in the Emergency Department. These medicines can make you drowsy or impaired. You must not drive, operate dangerous equipment, or engage in any other dangerous activities while taking these medications. If you drive while taking these medications, you could be arrested for DUI, or driving under the influence. Do not drink any alcohol while you are " taking these medications.     Opioid pain medications can cause addiction. If you have a history of chemical dependency of any type, you are at a higher risk of becoming addicted to pain medications.  Only take these prescribed medications to treat your pain when all other options have been tried. Take it for as short a time and as few doses as possible. Store your pain pills in a secure place, as they are frequently stolen and provide a dangerous opportunity for children or visitors in your house to start abusing these powerful medications. We will not replace any lost or stolen medicine.  As soon as your pain is better, you should flush all your remaining medication.     Many prescription pain medications contain Tylenol  (acetaminophen), including Vicodin , Tylenol #3 , Norco , Lortab , and Percocet .  You should not take any extra pills of Tylenol  if you are using these prescription medications or you can get very sick.  Do not ever take more than 3000 mg of acetaminophen in any 24 hour period.    All opioids tend to cause constipation. Drink plenty of water and eat foods that have a lot of fiber, such as fruits, vegetables, prune juice, apple juice and high fiber cereal.  Take a laxative if you don t move your bowels at least every other day. Miralax , Milk of Magnesia, Colace , or Senna  can be used to keep you regular.      Remember that you can always come back to the Emergency Department if you are not able to see your regular doctor in the amount of time listed above, if you get any new symptoms, or if there is anything that worries you.

## 2017-06-08 NOTE — ED PROVIDER NOTES
"  History     Chief Complaint:  Abdominal Pain      HPI   Julia Smith is a 32 year old female with a past medical history of significant for hysterectomy, left oophorectomy, bilateral salpingectomy, appendectomy, who presents for evaluation of abdominal pain. The patient reports she woke up with RLQ abdominal pain at 0400 this morning and it has been ongoing since. She states she had one episode of vomiting and after that, the pain started to radiate into her right back and down her right leg. The patient did take aleve and tylenol for pain control with no relief to her pain. She states that lifting her legs towards her abdomen mildly alleviates the pain. The patient reports she has been having diarrhea, but believes this could be because \"part of colon is stuck to my pelvic wall.\" She states that she did call her OB/GYN clinic, but could not get a hold of her doctor and so presents to the ED for further evaluation. She notes she had similar pain in the past which was never diagnosed. The patient is  and reports regular sexual activity, but denies any history of STD or concern for STD. She also denies hematuria, dysuria or any urinary symptoms. The patient visited the ED on 5/15 for lower abdominal pain and the ultrasound done at that time showed ovarian follicle.       Allergies:  Decadron  Depakote  Ibuprofen  Tramadol  Valproic acid    Medications:    Vitamin D  Zoloft  Lamictal  Tessalon  Bicillin  Diflucan  Adderall  Othro-tri-cyclen  Percocet     Past Medical History:    Bipolar II disorder  Endometriosis  Fibromyalgia  GERD  Migraine  Other chronic pain  Panic attacks  PCOS  TMJ  Hyperlipidemia    Past Surgical History:    Appendectomy  Dilation and curettage x2  left-oophorectomy  Salpingectomy  Colonoscopy  Cystoscopy x2  Assisted ablation excision of endometriosis  Hysterectomy  Pelvic procedure x2    Family History:    Mother: Depression    Social History:  Marital Status:     Smoking " "status: Current every day smoker  Alcohol use: Rarely      Review of Systems   Gastrointestinal: Positive for abdominal pain, diarrhea and vomiting.   Genitourinary: Negative for dysuria and hematuria.   Musculoskeletal: Positive for back pain.   All other systems reviewed and are negative.    Physical Exam   First Vitals:  BP: 116/72  Heart Rate: 104  Temp: 98.8  F (37.1  C)  Height: 162.6 cm (5' 4\")  Weight: 65.8 kg (145 lb)  SpO2: 96 %    Physical Exam  General:                        Well-nourished                        Speaking in full sentences  Eyes:                        Conjunctiva without injection or scleral icterus                        PERRL  ENT:                        Moist mucous membranes                        Posterior oropharynx clear without erythema or exudate                        Nares patent                        Pinnae normal  Neck:                        Full ROM                        No stiffness appreciated  Resp:                        Lungs CTAB                        No crackles, wheezing or audible rubs                        Good air movement  CV:                                        Normal rate, regular rhythm                        S1 and S2 present                        No murmur, gallop or rub  GI:                        BS present                        Abdomen soft without distention                        Tenderness to palpation to RLQ             Remainder of abdomen is without localizing tenderness, no CVA tenderness                        No guarding or rebound tenderness  Skin:                        Warm, dry, well perfused                        No rashes or open wounds on exposed skin  MSK:                        Moves all extremities                        No focal deformities or swelling  Neuro:                        Alert                        Answers questions appropriately                        Moves all extremities equally                        Gait " stable  Psych:                        Normal affect, normal mood    Emergency Department Course     Imaging:  Radiographic findings were communicated with the patient who voiced understanding of the findings.    US Pelvic Complete w Transvaginal & Abd/Pel Duplex Limited  Final Result  IMPRESSION:  1. Unremarkable appearance of the right ovary. Blood flow is  visualized within the right ovary.  2. The left ovary is not visualized.  3. Prior hysterectomy.  4. No free fluid in the pelvis.     ROSA LIVINGSTON MD    Abdomen XR, 2 vw, flat and upright  Final Result  IMPRESSION:  1. No evidence of bowel obstruction.  2. A small amount of stool within the colon.    ROSA LIVINGSTON MD    Laboratory:  CBC: WBC 8.8 (WNL) HGB 13.1 (WNL)  (WNL)   CMP: Cr 0.61 (WNL) Glucose 98 (WNL) BUN 6 (L) Bilirubin 0.1 (L)  Rest WNL  Lipase: 160 (WNL)  UA: Clear, yellow urine; Amorphous crystals Few (A) Rest WNL    Interventions:  2238: Morphine, 4 mg, IV  2238: Zofran, 4 mg, IV  2246: Normal saline, 1000 ml, IV  2313: Dilaudid, 0.5 mg, IV    ED Course:  Nursing notes and past medical history reviewed.   I performed a physical examination of the patient as documented above.  I explained the plan with the patient who consents to this.   The above workups were undertaken.   0017: The patient was updated.  I personally reviewed the laboratory and imaging results with the Patient and answered all related questions prior to discharge.   Findings and plan explained to the Patient. Patient discharged home with instructions regarding supportive care, medications, and reasons to return. The importance of close follow-up was reviewed.     Impression & Plan      Medical Decision Making:  Julia Smith is a 32 year old female with a complex past medical history significant for abdominal pain status post hysterectomy, bilateral salpingectomy, left oophorectomy, appendectomy, and endometriosis who presents to the ED for evaluation of RLQ  abdominal pain. Vitals signs on presentation are within normal limits aside from elevated heart rate. Prior workup and visit to this ED were reviewed including a visit on 5/15 for which she underwent pelvic ultrasound and XR. At that time, she was noted to have multiple follicles to the right ovary. In light of acute worsening symptoms, workup in the ED included pelvic ultrasound and abdominal XR. At present, symptoms suspicious for acute on chronic abdominal pain. She describes one episode of emesis although I do not feel her current exam is consistent with small bowel obstruction. Abdominal XR demonstrates no findings of such. Pelvic ultrasound reveals unremarkable right ovary with visualized blood flow. The patient is status post hysterectomy and left oophorectomy. Laboratory studies including urinalysis are unremarkable. The patient was provided the above analgesia noting improvement in symptoms. On reevaluation I do feel she is safe and stable for discharge home. I have recommended follow up with her primary OB/GYN doctor, Dr. Wells for reevaluation. She is welcome to return to the ER at any point with worsening pain, vomiting, inability to tolerate or any other concerns. All questions answered prior to discharge.     Diagnosis:    ICD-10-CM    1. Abdominal pain, right lower quadrant R10.31          Disposition:   Discharge to home with OB/GYN follow up.       I, Cesario Tony, am serving as a scribe on 6/7/2017 at 10:38 PM to personally document services performed by Gordon Roche MD, based on my observations and the provider's statements to me.         Gordon Roche MD  06/08/17 0355

## 2017-06-08 NOTE — ED NOTES
"Pt c/o right sided abd pain, hx of hysterectomy does have right ovary. Pt to RLQ started couple hours ago. \"pain was sharp, then I felt movement like i was pregnant\" pain radiates down right leg. Vomited x1  "

## 2017-06-21 ENCOUNTER — OFFICE VISIT (OUTPATIENT)
Dept: URGENT CARE | Facility: URGENT CARE | Age: 32
End: 2017-06-21
Payer: COMMERCIAL

## 2017-06-21 VITALS
DIASTOLIC BLOOD PRESSURE: 66 MMHG | SYSTOLIC BLOOD PRESSURE: 90 MMHG | TEMPERATURE: 97.9 F | HEART RATE: 88 BPM | OXYGEN SATURATION: 97 %

## 2017-06-21 DIAGNOSIS — M26.609 TMJ (TEMPOROMANDIBULAR JOINT SYNDROME): Primary | ICD-10-CM

## 2017-06-21 PROCEDURE — 99213 OFFICE O/P EST LOW 20 MIN: CPT | Performed by: INTERNAL MEDICINE

## 2017-06-21 RX ORDER — HYDROCODONE BITARTRATE AND ACETAMINOPHEN 5; 325 MG/1; MG/1
1-2 TABLET ORAL EVERY 4 HOURS PRN
Qty: 20 TABLET | Refills: 0 | Status: SHIPPED | OUTPATIENT
Start: 2017-06-21 | End: 2017-06-28

## 2017-06-21 RX ORDER — CLONAZEPAM 0.5 MG/1
TABLET ORAL
Refills: 3 | Status: ON HOLD | COMMUNITY
Start: 2016-12-19 | End: 2017-07-19

## 2017-06-21 NOTE — MR AVS SNAPSHOT
After Visit Summary   6/21/2017    Julia Smith    MRN: [id number]           Patient Information     Date Of Birth          1985        Visit Information        Provider Department      6/21/2017 7:25 PM Maged Mccoy MD [facility] [place]        Today's Diagnoses     TMJ (temporomandibular joint syndrome)    -  1      Care Instructions    There is no evidence of a jaw dislocation.  The pain is likely due to flaring of TMJ arthritis and muscle spasm.  Will treat with pain relief short term.  Work on gently stretching the jaw with your TMJ exercises.  Check with your dentist.          Follow-ups after your visit        Who to contact     If you have questions or need follow up information about today's clinic visit or your schedule please contact [facility] directly at [phone].  Normal or non-critical lab and imaging results will be communicated to you by Conferhart, letter or phone within 4 business days after the clinic has received the results. If you do not hear from us within 7 days, please contact the clinic through Conferhart or phone. If you have a critical or abnormal lab result, we will notify you by phone as soon as possible.  Submit refill requests through IRI or call your pharmacy and they will forward the refill request to us. Please allow 3 business days for your refill to be completed.          Additional Information About Your Visit        MyChart Information     IRI gives you secure access to your electronic health record. If you see a primary care provider, you can also send messages to your care team and make appointments. If you have questions, please call your primary care clinic.  If you do not have a primary care provider, please call [phone] and they will assist you.        Care EveryWhere ID     This is your Care EveryWhere ID. This could be used by other organizations to access your [facility]  records  [id number]        Your Vitals Were     Pulse Temperature Last Period Pulse Oximetry          88 97.9  F (36.6  C) 01/03/2014 97%         Blood Pressure from Last 3 Encounters:   06/21/17 90/66   06/08/17 96/74   05/25/17 109/75    Weight from Last 3 Encounters:   06/07/17 145 lb (65.8 kg)   05/25/17 147 lb (66.7 kg)   05/15/17 145 lb (65.8 kg)              Today, you had the following     No orders found for display         Today's Medication Changes          These changes are accurate as of: 6/21/17  8:45 PM.  If you have any questions, ask your nurse or doctor.               Start taking these medicines.        Dose/Directions    HYDROcodone-acetaminophen 5-325 MG per tablet   Commonly known as:  NORCO   Used for:  TMJ (temporomandibular joint syndrome)        Dose:  1-2 tablet   Take 1-2 tablets by mouth every 4 hours as needed for moderate to severe pain maximum 4 tablet(s) per day   Quantity:  20 tablet   Refills:  0            Where to get your medicines      Some of these will need a paper prescription and others can be bought over the counter.  Ask your nurse if you have questions.     Bring a paper prescription for each of these medications     HYDROcodone-acetaminophen 5-325 MG per tablet                Primary Care Provider    Physician No Ref-Primary       No address on file        Equal Access to Services     CHICHI HUGHES AH: Hadii ty gee Socollins, waaxda luqadaha, qaybta kaalmada adeegyada, shimon thomas. So [facility] [phone].    ATENCIÓN: Si habla español, tiene a smith disposición servicios gratuitos de asistencia lingüística. Llame al [phone].    We comply with applicable federal civil rights laws and [place] laws. We do not discriminate on the basis of race, color, national origin, age, disability sex, sexual orientation or gender identity.            Thank you!     Thank you for choosing [facility]  for your care. Our  goal is always to provide you with excellent care. Hearing back from our patients is one way we can continue to improve our services. Please take a few minutes to complete the written survey that you may receive in the mail after your visit with us. Thank you!             Your Updated Medication List - Protect others around you: Learn how to safely use, store and throw away your medicines at www.disposemymeds.org.          This list is accurate as of: 6/21/17  8:45 PM.  Always use your most recent med list.                   Brand Name Dispense Instructions for use Diagnosis    ADDERALL PO           benzonatate 100 MG capsule    TESSALON    30 capsule    Take 1 capsule (100 mg) by mouth 3 times daily as needed    Cough       clonazePAM 0.5 MG tablet    klonoPIN     TK 1 T PO  HS.        fluconazole 150 MG tablet    DIFLUCAN    4 tablet    Take 1 tablet (150 mg) by mouth every 3 days    Candidiasis of vagina       HYDROcodone-acetaminophen 5-325 MG per tablet    NORCO    20 tablet    Take 1-2 tablets by mouth every 4 hours as needed for moderate to severe pain maximum 4 tablet(s) per day    TMJ (temporomandibular joint syndrome)       lamoTRIgine 150 MG tablet    LaMICtal          LYRICA 50 MG capsule   Generic drug:  pregabalin      TK 1 C PO TID.        ORTHO TRI-CYCLEN LO 0.18/0.215/0.25 MG-25 MCG per tablet   Generic drug:  norgestim-eth estrad triphasic      Take 1 tablet by mouth daily        oxyCODONE-acetaminophen 5-325 MG per tablet    PERCOCET    20 tablet    Take 1-2 tablets by mouth every 4 hours as needed for moderate to severe pain        penicillin G benzathine [id number] UNIT/2ML injection    BICILLIN L-A    2 mL    1.2 million units injected IM x 1    Acute recurrent streptococcal tonsillitis       sertraline 100 MG tablet    ZOLOFT    30 tablet    Take 1 tablet (100 mg) by mouth At Bedtime    REINA (generalized anxiety disorder)       VITAMIN D (CHOLECALCIFEROL) PO      Take 4,000 Units by mouth daily

## 2017-06-22 NOTE — PATIENT INSTRUCTIONS
There is no evidence of a jaw dislocation.  The pain is likely due to flaring of TMJ arthritis and muscle spasm.  Will treat with pain relief short term.  Work on gently stretching the jaw with your TMJ exercises.  Check with your dentist.

## 2017-06-22 NOTE — NURSING NOTE
"Chief Complaint   Patient presents with     Dental Pain     had 5 cavities filled 6 days ago,having difficulty opening jaw on lt side       Initial BP 90/66 (BP Location: Left arm, Patient Position: Chair, Cuff Size: Adult Regular)  Pulse 88  Temp 97.9  F (36.6  C)  LMP 01/03/2014  SpO2 97% Estimated body mass index is 24.89 kg/(m^2) as calculated from the following:    Height as of 6/7/17: 5' 4\" (1.626 m).    Weight as of 6/7/17: 145 lb (65.8 kg).  Medication Reconciliation: complete   Bonnie BERNSTEIN    "

## 2017-06-22 NOTE — PROGRESS NOTES
SUBJECTIVE:  Julia Smith, a 32 year old female, presents for evaluation of jaw pain.  She had some dental work done 5 days ago; had 5 fillings on the left side.  She states that she had pretty deep local anesthesia on that side.  She notes that she now is unable to open her jaw without pain on the left side.  Waking with a throbbing pain.  She has pre-existing TMJ.  She can't take ibuprofen or other NSAIDs (dur to vomiting and GI side effects).      OBJECTIVE:  BP 90/66 (BP Location: Left arm, Patient Position: Chair, Cuff Size: Adult Regular)  Pulse 88  Temp 97.9  F (36.6  C)  LMP 01/03/2014  SpO2 97%  GENERAL: healthy, alert and no distress  HENT: bilateral TMJ condyles are palpable in appropriate position; tender at the left TMJ; patient only tolerates jaw opening to 2 cm or so.  Speech is normal; jaw closure is normal; some tenderness in the temporalis muscle; no heat/warmth or erythema    ASSESSMENT/PLAN:    ICD-10-CM    1. TMJ (temporomandibular joint syndrome) M26.609 HYDROcodone-acetaminophen (NORCO) 5-325 MG per tablet    Patient Instructions   There is no evidence of a jaw dislocation.  The pain is likely due to flaring of TMJ arthritis and muscle spasm.  Will treat with pain relief short term.  Work on gently stretching the jaw with your TMJ exercises.  Check with your dentist.         Maged Mccoy MD

## 2017-06-28 ENCOUNTER — OFFICE VISIT (OUTPATIENT)
Dept: FAMILY MEDICINE | Facility: CLINIC | Age: 32
End: 2017-06-28
Payer: COMMERCIAL

## 2017-06-28 VITALS
DIASTOLIC BLOOD PRESSURE: 68 MMHG | TEMPERATURE: 98.7 F | WEIGHT: 142 LBS | HEART RATE: 89 BPM | BODY MASS INDEX: 24.37 KG/M2 | SYSTOLIC BLOOD PRESSURE: 100 MMHG

## 2017-06-28 DIAGNOSIS — M62.838 NECK MUSCLE SPASM: Primary | ICD-10-CM

## 2017-06-28 PROCEDURE — 99213 OFFICE O/P EST LOW 20 MIN: CPT | Performed by: INTERNAL MEDICINE

## 2017-06-28 RX ORDER — METHOCARBAMOL 750 MG/1
1500 TABLET, FILM COATED ORAL 3 TIMES DAILY PRN
Qty: 180 TABLET | Refills: 1 | Status: ON HOLD | OUTPATIENT
Start: 2017-06-28 | End: 2017-07-19

## 2017-06-28 ASSESSMENT — ANXIETY QUESTIONNAIRES
6. BECOMING EASILY ANNOYED OR IRRITABLE: NOT AT ALL
5. BEING SO RESTLESS THAT IT IS HARD TO SIT STILL: NOT AT ALL
2. NOT BEING ABLE TO STOP OR CONTROL WORRYING: NOT AT ALL
3. WORRYING TOO MUCH ABOUT DIFFERENT THINGS: SEVERAL DAYS
7. FEELING AFRAID AS IF SOMETHING AWFUL MIGHT HAPPEN: SEVERAL DAYS
IF YOU CHECKED OFF ANY PROBLEMS ON THIS QUESTIONNAIRE, HOW DIFFICULT HAVE THESE PROBLEMS MADE IT FOR YOU TO DO YOUR WORK, TAKE CARE OF THINGS AT HOME, OR GET ALONG WITH OTHER PEOPLE: SOMEWHAT DIFFICULT
1. FEELING NERVOUS, ANXIOUS, OR ON EDGE: SEVERAL DAYS
GAD7 TOTAL SCORE: 4

## 2017-06-28 ASSESSMENT — PATIENT HEALTH QUESTIONNAIRE - PHQ9: 5. POOR APPETITE OR OVEREATING: SEVERAL DAYS

## 2017-06-28 NOTE — MR AVS SNAPSHOT
After Visit Summary   6/28/2017    Julia Smith    MRN: [id number]           Patient Information     Date Of Birth          1985        Visit Information        Provider Department      6/28/2017 6:20 PM Jillian Palma MD [facility]        Today's Diagnoses     Neck muscle spasm    -  1       Follow-ups after your visit        Your next 10 appointments already scheduled     Jul 19, 2017   Procedure with Justin Wells MD   [facility] (--)    [address]  [facility] [address] [phone]              Who to contact     If you have questions or need follow up information about today's clinic visit or your schedule please contact [facility] [place] directly at [phone].  Normal or non-critical lab and imaging results will be communicated to you by Receepthart, letter or phone within 4 business days after the clinic has received the results. If you do not hear from us within 7 days, please contact the clinic through MyChart or phone. If you have a critical or abnormal lab result, we will notify you by phone as soon as possible.  Submit refill requests through PulseOn or call your pharmacy and they will forward the refill request to us. Please allow 3 business days for your refill to be completed.          Additional Information About Your Visit        MyChart Information     PulseOn gives you secure access to your electronic health record. If you see a primary care provider, you can also send messages to your care team and make appointments. If you have questions, please call your primary care clinic.  If you do not have a primary care provider, please call [phone] and they will assist you.        Care EveryWhere ID     This is your Care EveryWhere ID. This could be used by other organizations to access your [facility] medical records  [id number]        Your Vitals Were     Pulse Temperature Last Period BMI  (Body Mass Index)          89 98.7  F (37.1  C) (Tympanic) 01/03/2014 24.37 kg/m2         Blood Pressure from Last 3 Encounters:   06/28/17 100/68   06/21/17 90/66   06/08/17 96/74    Weight from Last 3 Encounters:   06/28/17 142 lb (64.4 kg)   06/07/17 145 lb (65.8 kg)   05/25/17 147 lb (66.7 kg)              Today, you had the following     No orders found for display         Today's Medication Changes          These changes are accurate as of: 6/28/17 11:59 PM.  If you have any questions, ask your nurse or doctor.               Start taking these medicines.        Dose/Directions    methocarbamol 750 MG tablet   Commonly known as:  ROBAXIN   Used for:  Neck muscle spasm   Started by:  Jillian Palma MD        Dose:  1500 mg   Take 2 tablets (1,500 mg) by mouth 3 times daily as needed for muscle spasms   Quantity:  180 tablet   Refills:  1            Where to get your medicines      These medications were sent to [id number] - [place], [place] - [address]  AT [address]  [address] , [place] [address]     Phone:  [phone]     methocarbamol 750 MG tablet                Primary Care Provider    Physician No Ref-Primary       No address on file        Equal Access to Services     CHICHI HUGHES AH: Hadii aad ku hadasho Soomaali, waaxda luqadaha, qaybta kaalmada adeegyada, shimon thomas. So [facility] [phone].    ATENCIÓN: Si habla español, tiene a smith disposición servicios gratuitos de asistencia lingüística. Llame al [phone].    We comply with applicable federal civil rights laws and [place] laws. We do not discriminate on the basis of race, color, national origin, age, disability sex, sexual orientation or gender identity.            Thank you!     Thank you for choosing [facility] [place]  for your care. Our goal is always to provide you with excellent care. Hearing back from our patients is one way  we can continue to improve our services. Please take a few minutes to complete the written survey that you may receive in the mail after your visit with us. Thank you!             Your Updated Medication List - Protect others around you: Learn how to safely use, store and throw away your medicines at www.disposemymeds.org.          This list is accurate as of: 6/28/17 11:59 PM.  Always use your most recent med list.                   Brand Name Dispense Instructions for use Diagnosis    ADDERALL PO           benzonatate 100 MG capsule    TESSALON    30 capsule    Take 1 capsule (100 mg) by mouth 3 times daily as needed    Cough       clonazePAM 0.5 MG tablet    klonoPIN     TK 1 T PO  HS.        lamoTRIgine 150 MG tablet    LaMICtal          LYRICA 50 MG capsule   Generic drug:  pregabalin      TK 1 C PO TID.        methocarbamol 750 MG tablet    ROBAXIN    180 tablet    Take 2 tablets (1,500 mg) by mouth 3 times daily as needed for muscle spasms    Neck muscle spasm       ORTHO TRI-CYCLEN LO 0.18/0.215/0.25 MG-25 MCG per tablet   Generic drug:  norgestim-eth estrad triphasic      Take 1 tablet by mouth daily        penicillin G benzathine [id number] UNIT/2ML injection    BICILLIN L-A    2 mL    1.2 million units injected IM x 1    Acute recurrent streptococcal tonsillitis       sertraline 100 MG tablet    ZOLOFT    30 tablet    Take 1 tablet (100 mg) by mouth At Bedtime    REINA (generalized anxiety disorder)       VITAMIN D (CHOLECALCIFEROL) PO      Take 4,000 Units by mouth daily

## 2017-06-28 NOTE — PROGRESS NOTES
SUBJECTIVE:                                                    Julia Smith is a 32 year old female who presents to clinic today for the following health issues:      Joint Pain    Onset: yesterday     Description:   Location: base of cranium on the right side   Character: Cramping    Intensity: moderate, severe    Progression of Symptoms: intermittent    Accompanying Signs & Symptoms:  Other symptoms: radiation of pain to down the back on the right side     History:   Previous similar pain: YES      Precipitating factors:   Trauma or overuse: YES- she was stretching in a recliner then jumped out and felt a pop in her neck hurt immediately     Alleviating factors:  Improved by: heat    Therapies Tried and outcome: tylenol, heat, ice, aspercreams     Julia is here with right sided neck and arm pain that started yesterday after getting up quickly from a recliner. It is hard to turn her head to the right. Certainly movements cause pain to shoot down her arm.  She hs been using tylenol, heating pad, aspercream which do help. She is seeing a gastroenterologist and was told to avoid NSAIDs.  She has some robaxin from a previous prescription, that helps a little also.         Reviewed and updated as needed this visit by clinical staff  Tobacco  Allergies  Meds              ROS:  Msk, neuro reviewed, otherwise negative unless noted above.     OBJECTIVE:     /68 (BP Location: Left arm, Patient Position: Chair, Cuff Size: Adult Regular)  Pulse 89  Temp 98.7  F (37.1  C) (Tympanic)  Wt 142 lb (64.4 kg)  LMP 01/03/2014  BMI 24.37 kg/m2  Body mass index is 24.37 kg/(m^2).    Gen: pleasant young woman, no distress  MSK: neck with limited ROM due to pain. Tender to even light palpation over neck and upper shoulders, R>L.   Neuro: full strength with shoulder abduction, elbow flexion and extension, and .         ASSESSMENT/PLAN:       1. Neck muscle spasm  Reassurance provided. Continue tylenol, heating pad.  Gentle ROM. Refilled muscle relaxant.   - methocarbamol (ROBAXIN) 750 MG tablet; Take 2 tablets (1,500 mg) by mouth 3 times daily as needed for muscle spasms  Dispense: 180 tablet; Refill: 1    F/U as needed for persistent or worsening symptoms.       Jillian Palma MD  [facility]

## 2017-06-28 NOTE — NURSING NOTE
"Chief Complaint   Patient presents with     Musculoskeletal Problem       Initial /68 (BP Location: Left arm, Patient Position: Chair, Cuff Size: Adult Regular)  Pulse 89  Temp 98.7  F (37.1  C) (Tympanic)  Wt 142 lb (64.4 kg)  LMP 01/03/2014  BMI 24.37 kg/m2 Estimated body mass index is 24.37 kg/(m^2) as calculated from the following:    Height as of 6/7/17: 5' 4\" (1.626 m).    Weight as of this encounter: 142 lb (64.4 kg).  Medication Reconciliation: complete  "

## 2017-06-29 ASSESSMENT — ANXIETY QUESTIONNAIRES: GAD7 TOTAL SCORE: 4

## 2017-06-29 ASSESSMENT — PATIENT HEALTH QUESTIONNAIRE - PHQ9: SUM OF ALL RESPONSES TO PHQ QUESTIONS 1-9: 4

## 2017-07-07 ENCOUNTER — HOSPITAL ENCOUNTER (EMERGENCY)
Facility: CLINIC | Age: 32
Discharge: HOME | End: 2017-07-07
Attending: EMERGENCY MEDICINE | Admitting: EMERGENCY MEDICINE
Payer: COMMERCIAL

## 2017-07-07 ENCOUNTER — APPOINTMENT (OUTPATIENT)
Dept: ULTRASOUND IMAGING | Facility: CLINIC | Age: 32
End: 2017-07-07
Attending: EMERGENCY MEDICINE
Payer: COMMERCIAL

## 2017-07-07 VITALS
DIASTOLIC BLOOD PRESSURE: 84 MMHG | TEMPERATURE: 98.9 F | HEIGHT: 64 IN | WEIGHT: 141 LBS | OXYGEN SATURATION: 97 % | RESPIRATION RATE: 16 BRPM | BODY MASS INDEX: 24.07 KG/M2 | SYSTOLIC BLOOD PRESSURE: 114 MMHG | HEART RATE: 94 BPM

## 2017-07-07 DIAGNOSIS — R10.31 ABDOMINAL PAIN, RIGHT LOWER QUADRANT: ICD-10-CM

## 2017-07-07 LAB
ALBUMIN SERPL-MCNC: 3.6 G/DL (ref 3.4–5)
ALP SERPL-CCNC: 72 U/L (ref 40–150)
ALT SERPL W P-5'-P-CCNC: 15 U/L (ref 0–50)
ANION GAP SERPL CALCULATED.3IONS-SCNC: 7 MMOL/L (ref 3–14)
AST SERPL W P-5'-P-CCNC: 10 U/L (ref 0–45)
BASOPHILS # BLD AUTO: 0 10E9/L (ref 0–0.2)
BASOPHILS NFR BLD AUTO: 0.3 %
BILIRUB SERPL-MCNC: 0.2 MG/DL (ref 0.2–1.3)
BUN SERPL-MCNC: 7 MG/DL (ref 7–30)
CALCIUM SERPL-MCNC: 8.8 MG/DL (ref 8.5–10.1)
CHLORIDE SERPL-SCNC: 106 MMOL/L (ref 94–109)
CO2 SERPL-SCNC: 27 MMOL/L (ref 20–32)
CREAT SERPL-MCNC: 0.62 MG/DL (ref 0.52–1.04)
DIFFERENTIAL METHOD BLD: NORMAL
EOSINOPHIL # BLD AUTO: 0.1 10E9/L (ref 0–0.7)
EOSINOPHIL NFR BLD AUTO: 1.2 %
ERYTHROCYTE [DISTWIDTH] IN BLOOD BY AUTOMATED COUNT: 13.7 % (ref 10–15)
GFR SERPL CREATININE-BSD FRML MDRD: NORMAL ML/MIN/1.7M2
GLUCOSE SERPL-MCNC: 81 MG/DL (ref 70–99)
HCT VFR BLD AUTO: 39.7 % (ref 35–47)
HGB BLD-MCNC: 14 G/DL (ref 11.7–15.7)
IMM GRANULOCYTES # BLD: 0 10E9/L (ref 0–0.4)
IMM GRANULOCYTES NFR BLD: 0.3 %
LIPASE SERPL-CCNC: 192 U/L (ref 73–393)
LYMPHOCYTES # BLD AUTO: 4.2 10E9/L (ref 0.8–5.3)
LYMPHOCYTES NFR BLD AUTO: 41.8 %
MCH RBC QN AUTO: 30.9 PG (ref 26.5–33)
MCHC RBC AUTO-ENTMCNC: 35.3 G/DL (ref 31.5–36.5)
MCV RBC AUTO: 88 FL (ref 78–100)
MONOCYTES # BLD AUTO: 0.6 10E9/L (ref 0–1.3)
MONOCYTES NFR BLD AUTO: 5.6 %
NEUTROPHILS # BLD AUTO: 5.1 10E9/L (ref 1.6–8.3)
NEUTROPHILS NFR BLD AUTO: 50.8 %
NRBC # BLD AUTO: 0 10*3/UL
NRBC BLD AUTO-RTO: 0 /100
PLATELET # BLD AUTO: 345 10E9/L (ref 150–450)
POTASSIUM SERPL-SCNC: 3.6 MMOL/L (ref 3.4–5.3)
PROT SERPL-MCNC: 7.9 G/DL (ref 6.8–8.8)
RBC # BLD AUTO: 4.53 10E12/L (ref 3.8–5.2)
SODIUM SERPL-SCNC: 140 MMOL/L (ref 133–144)
WBC # BLD AUTO: 10 10E9/L (ref 4–11)

## 2017-07-07 PROCEDURE — 85025 COMPLETE CBC W/AUTO DIFF WBC: CPT | Performed by: EMERGENCY MEDICINE

## 2017-07-07 PROCEDURE — 96376 TX/PRO/DX INJ SAME DRUG ADON: CPT

## 2017-07-07 PROCEDURE — 99285 EMERGENCY DEPT VISIT HI MDM: CPT | Mod: 25

## 2017-07-07 PROCEDURE — 96361 HYDRATE IV INFUSION ADD-ON: CPT

## 2017-07-07 PROCEDURE — 83690 ASSAY OF LIPASE: CPT | Performed by: EMERGENCY MEDICINE

## 2017-07-07 PROCEDURE — 96374 THER/PROPH/DIAG INJ IV PUSH: CPT

## 2017-07-07 PROCEDURE — 93976 VASCULAR STUDY: CPT

## 2017-07-07 PROCEDURE — 80053 COMPREHEN METABOLIC PANEL: CPT | Performed by: EMERGENCY MEDICINE

## 2017-07-07 PROCEDURE — 96375 TX/PRO/DX INJ NEW DRUG ADDON: CPT

## 2017-07-07 PROCEDURE — 25000128 H RX IP 250 OP 636: Performed by: EMERGENCY MEDICINE

## 2017-07-07 RX ORDER — SODIUM CHLORIDE 9 MG/ML
1000 INJECTION, SOLUTION INTRAVENOUS CONTINUOUS
Status: DISCONTINUED | OUTPATIENT
Start: 2017-07-07 | End: 2017-07-07 | Stop reason: HOSPADM

## 2017-07-07 RX ORDER — ONDANSETRON 2 MG/ML
4 INJECTION INTRAMUSCULAR; INTRAVENOUS
Status: COMPLETED | OUTPATIENT
Start: 2017-07-07 | End: 2017-07-07

## 2017-07-07 RX ORDER — MORPHINE SULFATE 4 MG/ML
4 INJECTION, SOLUTION INTRAMUSCULAR; INTRAVENOUS
Status: DISCONTINUED | OUTPATIENT
Start: 2017-07-07 | End: 2017-07-07 | Stop reason: HOSPADM

## 2017-07-07 RX ADMIN — ONDANSETRON 4 MG: 2 SOLUTION INTRAMUSCULAR; INTRAVENOUS at 19:42

## 2017-07-07 RX ADMIN — SODIUM CHLORIDE 1000 ML: 9 INJECTION, SOLUTION INTRAVENOUS at 20:57

## 2017-07-07 RX ADMIN — MORPHINE SULFATE 4 MG: 4 INJECTION, SOLUTION INTRAMUSCULAR; INTRAVENOUS at 20:52

## 2017-07-07 RX ADMIN — MORPHINE SULFATE 4 MG: 4 INJECTION, SOLUTION INTRAMUSCULAR; INTRAVENOUS at 19:48

## 2017-07-07 RX ADMIN — SODIUM CHLORIDE 1000 ML: 9 INJECTION, SOLUTION INTRAVENOUS at 19:38

## 2017-07-07 ASSESSMENT — ENCOUNTER SYMPTOMS
ABDOMINAL PAIN: 1
VOMITING: 1
DIARRHEA: 1

## 2017-07-07 NOTE — ED PROVIDER NOTES
History     Chief Complaint:  Abdominal Pain    HPI   Julia Smith is a 32 year old female with a history of PCOS and endometriosis who presents to the emergency department for evaluation of abdominal pain. Of note, the patient is having surgery on July 17 for right ovary adhesions. The patient also has a history of several abdominal surgeries. In this context, the patient reports an increase in her baseline ovarian pain yesterday with associated vomiting beginning this morning which prompted the patient to seek evaluation here in the emergency department because the pain was so severe. The patient also reports diarrhea, but notes that this has been ongoing for the past 7 months secondary to her history of abdominal surgeries.     Allergies:  Decadron  Depakote  ibuprofen  Tramadol  Valproic acid    Medications:    Robaxin  Klonopin  Ortho tri-cyclen lo  Adderall  Penicillin  Tessalon  Lamictal  Lyrica  Zoloft    Past Medical History:    Bipolar 2 disorder  Endometriosis  Fibromyalgia  GERD  Migraine  Panic attacks  Anxiety  Syncope   PCOS  TMJ    Past Surgical History:    Colonoscopy  Cystoscopy  Excision of endometriosis  Hysterectomy  Bilateral salpingectomy  Appendectomy     Family History:    No past pertinent family history.    Social History:  Current smoker: 1/2 pack per day  Negative for alcohol use.  Marital Status:   [2]     Review of Systems   Gastrointestinal: Positive for abdominal pain, diarrhea and vomiting.   All other systems reviewed and are negative.    Physical Exam     Physical Exam  Patient Vitals for the past 24 hrs:   BP Temp Temp src Pulse Heart Rate Resp SpO2 Height Weight   07/07/17 2137 114/84 - - - 62 - 97 % - -   07/07/17 2126 114/84 - - - - - - - -   07/07/17 2120 108/75 - - - - - 96 % - -   07/07/17 2100 111/76 - - - - - 96 % - -   07/07/17 2000 104/70 - - - - - 97 % - -   07/07/17 1950 105/70 - - - - - 99 % - -   07/07/17 1948 - - - - - - 99 % - -   07/07/17 1738 111/68  "98.9  F (37.2  C) Oral 94 - 16 99 % 1.626 m (5' 4\") 64 kg (141 lb)       General: Alert and Interactive. Tearful.  Head: No signs of trauma.   Mouth/Throat: Oropharynx is clear and moist.   Eyes: Conjunctivae are normal. Pupils are equal, round, and reactive to light.   Neck: Normal range of motion. No nuchal rigidity.   CV: Normal rate and regular rhythm.    Resp: Effort normal and breath sounds normal. No respiratory distress.   GI: Soft. Inferior RLQ pain and tenderness with palpation.   MSK: Normal range of motion. no edema.   Neuro: The patient is alert and oriented to person, place, and time.  PERRLA, EOMI, strength in upper/lower extremities normal and symmetrical.   Sensation normal. Speech normal.  GCS eye subscore is 4. GCS verbal subscore is 5. GCS motor subscore is 6.   Skin: Skin is warm and dry. No rash noted.   Psych: normal mood and affect. behavior is normal.     Emergency Department Course     Imaging:  Radiographic findings were communicated with the patient who voiced understanding of the findings.    US Pelvic Complete w Transvaginal & Abd/Pel Duplex Limited:  1. Right ovary appears normal. No evidence for right ovarian torsion.  2. The uterus and left ovary are not identified. They reportedly have  been removed. As per radiology.     Laboratory:  CBC: WBC: 10.0, HGB: 14.0, PLT: 345  CMP: All WNL (Creatinine: 0.62)  1935 Lipase: 192    Interventions:  1938 NS 1L IV  1942 Zofran, 4 mg, IV injection  1948 Morphine 4 mg IV  2052 Morphine 4 mg IV      Emergency Department Course:  Nursing notes and vitals reviewed. I performed an exam of the patient as documented above.     IV inserted. Medicine administered as documented above. Blood drawn. This was sent to the lab for further testing, results above.    The patient was sent for a abdominal ultrasound while in the emergency department, findings above.     2112 I rechecked the patient and offered to do a CT scan. The patient reports she is feeling " better and would like to hold off on the scan.    Findings and plan explained to the Patient. Patient discharged home with instructions regarding supportive care, medications, and reasons to return. The importance of close follow-up was reviewed.     I personally reviewed the laboratory results with the Patient and answered all related questions prior to discharge.       Impression & Plan      Medical Decision Making:  Crystal Smith is presenting to the ER complaining of abdominal pain. This pain is in her right lower quadrant which is typical for her. She states this pain is secondary to pain coming from her ovary. I am concerned about ovarian torsion. Her ultrasound, however, is showing normal blood flow to the ovary and there is no large cyst on the ovary to make her at risk of intermittent torsion. The patient is currently asymptomatic and is feeling much better. Torsion is unlikely but still on the differential.  I also considered small bowel obstruction given her history of multiple abdominal surgeries and her current symptoms.    I offered to do a CT scan of her abdomen initially but now recommend against it because she is feeling better. She can return to the ER if the pain resumes.      Diagnosis:    ICD-10-CM    1. Abdominal pain, right lower quadrant R10.31        Disposition:  discharged to home    I, Jacquie Moeller, am serving as a scribe on 7/7/2017 at 6:57 PM to personally document services performed by Jose Pelaez MD based on my observations and the provider's statements to me.       Jacquie Moeller  7/7/2017    EMERGENCY DEPARTMENT       Jose Pelaez MD  07/08/17 0231

## 2017-07-07 NOTE — ED AVS SNAPSHOT
Emergency Department    [address] [facility] [address]    Phone:  [phone]    Fax:  [phone]                                       Julia Smith   MRN: [id number]    Department:   Emergency Department   Date of Visit:  7/7/2017           Patient Information     Date Of Birth          1985        Your diagnoses for this visit were:     Abdominal pain, right lower quadrant        You were seen by Jose Pelaez MD.      Follow-up Information     Follow up with  Emergency Department Today.    Specialty:  EMERGENCY MEDICINE    Why:  If symptoms return    Contact information:    [address] [facility] [id number] [phone]        Discharge Instructions         *Abdominal Pain, Unknown Cause (Female)    The exact cause of your abdominal (stomach) pain is not certain. This does not mean that this is something to worry about, or the right tests were not done. Everyone likes to know the exact cause of the problem, but sometimes with abdominal pain, there is no clear-cut cause, and this could be a good thing. The good news is that your symptoms can be treated, and you will feel better.   Your condition does not seem serious now; however, sometimes the signs of a serious problem may take more time to appear. For this reason, it is important for you to watch for any new symptoms, problems, or worsening of your condition.  Over the next few days, the abdominal pain may come and go, or be continuous. Other common symptoms can include nausea and vomiting. Sometimes it can be difficult to tell if you feel nauseous, you may just feel bad and not associate that feeling with nausea. Constipation, diarrhea, and a fever may go along with the pain.  The pain may continue even if treated correctly over the following days. Depending on how things go, sometimes the cause can become clear and may require further or different treatment. Additional evaluations, medications, or  tests may be needed.  Home care  Your health care provider may prescribe medications for pain, symptoms, or an infection.  Follow the health care provider's instructions for taking these medications.  General care    Rest until your next exam. No strenuous activities.    Try to find positions that ease discomfort. A small pillow placed on the abdomen may help relieve pain.    Something warm on your abdomen (such as a heating pad) may help, but be careful not to burn yourself.  Diet    Do not force yourself to eat, especially if having cramps, vomiting, or diarrhea.    Water is important so you do not get dehydrated. Soup may also be good. Sports drinks may also help, especially if they are not too acidic. Make sure you don't drink sugary drinks as this can make things worse. Take liquids in small amounts. Do not guzzle them.    Caffeine sometimes makes the pain and cramping worse.    Avoid dairy products if you have vomiting or diarrhea.    Don't eat large amounts at a time. Wait a few minutes between bites.    Eat a diet low in fiber (called a low-residue diet). Foods allowed include refined breads, white rice, fruit and vegetable juices without pulp, tender meats. These foods will pass more easily through the intestine.    Avoid fried or fatty foods, dairy, alcohol and spicy foods until your symptoms go away.  Follow-up care  Follow up with your health care provider as instructed, or if your pain does not begin to improve in the next 24 hours.  When to seek medical care  Seek prompt medical care if any of the following occur:    Pain gets worse or moves to the right lower abdomen    New or worsening vomiting or diarrhea    Swelling of the abdomen    Unable to pass stool for more than three days    New fever over 101  F (38.3 C), or rising fever    Blood in vomit or bowel movements (dark red or black color)    Jaundice (yellow color of eyes and skin)    Weakness, dizziness    Chest, arm, back, neck or jaw  pain    Unexpected vaginal bleeding or missed period  Call 911  Call emergency services if any of the following occur:    Trouble breathing    Confusion    Fainting or loss of consciousness    Rapid heart rate    Seizure    [id number] Isabella Ambrosio, [address] [facility], [place] [address]. All rights reserved. This information is not intended as a substitute for professional medical care. Always follow your healthcare professional's instructions.          24 Hour Appointment Hotline       To make an appointment at any [facility], call [phone] ([phone]). If you don't have a family doctor or clinic, we will help you find one. [facility] clinics are conveniently located to serve the needs of you and your family.             Review of your medicines      Our records show that you are taking the medicines listed below. If these are incorrect, please call your family doctor or clinic.        Dose / Directions Last dose taken    ADDERALL PO        Refills:  0        benzonatate 100 MG capsule   Commonly known as:  TESSALON   Dose:  100 mg   Quantity:  30 capsule        Take 1 capsule (100 mg) by mouth 3 times daily as needed   Refills:  0        clonazePAM 0.5 MG tablet   Commonly known as:  klonoPIN        TK 1 T PO  HS.   Refills:  3        lamoTRIgine 150 MG tablet   Commonly known as:  LaMICtal        Refills:  1        LYRICA 50 MG capsule   Generic drug:  pregabalin        TK 1 C PO TID.   Refills:  0        methocarbamol 750 MG tablet   Commonly known as:  ROBAXIN   Dose:  1500 mg   Quantity:  180 tablet        Take 2 tablets (1,500 mg) by mouth 3 times daily as needed for muscle spasms   Refills:  1        ORTHO TRI-CYCLEN LO 0.18/0.215/0.25 MG-25 MCG per tablet   Dose:  1 tablet   Generic drug:  norgestim-eth estrad triphasic        Take 1 tablet by mouth daily   Refills:  0        penicillin G benzathine [id number] UNIT/2ML injection   Commonly known as:  BICILLIN L-A   Quantity:  2 mL        1.2  million units injected IM x 1   Refills:  0        sertraline 100 MG tablet   Commonly known as:  ZOLOFT   Dose:  100 mg   Quantity:  30 tablet        Take 1 tablet (100 mg) by mouth At Bedtime   Refills:  0        VITAMIN D (CHOLECALCIFEROL) PO   Dose:  4000 Units        Take 4,000 Units by mouth daily   Refills:  0                Procedures and tests performed during your visit     CBC with platelets differential    Comprehensive metabolic panel    Lipase    US Pelvic Complete w Transvaginal & Abd/Pel Duplex Limited      Orders Needing Specimen Collection     None      Pending Results     No orders found from 7/5/2017 to 7/8/2017.            Pending Culture Results     No orders found from 7/5/2017 to 7/8/2017.            Pending Results Instructions     If you had any lab results that were not finalized at the time of your Discharge, you can call the ED Lab Result RN at [phone]. You will be contacted by this team for any positive Lab results or changes in treatment. The nurses are available 7 days a week from 10A to 6:30P.  You can leave a message 24 hours per day and they will return your call.        Test Results From Your Hospital Stay        7/7/2017  7:45 PM      Component Results     Component Value Ref Range & Units Status    WBC 10.0 4.0 - 11.0 10e9/L Final    RBC Count 4.53 3.8 - 5.2 10e12/L Final    Hemoglobin 14.0 11.7 - 15.7 g/dL Final    Hematocrit 39.7 35.0 - 47.0 % Final    MCV 88 78 - 100 fl Final    MCH 30.9 26.5 - 33.0 pg Final    MCHC 35.3 31.5 - 36.5 g/dL Final    RDW 13.7 10.0 - 15.0 % Final    Platelet Count 345 150 - 450 10e9/L Final    Diff Method Automated Method  Final    % Neutrophils 50.8 % Final    % Lymphocytes 41.8 % Final    % Monocytes 5.6 % Final    % Eosinophils 1.2 % Final    % Basophils 0.3 % Final    % Immature Granulocytes 0.3 % Final    Nucleated RBCs 0 0 /100 Final    Absolute Neutrophil 5.1 1.6 - 8.3 10e9/L Final    Absolute Lymphocytes 4.2 0.8 - 5.3 10e9/L Final     Absolute Monocytes 0.6 0.0 - 1.3 10e9/L Final    Absolute Eosinophils 0.1 0.0 - 0.7 10e9/L Final    Absolute Basophils 0.0 0.0 - 0.2 10e9/L Final    Abs Immature Granulocytes 0.0 0 - 0.4 10e9/L Final    Absolute Nucleated RBC 0.0  Final         7/7/2017  8:03 PM      Component Results     Component Value Ref Range & Units Status    Sodium 140 133 - 144 mmol/L Final    Potassium 3.6 3.4 - 5.3 mmol/L Final    Chloride 106 94 - 109 mmol/L Final    Carbon Dioxide 27 20 - 32 mmol/L Final    Anion Gap 7 3 - 14 mmol/L Final    Glucose 81 70 - 99 mg/dL Final    Urea Nitrogen 7 7 - 30 mg/dL Final    Creatinine 0.62 0.52 - 1.04 mg/dL Final    GFR Estimate >90  Non  GFR Calc   >60 mL/min/1.7m2 Final    GFR Estimate If Black >90   GFR Calc   >60 mL/min/1.7m2 Final    Calcium 8.8 8.5 - 10.1 mg/dL Final    Bilirubin Total 0.2 0.2 - 1.3 mg/dL Final    Albumin 3.6 3.4 - 5.0 g/dL Final    Protein Total 7.9 6.8 - 8.8 g/dL Final    Alkaline Phosphatase 72 40 - 150 U/L Final    ALT 15 0 - 50 U/L Final    AST 10 0 - 45 U/L Final         7/7/2017  8:00 PM      Component Results     Component Value Ref Range & Units Status    Lipase 192 73 - 393 U/L Final         7/7/2017  8:51 PM      Narrative     US PELVIS COMPLETE W TRANSVAGINAL AND DOPPLER LIMITED  7/7/2017 8:32  PM     HISTORY:  Pelvic pain, history of a hysterectomy and left oophorectomy     COMPARISON: Exam dated 6/7/2017    FINDINGS:  The uterus and left ovary are not visualized. The right  ovary measures 2.7 x 1.7 x 2.1 cm. Normal arterial and venous blood  flow is seen. No free fluid. No adnexal mass.        Impression     IMPRESSION:  1. Right ovary appears normal. No evidence for right ovarian torsion.  2. The uterus and left ovary are not identified. They reportedly have  been removed.    BRYCE LUNA MD                Clinical Quality Measure: Blood Pressure Screening     Your blood pressure was checked while you were in the emergency  department today. The last reading we obtained was  BP: 104/70 . Please read the guidelines below about what these numbers mean and what you should do about them.  If your systolic blood pressure (the top number) is less than 120 and your diastolic blood pressure (the bottom number) is less than 80, then your blood pressure is normal. There is nothing more that you need to do about it.  If your systolic blood pressure (the top number) is 120-139 or your diastolic blood pressure (the bottom number) is 80-89, your blood pressure may be higher than it should be. You should have your blood pressure rechecked within a year by a primary care provider.  If your systolic blood pressure (the top number) is 140 or greater or your diastolic blood pressure (the bottom number) is 90 or greater, you may have high blood pressure. High blood pressure is treatable, but if left untreated over time it can put you at risk for heart attack, stroke, or kidney failure. You should have your blood pressure rechecked by a primary care provider within the next 4 weeks.  If your provider in the emergency department today gave you specific instructions to follow-up with your doctor or provider even sooner than that, you should follow that instruction and not wait for up to 4 weeks for your follow-up visit.        Thank you for choosing [facility]       Thank you for choosing [facility] for your care. Our goal is always to provide you with excellent care. Hearing back from our patients is one way we can continue to improve our services. Please take a few minutes to complete the written survey that you may receive in the mail after you visit with us. Thank you!        [facility] Information     [employer] gives you secure access to your electronic health record. If you see a primary care provider, you can also send messages to your care team and make appointments. If you have questions, please call your primary care clinic.  If you do not have a primary  care provider, please call [phone] and they will assist you.        Care EveryWhere ID     This is your Care EveryWhere ID. This could be used by other organizations to access your [facility] medical records  [id number]        Equal Access to Services     CHICHI HUGHES : Marsha Perrin, waandrei sanders, qamichael kaalmaizzy lynch, shimon thomas. So [facility] [phone].    ATENCIÓN: Si habla español, tiene a smith disposición servicios gratuitos de asistencia lingüística. Llame al [phone].    We comply with applicable federal civil rights laws and [place] laws. We do not discriminate on the basis of race, color, national origin, age, disability sex, sexual orientation or gender identity.            After Visit Summary       This is your record. Keep this with you and show to your community pharmacist(s) and doctor(s) at your next visit.

## 2017-07-07 NOTE — ED AVS SNAPSHOT
Emergency Department    [address] [facility] [address]    Phone:  [phone]    Fax:  [phone]                                       Julia Smith   MRN: [id number]    Department:   Emergency Department   Date of Visit:  7/7/2017           After Visit Summary Signature Page     I have received my discharge instructions, and my questions have been answered. I have discussed any challenges I see with this plan with the nurse or doctor.    ..........................................................................................................................................  Patient/Patient Representative Signature      ..........................................................................................................................................  Patient Representative Print Name and Relationship to Patient    ..................................................               ................................................  Date                                            Time    ..........................................................................................................................................  Reviewed by Signature/Title    ...................................................              ..............................................  Date                                                            Time

## 2017-07-08 NOTE — ED NOTES
Assumed care at this time.  Kasia Watts RN,.......................................... 7/7/2017   7:24 PM

## 2017-07-08 NOTE — DISCHARGE INSTRUCTIONS
*Abdominal Pain, Unknown Cause (Female)    The exact cause of your abdominal (stomach) pain is not certain. This does not mean that this is something to worry about, or the right tests were not done. Everyone likes to know the exact cause of the problem, but sometimes with abdominal pain, there is no clear-cut cause, and this could be a good thing. The good news is that your symptoms can be treated, and you will feel better.   Your condition does not seem serious now; however, sometimes the signs of a serious problem may take more time to appear. For this reason, it is important for you to watch for any new symptoms, problems, or worsening of your condition.  Over the next few days, the abdominal pain may come and go, or be continuous. Other common symptoms can include nausea and vomiting. Sometimes it can be difficult to tell if you feel nauseous, you may just feel bad and not associate that feeling with nausea. Constipation, diarrhea, and a fever may go along with the pain.  The pain may continue even if treated correctly over the following days. Depending on how things go, sometimes the cause can become clear and may require further or different treatment. Additional evaluations, medications, or tests may be needed.  Home care  Your health care provider may prescribe medications for pain, symptoms, or an infection.  Follow the health care provider's instructions for taking these medications.  General care    Rest until your next exam. No strenuous activities.    Try to find positions that ease discomfort. A small pillow placed on the abdomen may help relieve pain.    Something warm on your abdomen (such as a heating pad) may help, but be careful not to burn yourself.  Diet    Do not force yourself to eat, especially if having cramps, vomiting, or diarrhea.    Water is important so you do not get dehydrated. Soup may also be good. Sports drinks may also help, especially if they are not too acidic. Make sure you  don't drink sugary drinks as this can make things worse. Take liquids in small amounts. Do not guzzle them.    Caffeine sometimes makes the pain and cramping worse.    Avoid dairy products if you have vomiting or diarrhea.    Don't eat large amounts at a time. Wait a few minutes between bites.    Eat a diet low in fiber (called a low-residue diet). Foods allowed include refined breads, white rice, fruit and vegetable juices without pulp, tender meats. These foods will pass more easily through the intestine.    Avoid fried or fatty foods, dairy, alcohol and spicy foods until your symptoms go away.  Follow-up care  Follow up with your health care provider as instructed, or if your pain does not begin to improve in the next 24 hours.  When to seek medical care  Seek prompt medical care if any of the following occur:    Pain gets worse or moves to the right lower abdomen    New or worsening vomiting or diarrhea    Swelling of the abdomen    Unable to pass stool for more than three days    New fever over 101  F (38.3 C), or rising fever    Blood in vomit or bowel movements (dark red or black color)    Jaundice (yellow color of eyes and skin)    Weakness, dizziness    Chest, arm, back, neck or jaw pain    Unexpected vaginal bleeding or missed period  Call 911  Call emergency services if any of the following occur:    Trouble breathing    Confusion    Fainting or loss of consciousness    Rapid heart rate    Seizure    2602-7617 [facility], [address] [place] [address], [place] [address]. All rights reserved. This information is not intended as a substitute for professional medical care. Always follow your healthcare professional's instructions.

## 2017-07-18 RX ORDER — PROMETHAZINE HYDROCHLORIDE 25 MG/1
25 TABLET ORAL EVERY 4 HOURS PRN
Status: ON HOLD | COMMUNITY
End: 2017-07-19

## 2017-07-18 RX ORDER — BUTALBITAL, ACETAMINOPHEN AND CAFFEINE 50; 325; 40 MG/1; MG/1; MG/1
1 TABLET ORAL 3 TIMES DAILY PRN
Status: ON HOLD | COMMUNITY
End: 2018-05-09

## 2017-07-19 ENCOUNTER — ANESTHESIA EVENT (OUTPATIENT)
Dept: SURGERY | Facility: CLINIC | Age: 32
End: 2017-07-19
Payer: COMMERCIAL

## 2017-07-19 ENCOUNTER — SURGERY (OUTPATIENT)
Age: 32
End: 2017-07-19

## 2017-07-19 ENCOUNTER — ANESTHESIA (OUTPATIENT)
Dept: SURGERY | Facility: CLINIC | Age: 32
End: 2017-07-19
Payer: COMMERCIAL

## 2017-07-19 ENCOUNTER — HOSPITAL ENCOUNTER (OUTPATIENT)
Facility: CLINIC | Age: 32
Discharge: HOME | End: 2017-07-20
Attending: OBSTETRICS & GYNECOLOGY | Admitting: OBSTETRICS & GYNECOLOGY
Payer: COMMERCIAL

## 2017-07-19 DIAGNOSIS — G89.18 ACUTE POST-OPERATIVE PAIN: Primary | ICD-10-CM

## 2017-07-19 DIAGNOSIS — R50.9 FEVER, UNSPECIFIED: ICD-10-CM

## 2017-07-19 LAB — HGB BLD-MCNC: 13.6 G/DL (ref 11.7–15.7)

## 2017-07-19 PROCEDURE — 25000128 H RX IP 250 OP 636: Performed by: OBSTETRICS & GYNECOLOGY

## 2017-07-19 PROCEDURE — 88305 TISSUE EXAM BY PATHOLOGIST: CPT | Performed by: OBSTETRICS & GYNECOLOGY

## 2017-07-19 PROCEDURE — 71000013 ZZH RECOVERY PHASE 1 LEVEL 1 EA ADDTL HR: Performed by: OBSTETRICS & GYNECOLOGY

## 2017-07-19 PROCEDURE — 25000128 H RX IP 250 OP 636: Performed by: ANESTHESIOLOGY

## 2017-07-19 PROCEDURE — 71000012 ZZH RECOVERY PHASE 1 LEVEL 1 FIRST HR: Performed by: OBSTETRICS & GYNECOLOGY

## 2017-07-19 PROCEDURE — 37000009 ZZH ANESTHESIA TECHNICAL FEE, EACH ADDTL 15 MIN: Performed by: OBSTETRICS & GYNECOLOGY

## 2017-07-19 PROCEDURE — 25000125 ZZHC RX 250: Performed by: NURSE ANESTHETIST, CERTIFIED REGISTERED

## 2017-07-19 PROCEDURE — 25000566 ZZH SEVOFLURANE, EA 15 MIN: Performed by: OBSTETRICS & GYNECOLOGY

## 2017-07-19 PROCEDURE — 25000125 ZZHC RX 250: Performed by: OBSTETRICS & GYNECOLOGY

## 2017-07-19 PROCEDURE — 25000301 ZZH OR RX SURGIFLO W/THROMBIN KIT 2ML 1991 OPNP: Performed by: OBSTETRICS & GYNECOLOGY

## 2017-07-19 PROCEDURE — 40000935 ZZH STATISTIC OUTPATIENT (NON-OBS) EVE

## 2017-07-19 PROCEDURE — 27210995 ZZH RX 272: Performed by: OBSTETRICS & GYNECOLOGY

## 2017-07-19 PROCEDURE — 25800025 ZZH RX 258: Performed by: OBSTETRICS & GYNECOLOGY

## 2017-07-19 PROCEDURE — 88305 TISSUE EXAM BY PATHOLOGIST: CPT | Mod: 26 | Performed by: OBSTETRICS & GYNECOLOGY

## 2017-07-19 PROCEDURE — 36000085 ZZH SURGERY LEVEL 8 1ST 30 MIN: Performed by: OBSTETRICS & GYNECOLOGY

## 2017-07-19 PROCEDURE — 40000936 ZZH STATISTIC OUTPATIENT (NON-OBS) NIGHT

## 2017-07-19 PROCEDURE — C1765 ADHESION BARRIER: HCPCS | Performed by: OBSTETRICS & GYNECOLOGY

## 2017-07-19 PROCEDURE — 40000170 ZZH STATISTIC PRE-PROCEDURE ASSESSMENT II: Performed by: OBSTETRICS & GYNECOLOGY

## 2017-07-19 PROCEDURE — 27210794 ZZH OR GENERAL SUPPLY STERILE: Performed by: OBSTETRICS & GYNECOLOGY

## 2017-07-19 PROCEDURE — 25000128 H RX IP 250 OP 636: Performed by: NURSE ANESTHETIST, CERTIFIED REGISTERED

## 2017-07-19 PROCEDURE — 37000008 ZZH ANESTHESIA TECHNICAL FEE, 1ST 30 MIN: Performed by: OBSTETRICS & GYNECOLOGY

## 2017-07-19 PROCEDURE — 25000132 ZZH RX MED GY IP 250 OP 250 PS 637: Performed by: ANESTHESIOLOGY

## 2017-07-19 PROCEDURE — 25000128 H RX IP 250 OP 636

## 2017-07-19 PROCEDURE — 85018 HEMOGLOBIN: CPT | Performed by: OBSTETRICS & GYNECOLOGY

## 2017-07-19 PROCEDURE — 36000087 ZZH SURGERY LEVEL 8 EA 15 ADDTL MIN: Performed by: OBSTETRICS & GYNECOLOGY

## 2017-07-19 PROCEDURE — 36415 COLL VENOUS BLD VENIPUNCTURE: CPT | Performed by: OBSTETRICS & GYNECOLOGY

## 2017-07-19 PROCEDURE — 25000132 ZZH RX MED GY IP 250 OP 250 PS 637: Performed by: OBSTETRICS & GYNECOLOGY

## 2017-07-19 RX ORDER — NEOSTIGMINE METHYLSULFATE 1 MG/ML
VIAL (ML) INJECTION PRN
Status: DISCONTINUED | OUTPATIENT
Start: 2017-07-19 | End: 2017-07-19

## 2017-07-19 RX ORDER — SODIUM CHLORIDE, SODIUM LACTATE, POTASSIUM CHLORIDE, CALCIUM CHLORIDE 600; 310; 30; 20 MG/100ML; MG/100ML; MG/100ML; MG/100ML
INJECTION, SOLUTION INTRAVENOUS CONTINUOUS
Status: DISCONTINUED | OUTPATIENT
Start: 2017-07-19 | End: 2017-07-20 | Stop reason: HOSPADM

## 2017-07-19 RX ORDER — FENTANYL CITRATE 50 UG/ML
25-50 INJECTION, SOLUTION INTRAMUSCULAR; INTRAVENOUS
Status: DISCONTINUED | OUTPATIENT
Start: 2017-07-19 | End: 2017-07-19 | Stop reason: HOSPADM

## 2017-07-19 RX ORDER — ONDANSETRON 4 MG/1
4 TABLET, ORALLY DISINTEGRATING ORAL EVERY 6 HOURS PRN
Status: DISCONTINUED | OUTPATIENT
Start: 2017-07-19 | End: 2017-07-20 | Stop reason: HOSPADM

## 2017-07-19 RX ORDER — ONDANSETRON 2 MG/ML
INJECTION INTRAMUSCULAR; INTRAVENOUS PRN
Status: DISCONTINUED | OUTPATIENT
Start: 2017-07-19 | End: 2017-07-19

## 2017-07-19 RX ORDER — HYDROMORPHONE HYDROCHLORIDE 1 MG/ML
.3-.5 INJECTION, SOLUTION INTRAMUSCULAR; INTRAVENOUS; SUBCUTANEOUS EVERY 10 MIN PRN
Status: DISCONTINUED | OUTPATIENT
Start: 2017-07-19 | End: 2017-07-19 | Stop reason: HOSPADM

## 2017-07-19 RX ORDER — PROMETHAZINE HYDROCHLORIDE 25 MG/1
25 TABLET ORAL DAILY PRN
COMMUNITY
End: 2018-01-26

## 2017-07-19 RX ORDER — MEPERIDINE HYDROCHLORIDE 25 MG/ML
12.5 INJECTION INTRAMUSCULAR; INTRAVENOUS; SUBCUTANEOUS
Status: DISCONTINUED | OUTPATIENT
Start: 2017-07-19 | End: 2017-07-19 | Stop reason: HOSPADM

## 2017-07-19 RX ORDER — ALUMINA, MAGNESIA, AND SIMETHICONE 2400; 2400; 240 MG/30ML; MG/30ML; MG/30ML
15-30 SUSPENSION ORAL EVERY 4 HOURS PRN
Status: DISCONTINUED | OUTPATIENT
Start: 2017-07-19 | End: 2017-07-20 | Stop reason: HOSPADM

## 2017-07-19 RX ORDER — HYDROXYZINE HYDROCHLORIDE 25 MG/1
25 TABLET, FILM COATED ORAL EVERY 6 HOURS PRN
Qty: 30 TABLET | Refills: 0 | Status: SHIPPED | OUTPATIENT
Start: 2017-07-19 | End: 2018-05-08

## 2017-07-19 RX ORDER — NALOXONE HYDROCHLORIDE 0.4 MG/ML
.1-.4 INJECTION, SOLUTION INTRAMUSCULAR; INTRAVENOUS; SUBCUTANEOUS
Status: DISCONTINUED | OUTPATIENT
Start: 2017-07-19 | End: 2017-07-20 | Stop reason: HOSPADM

## 2017-07-19 RX ORDER — FENTANYL CITRATE 50 UG/ML
INJECTION, SOLUTION INTRAMUSCULAR; INTRAVENOUS PRN
Status: DISCONTINUED | OUTPATIENT
Start: 2017-07-19 | End: 2017-07-19

## 2017-07-19 RX ORDER — ACETAMINOPHEN 325 MG/1
650 TABLET ORAL EVERY 4 HOURS PRN
Qty: 100 TABLET | Refills: 0 | Status: SHIPPED | OUTPATIENT
Start: 2017-07-19 | End: 2017-08-05

## 2017-07-19 RX ORDER — SODIUM CHLORIDE, SODIUM LACTATE, POTASSIUM CHLORIDE, CALCIUM CHLORIDE 600; 310; 30; 20 MG/100ML; MG/100ML; MG/100ML; MG/100ML
INJECTION, SOLUTION INTRAVENOUS CONTINUOUS
Status: DISCONTINUED | OUTPATIENT
Start: 2017-07-19 | End: 2017-07-19 | Stop reason: HOSPADM

## 2017-07-19 RX ORDER — MAGNESIUM HYDROXIDE 1200 MG/15ML
LIQUID ORAL PRN
Status: DISCONTINUED | OUTPATIENT
Start: 2017-07-19 | End: 2017-07-19 | Stop reason: HOSPADM

## 2017-07-19 RX ORDER — CEFAZOLIN SODIUM 1 G/3ML
INJECTION, POWDER, FOR SOLUTION INTRAMUSCULAR; INTRAVENOUS PRN
Status: DISCONTINUED | OUTPATIENT
Start: 2017-07-19 | End: 2017-07-19

## 2017-07-19 RX ORDER — ONDANSETRON 4 MG/1
4 TABLET, ORALLY DISINTEGRATING ORAL EVERY 30 MIN PRN
Status: DISCONTINUED | OUTPATIENT
Start: 2017-07-19 | End: 2017-07-19 | Stop reason: HOSPADM

## 2017-07-19 RX ORDER — PROCHLORPERAZINE MALEATE 5 MG
5-10 TABLET ORAL EVERY 6 HOURS PRN
Status: DISCONTINUED | OUTPATIENT
Start: 2017-07-19 | End: 2017-07-20 | Stop reason: HOSPADM

## 2017-07-19 RX ORDER — PROPOFOL 10 MG/ML
INJECTION, EMULSION INTRAVENOUS PRN
Status: DISCONTINUED | OUTPATIENT
Start: 2017-07-19 | End: 2017-07-19

## 2017-07-19 RX ORDER — KETOROLAC TROMETHAMINE 30 MG/ML
30 INJECTION, SOLUTION INTRAMUSCULAR; INTRAVENOUS EVERY 6 HOURS
Status: DISCONTINUED | OUTPATIENT
Start: 2017-07-19 | End: 2017-07-20 | Stop reason: HOSPADM

## 2017-07-19 RX ORDER — OXYCODONE HYDROCHLORIDE 5 MG/1
5-10 TABLET ORAL
Status: DISCONTINUED | OUTPATIENT
Start: 2017-07-19 | End: 2017-07-20 | Stop reason: HOSPADM

## 2017-07-19 RX ORDER — OXYCODONE HYDROCHLORIDE 5 MG/1
5-10 TABLET ORAL
Qty: 75 TABLET | Refills: 0 | Status: SHIPPED | OUTPATIENT
Start: 2017-07-19 | End: 2017-08-05

## 2017-07-19 RX ORDER — BUPIVACAINE HYDROCHLORIDE AND EPINEPHRINE 2.5; 5 MG/ML; UG/ML
INJECTION, SOLUTION INFILTRATION; PERINEURAL PRN
Status: DISCONTINUED | OUTPATIENT
Start: 2017-07-19 | End: 2017-07-19 | Stop reason: HOSPADM

## 2017-07-19 RX ORDER — LIDOCAINE HYDROCHLORIDE 20 MG/ML
INJECTION, SOLUTION INFILTRATION; PERINEURAL PRN
Status: DISCONTINUED | OUTPATIENT
Start: 2017-07-19 | End: 2017-07-19

## 2017-07-19 RX ORDER — PROPOFOL 10 MG/ML
INJECTION, EMULSION INTRAVENOUS CONTINUOUS PRN
Status: DISCONTINUED | OUTPATIENT
Start: 2017-07-19 | End: 2017-07-19

## 2017-07-19 RX ORDER — IBUPROFEN 600 MG/1
600 TABLET, FILM COATED ORAL EVERY 6 HOURS PRN
Status: DISCONTINUED | OUTPATIENT
Start: 2017-07-20 | End: 2017-07-20 | Stop reason: HOSPADM

## 2017-07-19 RX ORDER — GLYCOPYRROLATE 0.2 MG/ML
INJECTION, SOLUTION INTRAMUSCULAR; INTRAVENOUS PRN
Status: DISCONTINUED | OUTPATIENT
Start: 2017-07-19 | End: 2017-07-19

## 2017-07-19 RX ORDER — ACETAMINOPHEN 325 MG/1
975 TABLET ORAL ONCE
Status: COMPLETED | OUTPATIENT
Start: 2017-07-19 | End: 2017-07-19

## 2017-07-19 RX ORDER — SODIUM CHLORIDE, SODIUM LACTATE, POTASSIUM CHLORIDE, CALCIUM CHLORIDE 600; 310; 30; 20 MG/100ML; MG/100ML; MG/100ML; MG/100ML
INJECTION, SOLUTION INTRAVENOUS CONTINUOUS PRN
Status: DISCONTINUED | OUTPATIENT
Start: 2017-07-19 | End: 2017-07-19

## 2017-07-19 RX ORDER — IBUPROFEN 600 MG/1
600 TABLET, FILM COATED ORAL EVERY 6 HOURS PRN
Qty: 50 TABLET | Refills: 0 | Status: SHIPPED | OUTPATIENT
Start: 2017-07-19 | End: 2017-07-20

## 2017-07-19 RX ORDER — ONDANSETRON 2 MG/ML
4 INJECTION INTRAMUSCULAR; INTRAVENOUS EVERY 6 HOURS PRN
Status: DISCONTINUED | OUTPATIENT
Start: 2017-07-19 | End: 2017-07-20 | Stop reason: HOSPADM

## 2017-07-19 RX ORDER — NALOXONE HYDROCHLORIDE 0.4 MG/ML
.1-.4 INJECTION, SOLUTION INTRAMUSCULAR; INTRAVENOUS; SUBCUTANEOUS
Status: DISCONTINUED | OUTPATIENT
Start: 2017-07-19 | End: 2017-07-19 | Stop reason: HOSPADM

## 2017-07-19 RX ORDER — ONDANSETRON 2 MG/ML
4 INJECTION INTRAMUSCULAR; INTRAVENOUS EVERY 30 MIN PRN
Status: DISCONTINUED | OUTPATIENT
Start: 2017-07-19 | End: 2017-07-19 | Stop reason: HOSPADM

## 2017-07-19 RX ADMIN — FENTANYL CITRATE 50 MCG: 50 INJECTION, SOLUTION INTRAMUSCULAR; INTRAVENOUS at 15:35

## 2017-07-19 RX ADMIN — NEOSTIGMINE METHYLSULFATE 3 MG: 1 INJECTION INTRAMUSCULAR; INTRAVENOUS; SUBCUTANEOUS at 13:50

## 2017-07-19 RX ADMIN — GLYCOPYRROLATE 0.4 MG: 0.2 INJECTION, SOLUTION INTRAMUSCULAR; INTRAVENOUS at 13:50

## 2017-07-19 RX ADMIN — LIDOCAINE HYDROCHLORIDE 100 MG: 20 INJECTION, SOLUTION INFILTRATION; PERINEURAL at 12:07

## 2017-07-19 RX ADMIN — SODIUM CHLORIDE, POTASSIUM CHLORIDE, SODIUM LACTATE AND CALCIUM CHLORIDE: 600; 310; 30; 20 INJECTION, SOLUTION INTRAVENOUS at 13:00

## 2017-07-19 RX ADMIN — PROPOFOL 200 MG: 10 INJECTION, EMULSION INTRAVENOUS at 12:07

## 2017-07-19 RX ADMIN — ROCURONIUM BROMIDE 40 MG: 10 INJECTION INTRAVENOUS at 12:07

## 2017-07-19 RX ADMIN — ONDANSETRON 4 MG: 2 SOLUTION INTRAMUSCULAR; INTRAVENOUS at 18:04

## 2017-07-19 RX ADMIN — PROPOFOL 40 MCG/KG/MIN: 10 INJECTION, EMULSION INTRAVENOUS at 12:20

## 2017-07-19 RX ADMIN — KETOROLAC TROMETHAMINE 30 MG: 30 INJECTION, SOLUTION INTRAMUSCULAR at 18:04

## 2017-07-19 RX ADMIN — OXYCODONE HYDROCHLORIDE 10 MG: 5 TABLET ORAL at 18:54

## 2017-07-19 RX ADMIN — ONDANSETRON 4 MG: 2 INJECTION INTRAMUSCULAR; INTRAVENOUS at 12:50

## 2017-07-19 RX ADMIN — HYDROMORPHONE HYDROCHLORIDE 1 MG: 1 INJECTION, SOLUTION INTRAMUSCULAR; INTRAVENOUS; SUBCUTANEOUS at 17:20

## 2017-07-19 RX ADMIN — FENTANYL CITRATE 50 MCG: 50 INJECTION, SOLUTION INTRAMUSCULAR; INTRAVENOUS at 15:26

## 2017-07-19 RX ADMIN — ONDANSETRON 4 MG: 2 SOLUTION INTRAMUSCULAR; INTRAVENOUS at 15:23

## 2017-07-19 RX ADMIN — CEFAZOLIN 2 G: 1 INJECTION, POWDER, FOR SOLUTION INTRAMUSCULAR; INTRAVENOUS at 12:15

## 2017-07-19 RX ADMIN — SODIUM CHLORIDE 1000 ML: 900 IRRIGANT IRRIGATION at 12:39

## 2017-07-19 RX ADMIN — SODIUM CHLORIDE 1000 ML: 0.9 IRRIGANT IRRIGATION at 12:39

## 2017-07-19 RX ADMIN — ROCURONIUM BROMIDE 5 MG: 10 INJECTION INTRAVENOUS at 13:12

## 2017-07-19 RX ADMIN — DEXMEDETOMIDINE HYDROCHLORIDE 4 MCG: 100 INJECTION, SOLUTION INTRAVENOUS at 13:53

## 2017-07-19 RX ADMIN — DEXMEDETOMIDINE HYDROCHLORIDE 4 MCG: 100 INJECTION, SOLUTION INTRAVENOUS at 13:51

## 2017-07-19 RX ADMIN — ROCURONIUM BROMIDE 5 MG: 10 INJECTION INTRAVENOUS at 13:24

## 2017-07-19 RX ADMIN — HYDROMORPHONE HYDROCHLORIDE 1 MG: 1 INJECTION, SOLUTION INTRAMUSCULAR; INTRAVENOUS; SUBCUTANEOUS at 20:26

## 2017-07-19 RX ADMIN — ACETAMINOPHEN 975 MG: 325 TABLET, FILM COATED ORAL at 10:40

## 2017-07-19 RX ADMIN — SODIUM CHLORIDE, POTASSIUM CHLORIDE, SODIUM LACTATE AND CALCIUM CHLORIDE: 600; 310; 30; 20 INJECTION, SOLUTION INTRAVENOUS at 16:15

## 2017-07-19 RX ADMIN — MIDAZOLAM HYDROCHLORIDE 2 MG: 1 INJECTION, SOLUTION INTRAMUSCULAR; INTRAVENOUS at 12:04

## 2017-07-19 RX ADMIN — FENTANYL CITRATE 100 MCG: 50 INJECTION, SOLUTION INTRAMUSCULAR; INTRAVENOUS at 12:07

## 2017-07-19 RX ADMIN — FENTANYL CITRATE 50 MCG: 50 INJECTION, SOLUTION INTRAMUSCULAR; INTRAVENOUS at 15:57

## 2017-07-19 RX ADMIN — RANITIDINE 150 MG: 150 TABLET ORAL at 19:43

## 2017-07-19 RX ADMIN — BUPIVACAINE HYDROCHLORIDE AND EPINEPHRINE BITARTRATE 30 ML: 2.5; .005 INJECTION, SOLUTION INFILTRATION; PERINEURAL at 12:39

## 2017-07-19 RX ADMIN — SODIUM CHLORIDE, POTASSIUM CHLORIDE, SODIUM LACTATE AND CALCIUM CHLORIDE: 600; 310; 30; 20 INJECTION, SOLUTION INTRAVENOUS at 12:02

## 2017-07-19 ASSESSMENT — LIFESTYLE VARIABLES: TOBACCO_USE: 0

## 2017-07-19 ASSESSMENT — ENCOUNTER SYMPTOMS: SEIZURES: 0

## 2017-07-19 NOTE — ANESTHESIA POSTPROCEDURE EVALUATION
Patient: Julia Smith    Procedure(s):  DAVINCI PELVISCOPY WITH RIGHT OOPHORECTOMY  - Wound Class: I-Clean   - Wound Class: I-Clean    Diagnosis:PELVIC PAIN  Diagnosis Additional Information: No value filed.    Anesthesia Type:  General    Note:  Anesthesia Post Evaluation    Patient location during evaluation: PACU  Patient participation: Able to fully participate in evaluation  Level of consciousness: awake  Pain management: adequate  Airway patency: patent  Cardiovascular status: acceptable  Hydration status: acceptable  PONV: none     Anesthetic complications: None          Last vitals:  Vitals:    07/19/17 1515 07/19/17 1530 07/19/17 1538   BP: 116/74 107/74    Resp: 17 16 9   Temp: 36.4  C (97.6  F)     SpO2: 100% 96% 96%         Electronically Signed By: Abiel Diaz MD  July 19, 2017  3:44 PM

## 2017-07-19 NOTE — OP NOTE
Pre-operative diagnosis: PELVIC PAIN   Post-operative diagnosis Same   Procedure:   DAVINCI PELVISCOPY WITH RIGHT OOPHORECTOMY  Class:   Right ureterolysis  Lysis of adhesions  Excision of probable endometriosis  Cystoscopy   Surgeon(s): Surgeon(s) and Role:     * Justin Wells MD - Primary     * Skylar Diaz PA-C - Assisting   Estimated blood loss: 25 mL        Findings: Right ovary was densely adherent to right aspect of vaginal cuff  Fibrotic nodular tissue present in right pelvic side wall which required ureterolysis for safe excision  Fibrotic peritoneum over left pelvic side wall   Red/clear type lesions over peritoneum of anterior cul de sac  Normal cystoscopy with brisk flow of urine and no evidence of injury or IC.          Complications none  Indication for surgery.   Patient is a 32 year old female with a long history of endoemtriosis and pelvic pain. She has noted worsening pain on her right , where her remaining ovary is still present. US have shown ovarian cysts on that side as well. Given this, the decision was finally made to proceed with laprosocpic evaluation and removal of right ovary .     Operative procedure.    After administration of anesthesia, the patient was prepped and draped in the normal sterile fashion. A sponge stick was placed into the vagina to be able to elevate the vaginal cuff. A greene catheter was placed. I then moved to the abdominal portion of the procedure. I made a small 5mm incision in the left upper quadrant and inserted a 5mm optiview trocar to its correct intraperitoneal position. I pneumoperitoneum was established. We then placed the remainder of our ports under direct visualization, a 8mm davinci trocar in the right and left lower quadrants, a 8mm assistant port in the RUQ and a 8 mm camera port placed in the umbilicus.The patient was then placed into steep trendelenburg and the DaVinci robot brought up and docked without complications. I scrubbed out  and moved to the consel.  I began by evaluating the pelvis as previously mentioned. Directed my attension to the remaining right ovary which was densely adherent to the vaginal cuff. Using minimal use of electrocautery and careful traction /counter traction, I gently excised the adhesions of the ovary to the cuff. When this was completed, the more proximal aspect of the ovary was still densely adherent to the right pelvic sidewall over where I expected the ureter would run. I therefore decided to proceed with ureterolysis so as to ensure the ureter would stay out of harms way as I performed my disection. I identified the ureter as it crossed the pelvic brim on the right. I entered retroperitoneally superior and lateral to this. I then began to gently tent the peritoneum off over the ureter as it made its way along the right pelvic sidewall and directly under the ovary. When the ureter had been completely dissected I was then able to complete my lysis of the ovarian adhesions on the right and finally to cauterize and cut throught the infundibulopelvic ligament. The ovary was then removed out of my RUQ assistant port. I directed my attention back to the fibrotic dense tissue in the deep pelvic sidewall and decided to go ahead and carefully excise this as I was concerned about a deep endometriosis nodule. When this was completed, I moved to the left. Again , there was fibroitic type peritoneum which was noted on the left side wall. I carefully entered retroperitoneally and began to gently excise this tissue, finally remove the whole of it. It was then removed from the abdomen for pathology. There were some red/clear type lesions present over the anterior cul de sac peritoneum and I excised this carefully with my scissor and this will also be sent to pathology. There was peritoneal oozing from the deep excision sites so I placed some surgiflo in the deep sites then sprayed josefa over the more superficial. I then placed  two sheets of interceed.. The pneumoperitoneum was evacuated. Trocars removed and the skin closed with 4-0 vicryl in a subcuticular fashion.I scrubbed back in and removed the catheter and inserted the cystoscope. WHen this was completed the surgery was over.  A debriefing was performed, specimen identified and the patient recalled from anesthesia without difficulty.       Justin Wells M.D.

## 2017-07-19 NOTE — ANESTHESIA PREPROCEDURE EVALUATION
"Procedure: Procedure(s):  DAVINCI PELVIC PROCEDURE  DAVINCI OOPHORECTOMY  Preop diagnosis: PELVIC PAIN  Allergies   Allergen Reactions     Decadron [Dexamethasone] Other (See Comments)     Muscle cramps  No problem with prednisone     Depakote [Divalproex Sodium]      \"slows vision  - Weird reaction\"     Ibuprofen Nausea and Vomiting     Tramadol Hives and Other (See Comments)     Causes adverse reaction with other medications that pt takes     Valproic Acid Rash     (Divalproex)     Patient Active Problem List   Diagnosis     Fibromyalgia     PCOS (polycystic ovarian syndrome)     Constipation     Health Care Home     Anxiety     Endometriosis     Pain in joint, pelvic region and thigh     Bipolar 2 disorder (H)     Blood type A-     Chronic pain disorder     Depression     Glucose intolerance (impaired glucose tolerance)     Adopted     Tobacco abuse  2011- 5-15 @ 1/4 ppd= 1 pk yr hx      Proteinuria     Excessive daytime sleepiness     Syncope     Gastroesophageal reflux disease without esophagitis     Pelvic pain in female     Seroma, postoperative     Past Medical History:   Diagnosis Date     Agoraphobia      Anxiety, generalized      Bipolar 2 disorder (H) 12/17/2013    eleno and inocente - shroeder     Endometriosis     confirmed by lap. [facility] [place]     Fibromyalgia      Gastro-oesophageal reflux disease      Migraine      OCD (obsessive compulsive disorder)      Other chronic pain     PELVIC     Panic attacks      PCOS (polycystic ovarian syndrome)      PTSD (post-traumatic stress disorder)      TMJ (dislocation of temporomandibular joint)      Past Surgical History:   Procedure Laterality Date     COLONOSCOPY N/A 8/22/2014    Procedure: COLONOSCOPY;  Surgeon: Shannon Solis MD;  Location:  GI     CYSTOSCOPY N/A 9/3/2014    Procedure: CYSTOSCOPY;  Surgeon: Justin Wells MD;  Location:  OR     CYSTOSCOPY N/A 8/3/2016    Procedure: CYSTOSCOPY;  Surgeon: Jsutin Wells MD;  " Location: [facility] OR     DAVINCI ASSISTED ABLATION / EXCISION OF ENDOMETRIOSIS N/A 8/3/2016    Procedure: DAVINCI ASSISTED ABLATION / EXCISION OF ENDOMETRIOSIS;  Surgeon: Justin Wells MD;  Location: [facility] OR     DAVINCI HYSTERECTOMY TOTAL N/A 8/3/2016    Procedure: DAVINCI HYSTERECTOMY TOTAL;  Surgeon: Justin Wells MD;  Location: [facility] OR     DAVINCI PELVIC PROCEDURE  9/5/2012    Procedure: DAVINCI PELVIC PROCEDURE;  Diagnostic Laparoscopy, Robotic Assisted Endometrial Resection, Tubal dye study ;  Surgeon: Johanny Velásquez DO;  Location: [facility] OR     DAVINCI PELVIC PROCEDURE N/A 9/3/2014    Procedure: DAVINCI PELVIC PROCEDURE;  Surgeon: Justin Wells MD;  Location: [facility] OR     DAVINCI SALPINGECTOMY Right 8/3/2016    Procedure: DAVINCI SALPINGECTOMY;  Surgeon: Justin Wells MD;  Location: [facility] OR     DAVINCI SALPINGO-OOPHORECTOMY INCLUDING BILATERAL Left 8/3/2016    Procedure: DAVINCI SALPINGO-OOPHORECTOMY INCLUDING BILATERAL;  Surgeon: Justin Wells MD;  Location:  OR     DILATION AND CURETTAGE SUCTION WITH ULTRASOUND GUIDANCE  6/24/2014    Procedure: DILATION AND CURETTAGE SUCTION WITH ULTRASOUND GUIDANCE;  Surgeon: Johanny Velásquez DO;  Location:  OR     DILATION AND CURETTAGE, HYSTEROSCOPY DIAGNOSTIC, COMBINED N/A 9/3/2014    Procedure: COMBINED DILATION AND CURETTAGE, HYSTEROSCOPY DIAGNOSTIC;  Surgeon: Justin Wells MD;  Location: [facility] OR     HCL PAP SMEAR  12/2006    WNL     LAPAROSCOPIC APPENDECTOMY N/A 9/3/2014    Procedure: LAPAROSCOPIC APPENDECTOMY;  Surgeon: Ángel Duffy MD;  Location: [facility] OR     LAPAROSCOPY DIAGNOSTIC (GYN)  9/5/2012    Procedure: LAPAROSCOPY DIAGNOSTIC (GYN);;  Surgeon: Johanny Velásquez DO;  Location: [facility] OR       No current facility-administered medications on file prior to encounter.   Current Outpatient Prescriptions on File Prior to Encounter:  Amphetamine-Dextroamphetamine (ADDERALL PO) Take 5 mg by mouth daily   "    /69 (Cuff Size: Adult Regular)  Temp 36.5  C (97.7  F) (Oral)  Resp 18  Ht 1.626 m (5' 4\")  Wt 63.5 kg (140 lb)  LMP 01/03/2014  SpO2 97%  BMI 24.03 kg/m2    Lab Results   Component Value Date    WBC 10.0 07/07/2017     Lab Results   Component Value Date    RBC 4.53 07/07/2017     Lab Results   Component Value Date    HGB 13.6 07/19/2017     Lab Results   Component Value Date    HCT 39.7 07/07/2017     Lab Results   Component Value Date    MCV 88 07/07/2017     Lab Results   Component Value Date    MCH 30.9 07/07/2017     Lab Results   Component Value Date    MCHC 35.3 07/07/2017     Lab Results   Component Value Date    RDW 13.7 07/07/2017     Lab Results   Component Value Date     07/07/2017     Lab Results   Component Value Date    INR 1.01 08/14/2016       Last Basic Metabolic Panel:  Lab Results   Component Value Date     07/07/2017      Lab Results   Component Value Date    POTASSIUM 3.6 07/07/2017     Lab Results   Component Value Date    CHLORIDE 106 07/07/2017     Lab Results   Component Value Date    EFRAÍN 8.8 07/07/2017     Lab Results   Component Value Date    CO2 27 07/07/2017     Lab Results   Component Value Date    BUN 7 07/07/2017     Lab Results   Component Value Date    CR 0.62 07/07/2017     Lab Results   Component Value Date    GLC 81 07/07/2017     Anesthesia Evaluation     . Pt has had prior anesthetic.     History of anesthetic complications (slow to wake up)          ROS/MED HX    ENT/Pulmonary:      (-) tobacco use, sleep apnea and recent URI   Neurologic:     (+)migraines,    (-) seizures and CVA   Cardiovascular:     (+) ----. : . . fainting (syncope). :. .       METS/Exercise Tolerance:  >4 METS   Hematologic:  - neg hematologic  ROS       Musculoskeletal: Comment: Fibromyalgia  TMJ symptoms        GI/Hepatic:     (+) GERD       Renal/Genitourinary: Comment: PCOS, Pelvic Pain, Endometriosis        Endo:      (-) Type II DM and thyroid disease   Psychiatric: " Comment: PTSD  Panic disorder  OCD    (+) psychiatric history anxiety, bipolar and other (comment)      Infectious Disease:  - neg infectious disease ROS       Malignancy:         Other: Comment: Pelvic pain  Endometriosis  Polycystic ovarian syndrome   (+) H/O Chronic Pain,H/O chronic opiod use ,                    Physical Exam  Normal systems: cardiovascular, pulmonary and dental    Airway   Mallampati: II  TM distance: >3 FB  Neck ROM: full    Dental     Cardiovascular   Rhythm and rate: regular and normal      Pulmonary    breath sounds clear to auscultation                    Anesthesia Plan      History & Physical Review  History and physical reviewed and following examination; no interval change.    ASA Status:  2 .    NPO Status:  > 8 hours    Plan for General with Propofol induction. Maintenance will be TIVA.    PONV prophylaxis:  Ondansetron (or other 5HT-3)  NO DECADRON    Propofol infusion  Benadryl 12.5 mg      Postoperative Care      Consents  Anesthetic plan, risks, benefits and alternatives discussed with:  Patient..                          .

## 2017-07-19 NOTE — BRIEF OP NOTE
[facility] Brief Operative Note    Pre-operative diagnosis: PELVIC PAIN   Post-operative diagnosis Same   Procedure: Procedure(s):  DAVINCI PELVISCOPY WITH RIGHT OOPHORECTOMY  - Wound Class: I-Clean   - Wound Class: I-Clean   Right ureterolysis  Lysis of adhesions  Excision of probable endometriosis  Cystoscopy   Surgeon(s): Surgeon(s) and Role:     * Justin Wells MD - Primary     * Skylar Diaz PA-C - Assisting   Estimated blood loss: 25 mL    Specimens:   ID Type Source Tests Collected by Time Destination   A : RIGHT OVARY Tissue Ovary, Right SURGICAL PATHOLOGY EXAM Justin Wells MD 7/19/2017 12:45 PM    B : INTERIOR CUL DE SAC Tissue Other SURGICAL PATHOLOGY EXAM Justin Wells MD 7/19/2017 12:52 PM    C : RIGHT PELVIC SIDEWALL Tissue Pelvis, Right SURGICAL PATHOLOGY EXAM Justin Wells MD 7/19/2017 12:54 PM    D : LEFT PELVIC SIDEWALL Tissue Other SURGICAL PATHOLOGY EXAM Justin Wells MD 7/19/2017  1:37 PM       Findings: Right ovary was densely adherent to right aspect of vaginal cuff  Fibrotic nodular tissue present in right pelvic side wall which required ureterolysis for safe excision  Fibrotic peritoneum over left pelvic side wall   Red/clear type lesions over peritoneum of anterior cul de sac  Normal cystoscopy with brisk flow of urine and no evidence of injury or IC.

## 2017-07-19 NOTE — IP AVS SNAPSHOT
[facility]    [address] [place] [address]    Phone:  [phone]                                       After Visit Summary   7/19/2017    Julia Smith    MRN: [id number]           After Visit Summary Signature Page     I have received my discharge instructions, and my questions have been answered. I have discussed any challenges I see with this plan with the nurse or doctor.    ..........................................................................................................................................  Patient/Patient Representative Signature      ..........................................................................................................................................  Patient Representative Print Name and Relationship to Patient    ..................................................               ................................................  Date                                            Time    ..........................................................................................................................................  Reviewed by Signature/Title    ...................................................              ..............................................  Date                                                            Time

## 2017-07-19 NOTE — DISCHARGE INSTRUCTIONS
While you were at the hospital today you were given 975 mg of Tylenol. The recommended daily maximum dose is 4000 mg.

## 2017-07-19 NOTE — PROGRESS NOTES
Admission medication history interview status for the 7/19/2017  admission is complete. See EPIC admission navigator for prior to admission medications     Medication history source reliability:Good    Medication history interview source(s):Patient    Medication history resources (including written lists, pill bottles, clinic record):None    Primary pharmacy. [facility]    Additional medication history information not noted on PTA med list :None    Time spent in this activity:  30 minutes    Prior to Admission medications    Medication Sig Last Dose Taking? Auth Provider   CLONAZEPAM PO Take 1 mg by mouth At Bedtime 7/18/2017 at 2200 Yes Reported, Patient   METHOCARBAMOL PO Take 750 mg by mouth daily as needed for muscle spasms Past Month at Unknown time Yes Reported, Patient   promethazine (PHENERGAN) 25 MG tablet Take 25 mg by mouth daily as needed for nausea 7/18/2017 at 1600 Yes Reported, Patient   SERTRALINE HCL PO Take 100 mg by mouth At Bedtime 7/18/2017 at 2200 Yes Reported, Patient   norethindrone-ethinyl estradiol (ORTHO-NOVUM 1-35 TAB,NORTREL 1-35 TAB) 1-35 MG-MCG per tablet Take 1 tablet by mouth daily 7/19/2017 at 0700 Yes Reported, Patient   LamoTRIgine (LAMICTAL PO) Take 100 mg by mouth 2 times daily 7/19/2017 at 0700 Yes Reported, Patient   OXYCODONE HCL PO Take 5 mg by mouth 4 times daily as needed  7/19/2017 at 0700 Yes Reported, Patient   butalbital-acetaminophen-caffeine (FIORICET/ESGIC) -40 MG per tablet Take 1 tablet by mouth 3 times daily as needed for headaches  7/18/2017 at 1600 Yes Reported, Patient   Amphetamine-Dextroamphetamine (ADDERALL PO) Take 5 mg by mouth daily  7/12/2017 at AM Yes Reported, Patient

## 2017-07-19 NOTE — PLAN OF CARE
Problem: Goal Outcome Summary  Goal: Goal Outcome Summary  Outcome: No Change  Incisions on abdomen covered with steri strips. CDI. Hypoactive BS. Denies passing flatus. Pain managed with iv dilaudid, scheduled toradol, po oxycodone. C/o nausea. zofran x1 given. Afebrile. Vss. Due to void. Sister at bedside. Will continue to monitor.

## 2017-07-19 NOTE — ANESTHESIA CARE TRANSFER NOTE
Patient: Julia Smith    Procedure(s):  DAVINCI PELVISCOPY WITH RIGHT OOPHORECTOMY  - Wound Class: I-Clean   - Wound Class: I-Clean    Diagnosis: PELVIC PAIN  Diagnosis Additional Information: No value filed.    Anesthesia Type:   General     Note:  Airway :Face Mask  Patient transferred to:PACU  Comments: To [facility] PACU: Arouses easily, good airway, 02 face mask, VSS  Report to RN      Vitals: (Last set prior to Anesthesia Care Transfer)    CRNA VITALS  7/19/2017 1358 - 7/19/2017 1435      7/19/2017             Pulse: 65    Ht Rate: 65    SpO2: 100 %    Resp Rate (set): 10                Electronically Signed By: POONAM Sloan CRNA  July 19, 2017  2:35 PM

## 2017-07-19 NOTE — IP AVS SNAPSHOT
MRN:[id number]                      After Visit Summary   7/19/2017    Julia Smith    MRN: [id number]           Thank you!     Thank you for choosing [facility] for your care. Our goal is always to provide you with excellent care. Hearing back from our patients is one way we can continue to improve our services. Please take a few minutes to complete the written survey that you may receive in the mail after you visit with us. Thank you!        Patient Information     Date Of Birth          1985        About your hospital stay     You were admitted on:  July 19, 2017 [facility]    You were discharged on:  July 20, 2017       Who to Call     For medical emergencies, please call 911.  For non-urgent questions about your medical care, please call your primary care provider or clinic, None  For questions related to your surgery, please call your surgery clinic        Attending Provider     Provider Justin Sylvester MD OB/Gyn       Primary Care Provider    Physician No Ref-Primary      After Care Instructions     Diet Instructions       Resume pre-procedure diet            Discharge Instructions       Patient to follow up with appointment in 2 weeks            No Alcohol       For 24 hours post procedure            No driving or operating machinery        until the day after procedure            No lifting        No lifting over 25  lbs and no strenuous physical activity for2 weeks            Shower       No shower for 24 hours post procedure. May shower Postoperative Day (POD)  1                  Further instructions from your care team       While you were at the hospital today you were given 975 mg of Tylenol. The recommended daily maximum dose is 4000 mg.     Pending Results     Date and Time Order Name Status Description    7/19/2017 1245 Surgical pathology exam In process             Statement of Approval     Ordered        "   07/20/17 0956  I have reviewed and agree with all the recommendations and orders detailed in this document.  EFFECTIVE NOW     Approved and electronically signed by:  Justin Wells MD             Admission Information     Date & Time Provider Department Dept. Phone    7/19/2017 Justin Wells MD [facility] [phone]      Your Vitals Were     Blood Pressure Temperature Respirations Height Weight Last Period    102/63 (BP Location: Right arm) 99.7  F (37.6  C) (Oral) 16 1.626 m (5' 4\") 68.3 kg (150 lb 9.6 oz) 01/03/2014    Pulse Oximetry BMI (Body Mass Index)                94% 25.85 kg/m2          [employer] Information     [employer] gives you secure access to your electronic health record. If you see a primary care provider, you can also send messages to your care team and make appointments. If you have questions, please call your primary care clinic.  If you do not have a primary care provider, please call [phone] and they will assist you.        Care EveryWhere ID     This is your Care EveryWhere ID. This could be used by other organizations to access your [facility] medical records  [id number]        Equal Access to Services     CHICHI HUGHES AH: Marsha Perrin, john sanders, adam lynch, shimon thomas. So [facility] [phone].    ATENCIÓN: Si habla español, tiene a smith disposición servicios gratuitos de asistencia lingüística. Llame al [phone].    We comply with applicable federal civil rights laws and [place] laws. We do not discriminate on the basis of race, color, national origin, age, disability sex, sexual orientation or gender identity.               Review of your medicines      START taking        Dose / Directions    acetaminophen 325 MG tablet   Commonly known as:  TYLENOL   Used for:  Acute post-operative pain        Dose:  650 mg   Take 2 tablets (650 mg) by mouth every 4 hours as needed for other " (mild pain)   Quantity:  100 tablet   Refills:  0       hydrOXYzine 25 MG tablet   Commonly known as:  ATARAX   Used for:  Acute post-operative pain        Dose:  25 mg   Take 1 tablet (25 mg) by mouth every 6 hours as needed for itching (and nausea and pain)   Quantity:  30 tablet   Refills:  0       naproxen 500 MG tablet   Commonly known as:  NAPROSYN   Used for:  Acute post-operative pain        Dose:  500 mg   Take 1 tablet (500 mg) by mouth 2 times daily as needed for moderate pain   Quantity:  50 tablet   Refills:  1       sulfamethoxazole-trimethoprim 800-160 MG per tablet   Commonly known as:  BACTRIM DS/SEPTRA DS   Used for:  Fever, unspecified        Dose:  1 tablet   Take 1 tablet by mouth 2 times daily   Quantity:  14 tablet   Refills:  0         CONTINUE these medicines which may have CHANGED, or have new prescriptions. If we are uncertain of the size of tablets/capsules you have at home, strength may be listed as something that might have changed.        Dose / Directions    oxyCODONE 5 MG IR tablet   Commonly known as:  ROXICODONE   This may have changed:    - medication strength  - how much to take  - when to take this  - reasons to take this   Used for:  Acute post-operative pain        Dose:  5-10 mg   Take 1-2 tablets (5-10 mg) by mouth every 3 hours as needed for pain or other (Moderate to Severe)   Quantity:  75 tablet   Refills:  0         CONTINUE these medicines which have NOT CHANGED        Dose / Directions    ADDERALL PO        Dose:  5 mg   Take 5 mg by mouth daily   Refills:  0       butalbital-acetaminophen-caffeine -40 MG per tablet   Commonly known as:  FIORICET/ESGIC        Dose:  1 tablet   Take 1 tablet by mouth 3 times daily as needed for headaches   Refills:  0       CLONAZEPAM PO        Dose:  1 mg   Take 1 mg by mouth At Bedtime   Refills:  0       LAMICTAL PO        Dose:  100 mg   Take 100 mg by mouth 2 times daily   Refills:  0       METHOCARBAMOL PO        Dose:  750  mg   Take 750 mg by mouth daily as needed for muscle spasms   Refills:  0       norethindrone-ethinyl estradiol 1-35 MG-MCG per tablet   Commonly known as:  ORTHO-NOVUM 1-35 TAB,NORTREL 1-35 TAB        Dose:  1 tablet   Take 1 tablet by mouth daily   Refills:  0       promethazine 25 MG tablet   Commonly known as:  PHENERGAN        Dose:  25 mg   Take 25 mg by mouth daily as needed for nausea   Refills:  0       SERTRALINE HCL PO        Dose:  100 mg   Take 100 mg by mouth At Bedtime   Refills:  0            Where to get your medicines      These medications were sent to [facility] - [address]  [address], [place] [address]     Phone:  [phone]     acetaminophen 325 MG tablet    hydrOXYzine 25 MG tablet         Some of these will need a paper prescription and others can be bought over the counter. Ask your nurse if you have questions.     Bring a paper prescription for each of these medications     naproxen 500 MG tablet    oxyCODONE 5 MG IR tablet    sulfamethoxazole-trimethoprim 800-160 MG per tablet                Protect others around you: Learn how to safely use, store and throw away your medicines at www.disposemymeds.org.             Medication List: This is a list of all your medications and when to take them. Check marks below indicate your daily home schedule. Keep this list as a reference.      Medications           Morning Afternoon Evening Bedtime As Needed    acetaminophen 325 MG tablet   Commonly known as:  TYLENOL   Take 2 tablets (650 mg) by mouth every 4 hours as needed for other (mild pain)   Last time this was given:  975 mg on 7/19/2017 10:40 AM                            Resume taking as you do at home       ADDERALL PO   Take 5 mg by mouth daily            Today 7/20                       butalbital-acetaminophen-caffeine -40 MG per tablet   Commonly known as:  FIORICET/ESGIC   Take 1 tablet by mouth 3 times daily as needed for  headaches                            Resume taking as you do at home       CLONAZEPAM PO   Take 1 mg by mouth At Bedtime                        Today 7/20           hydrOXYzine 25 MG tablet   Commonly known as:  ATARAX   Take 1 tablet (25 mg) by mouth every 6 hours as needed for itching (and nausea and pain)                            Resume taking as you do at home       LAMICTAL PO   Take 100 mg by mouth 2 times daily            Today 7/20                       METHOCARBAMOL PO   Take 750 mg by mouth daily as needed for muscle spasms                            Resume taking as you do at home       naproxen 500 MG tablet   Commonly known as:  NAPROSYN   Take 1 tablet (500 mg) by mouth 2 times daily as needed for moderate pain                            Received Toradol while in hospital, last at   Reminder to wait at least 6 hours before taking next dose of Naproxen        norethindrone-ethinyl estradiol 1-35 MG-MCG per tablet   Commonly known as:  ORTHO-NOVUM 1-35 TAB,NORTREL 1-35 TAB   Take 1 tablet by mouth daily            Today 7/20                       oxyCODONE 5 MG IR tablet   Commonly known as:  ROXICODONE   Take 1-2 tablets (5-10 mg) by mouth every 3 hours as needed for pain or other (Moderate to Severe)   Last time this was given:  10 mg on 7/20/2017 10:06 AM                Available at 1:15pm today if needed                   promethazine 25 MG tablet   Commonly known as:  PHENERGAN   Take 25 mg by mouth daily as needed for nausea                            Resume taking as you do at home       SERTRALINE HCL PO   Take 100 mg by mouth At Bedtime                        Today 7/20           sulfamethoxazole-trimethoprim 800-160 MG per tablet   Commonly known as:  BACTRIM DS/SEPTRA DS   Take 1 tablet by mouth 2 times daily                Today 7/20

## 2017-07-20 VITALS
DIASTOLIC BLOOD PRESSURE: 63 MMHG | SYSTOLIC BLOOD PRESSURE: 102 MMHG | TEMPERATURE: 99.7 F | WEIGHT: 150.6 LBS | BODY MASS INDEX: 25.71 KG/M2 | OXYGEN SATURATION: 94 % | RESPIRATION RATE: 16 BRPM | HEIGHT: 64 IN

## 2017-07-20 LAB
COPATH REPORT: NORMAL
GLUCOSE BLDC GLUCOMTR-MCNC: 119 MG/DL (ref 70–99)

## 2017-07-20 PROCEDURE — 40000934 ZZH STATISTIC OUTPATIENT (NON-OBS) DAY

## 2017-07-20 PROCEDURE — 82962 GLUCOSE BLOOD TEST: CPT

## 2017-07-20 PROCEDURE — 25000132 ZZH RX MED GY IP 250 OP 250 PS 637: Performed by: OBSTETRICS & GYNECOLOGY

## 2017-07-20 PROCEDURE — 25000128 H RX IP 250 OP 636: Performed by: OBSTETRICS & GYNECOLOGY

## 2017-07-20 RX ORDER — SULFAMETHOXAZOLE/TRIMETHOPRIM 800-160 MG
1 TABLET ORAL 2 TIMES DAILY
Qty: 14 TABLET | Refills: 0 | Status: SHIPPED | OUTPATIENT
Start: 2017-07-20 | End: 2017-08-05

## 2017-07-20 RX ORDER — NAPROXEN 500 MG/1
500 TABLET ORAL 2 TIMES DAILY PRN
Qty: 50 TABLET | Refills: 1 | Status: SHIPPED | OUTPATIENT
Start: 2017-07-20 | End: 2018-05-08

## 2017-07-20 RX ADMIN — HYDROMORPHONE HYDROCHLORIDE 1 MG: 1 INJECTION, SOLUTION INTRAMUSCULAR; INTRAVENOUS; SUBCUTANEOUS at 05:33

## 2017-07-20 RX ADMIN — OXYCODONE HYDROCHLORIDE 10 MG: 5 TABLET ORAL at 04:16

## 2017-07-20 RX ADMIN — KETOROLAC TROMETHAMINE 30 MG: 30 INJECTION, SOLUTION INTRAMUSCULAR at 05:12

## 2017-07-20 RX ADMIN — RANITIDINE 150 MG: 150 TABLET ORAL at 07:53

## 2017-07-20 RX ADMIN — KETOROLAC TROMETHAMINE 30 MG: 30 INJECTION, SOLUTION INTRAMUSCULAR at 00:05

## 2017-07-20 RX ADMIN — OXYCODONE HYDROCHLORIDE 10 MG: 5 TABLET ORAL at 10:06

## 2017-07-20 RX ADMIN — OXYCODONE HYDROCHLORIDE 10 MG: 5 TABLET ORAL at 00:05

## 2017-07-20 RX ADMIN — OXYCODONE HYDROCHLORIDE 10 MG: 5 TABLET ORAL at 07:16

## 2017-07-20 NOTE — PROGRESS NOTES
POD #1.   Stable, VSS , low grade fever  Abdomen. Soft, non distended, minimally tender  Ext. Negative  A: s/p right oophrectomy, MARIAA, excison of endometriosis      Low grade fever, likely atelectasis  P: discharge home. Emphasised the use of incentive spirometer      Follow up in 2 weeks.

## 2017-07-20 NOTE — PLAN OF CARE
Problem: Discharge Planning  Goal: Discharge Planning (Adult, OB, Behavioral, Peds)  Outcome: Adequate for Discharge Date Met:  07/20/17  Patient A&O x4, slightly febrile, 99.2, other VSS on RA, tolerating reg diet, up independently, voiding, IV removed for d/c. Patient c/o R abdominal pain, 8/10, prn Oxy and ice applied with slight stated relief. BS+, CMS+, patient encouraged to use IS. Abdominal lap sites with dermabond, CDI. Patient reports scant vaginal spotting, pads/undergarments provided for d/c.      Pt given discharge instructions. Questions answered. Discharge medications given and reviewed with patient. Follow up appointment to be scheduled in 2 weeks, pt states post-op appointment is already set. Pt to DC home with friend.

## 2017-07-20 NOTE — PLAN OF CARE
Problem: Goal Outcome Summary  Goal: Goal Outcome Summary  Outcome: No Change  A/Ox4. VSS on RA except low grade fever of 100.2. Lap sites CDI. Pain controlled with prn Oxy and scheduled Toradol until this am she needed a bump of Dilaudid after ambulating. Up SBA. No c/o n/v. Voiding adequately. Will continue to monitor.

## 2017-07-29 ENCOUNTER — HOSPITAL ENCOUNTER (EMERGENCY)
Facility: CLINIC | Age: 32
Discharge: HOME | End: 2017-07-29
Attending: EMERGENCY MEDICINE | Admitting: EMERGENCY MEDICINE
Payer: COMMERCIAL

## 2017-07-29 VITALS
TEMPERATURE: 98.2 F | RESPIRATION RATE: 16 BRPM | OXYGEN SATURATION: 98 % | DIASTOLIC BLOOD PRESSURE: 83 MMHG | SYSTOLIC BLOOD PRESSURE: 110 MMHG | HEART RATE: 88 BPM

## 2017-07-29 DIAGNOSIS — R35.0 URINARY FREQUENCY: ICD-10-CM

## 2017-07-29 LAB
ALBUMIN UR-MCNC: 10 MG/DL
APPEARANCE UR: ABNORMAL
BACTERIA #/AREA URNS HPF: ABNORMAL /HPF
BILIRUB UR QL STRIP: NEGATIVE
COLOR UR AUTO: YELLOW
GLUCOSE UR STRIP-MCNC: NEGATIVE MG/DL
HGB UR QL STRIP: NEGATIVE
KETONES UR STRIP-MCNC: NEGATIVE MG/DL
LEUKOCYTE ESTERASE UR QL STRIP: NEGATIVE
MUCOUS THREADS #/AREA URNS LPF: PRESENT /LPF
NITRATE UR QL: NEGATIVE
PH UR STRIP: 6 PH (ref 5–7)
RBC #/AREA URNS AUTO: <1 /HPF (ref 0–2)
SP GR UR STRIP: 1.02 (ref 1–1.03)
SQUAMOUS #/AREA URNS AUTO: 22 /HPF (ref 0–1)
TRANS CELLS #/AREA URNS HPF: <1 /HPF (ref 0–1)
URN SPEC COLLECT METH UR: ABNORMAL
UROBILINOGEN UR STRIP-MCNC: 2 MG/DL (ref 0–2)
WBC #/AREA URNS AUTO: 2 /HPF (ref 0–2)

## 2017-07-29 PROCEDURE — 99284 EMERGENCY DEPT VISIT MOD MDM: CPT | Mod: 25

## 2017-07-29 PROCEDURE — 87086 URINE CULTURE/COLONY COUNT: CPT | Performed by: EMERGENCY MEDICINE

## 2017-07-29 PROCEDURE — 51798 US URINE CAPACITY MEASURE: CPT

## 2017-07-29 PROCEDURE — 25000132 ZZH RX MED GY IP 250 OP 250 PS 637: Performed by: EMERGENCY MEDICINE

## 2017-07-29 PROCEDURE — 81001 URINALYSIS AUTO W/SCOPE: CPT | Performed by: EMERGENCY MEDICINE

## 2017-07-29 RX ORDER — OXYCODONE HYDROCHLORIDE 5 MG/1
5 TABLET ORAL ONCE
Status: COMPLETED | OUTPATIENT
Start: 2017-07-29 | End: 2017-07-29

## 2017-07-29 RX ADMIN — OXYCODONE HYDROCHLORIDE 5 MG: 5 TABLET ORAL at 10:28

## 2017-07-29 ASSESSMENT — ENCOUNTER SYMPTOMS
FREQUENCY: 1
FLANK PAIN: 1
FEVER: 0
VOMITING: 0
CHILLS: 1
DYSURIA: 1
DIFFICULTY URINATING: 1
NAUSEA: 0

## 2017-07-29 NOTE — ED AVS SNAPSHOT
Emergency Department    [address] [facility] [address]    Phone:  [phone]    Fax:  [phone]                                       Julia Smith   MRN: [id number]    Department:   Emergency Department   Date of Visit:  7/29/2017           After Visit Summary Signature Page     I have received my discharge instructions, and my questions have been answered. I have discussed any challenges I see with this plan with the nurse or doctor.    ..........................................................................................................................................  Patient/Patient Representative Signature      ..........................................................................................................................................  Patient Representative Print Name and Relationship to Patient    ..................................................               ................................................  Date                                            Time    ..........................................................................................................................................  Reviewed by Signature/Title    ...................................................              ..............................................  Date                                                            Time

## 2017-07-29 NOTE — ED AVS SNAPSHOT
"  Emergency Department    [address] [facility] [address]    Phone:  [phone]    Fax:  [phone]                                       Julia Smith   MRN: [id number]    Department:   Emergency Department   Date of Visit:  7/29/2017           Patient Information     Date Of Birth          1985        Your diagnoses for this visit were:     Urinary frequency        You were seen by Dharmesh Stokes DO.      Follow-up Information     Follow up with Justin Wells MD In 2 days.    Specialty:  OB/Gyn    Why:  as scheduled    Contact information:    [facility]  [address]  [facility] [address] [phone]          Follow up with  Emergency Department.    Specialty:  EMERGENCY MEDICINE    Why:  If symptoms worsen    Contact information:    [address] [facility] [address] [phone]        Discharge Instructions         Dysuria     Painful urination (dysuria) is often caused by a problem in the urinary tract.   Dysuria is pain felt during urination. It is often described as a burning. Learn more about this problem and how it can be treated.  What causes dysuria?  Possible causes include:    Infection with a bacteria or virus such as a urinary tract infection (UTI or a sexually transmitted infection (STI)    Sensitivity or allergy to chemicals such as those found in lotions and other products    Prostate or bladder problems    Radiation therapy to the pelvic area  How is dysuria diagnosed?  Your healthcare provider will examine you. He or she will ask about your symptoms and health. After talking with you and doing a physical exam, your healthcare provider may know what is causing your dysuria. He or she will usually request  a sample of your urine. Tests of your urine, or a \"urinalysis,\" are done. A urinalysis may include:    Looking at the urine sample (visual exam)    Checking for substances (chemical exam)    Looking at a " small amount under a microscope (microscopic exam)  Some parts of the urinalysis may be done in the provider's office and some in a lab. And, the urine sample may be checked for bacteria and yeast (urine culture). Your healthcare provider will tell you more about these tests if they are needed.  How is dysuria treated?  Treatment depends on the cause. If you have a bacterial infection, you may need antibiotics. You may be given medicines to make it easier for you to urinate and help relieve pain. Your healthcare provider can tell you more about your treatment options. Untreated, symptoms may get worse.  When to call your healthcare provider  Call the healthcare provider right away if you have any of the following:    Fever of 100.4 F (38 C) or higher     No improvement after three days of treatment    Trouble urinating because of pain    New or increased discharge from the vagina or penis    Rash or joint pain    Increased back or abdominal pain    Enlarged painful lymph nodes (lumps) in the groin   Date Last Reviewed: 1/1/2017 2000-2017 [employer]). [address] [place] [address]. All rights reserved. This information is not intended as a substitute for professional medical care. Always follow your healthcare professional's instructions.          24 Hour Appointment Hotline       To make an appointment at any [facility], call [phone] ([phone]). If you don't have a family doctor or clinic, we will help you find one. [facility] are conveniently located to serve the needs of you and your family.             Review of your medicines      Our records show that you are taking the medicines listed below. If these are incorrect, please call your family doctor or clinic.        Dose / Directions Last dose taken    acetaminophen 325 MG tablet   Commonly known as:  TYLENOL   Dose:  650 mg   Quantity:  100 tablet        Take 2 tablets (650 mg) by mouth every 4 hours as  needed for other (mild pain)   Refills:  0        ADDERALL PO   Dose:  5 mg        Take 5 mg by mouth daily   Refills:  0        butalbital-acetaminophen-caffeine -40 MG per tablet   Commonly known as:  FIORICET/ESGIC   Dose:  1 tablet        Take 1 tablet by mouth 3 times daily as needed for headaches   Refills:  0        CLONAZEPAM PO   Dose:  1 mg        Take 1 mg by mouth At Bedtime   Refills:  0        hydrOXYzine 25 MG tablet   Commonly known as:  ATARAX   Dose:  25 mg   Quantity:  30 tablet        Take 1 tablet (25 mg) by mouth every 6 hours as needed for itching (and nausea and pain)   Refills:  0        LAMICTAL PO   Dose:  100 mg        Take 100 mg by mouth 2 times daily   Refills:  0        METHOCARBAMOL PO   Dose:  750 mg        Take 750 mg by mouth daily as needed for muscle spasms   Refills:  0        naproxen 500 MG tablet   Commonly known as:  NAPROSYN   Dose:  500 mg   Quantity:  50 tablet        Take 1 tablet (500 mg) by mouth 2 times daily as needed for moderate pain   Refills:  1        norethindrone-ethinyl estradiol 1-35 MG-MCG per tablet   Commonly known as:  ORTHO-NOVUM 1-35 TAB,NORTREL 1-35 TAB   Dose:  1 tablet        Take 1 tablet by mouth daily   Refills:  0        oxyCODONE 5 MG IR tablet   Commonly known as:  ROXICODONE   Dose:  5-10 mg   Quantity:  75 tablet        Take 1-2 tablets (5-10 mg) by mouth every 3 hours as needed for pain or other (Moderate to Severe)   Refills:  0        promethazine 25 MG tablet   Commonly known as:  PHENERGAN   Dose:  25 mg        Take 25 mg by mouth daily as needed for nausea   Refills:  0        SERTRALINE HCL PO   Dose:  100 mg        Take 100 mg by mouth At Bedtime   Refills:  0        sulfamethoxazole-trimethoprim 800-160 MG per tablet   Commonly known as:  BACTRIM DS/SEPTRA DS   Dose:  1 tablet   Quantity:  14 tablet        Take 1 tablet by mouth 2 times daily   Refills:  0                Procedures and tests performed during your visit     UA  with Microscopic reflex to Culture      Orders Needing Specimen Collection     None      Pending Results     No orders found from 7/27/2017 to 7/30/2017.            Pending Culture Results     No orders found from 7/27/2017 to 7/30/2017.            Pending Results Instructions     If you had any lab results that were not finalized at the time of your Discharge, you can call the ED Lab Result RN at [phone]. You will be contacted by this team for any positive Lab results or changes in treatment. The nurses are available 7 days a week from 10A to 6:30P.  You can leave a message 24 hours per day and they will return your call.        Test Results From Your Hospital Stay        7/29/2017 10:07 AM      Component Results     Component Value Ref Range & Units Status    Color Urine Yellow  Final    Appearance Urine Slightly Cloudy  Final    Glucose Urine Negative NEG mg/dL Final    Bilirubin Urine Negative NEG Final    Ketones Urine Negative NEG mg/dL Final    Specific Gravity Urine 1.022 1.003 - 1.035 Final    Blood Urine Negative NEG Final    pH Urine 6.0 5.0 - 7.0 pH Final    Protein Albumin Urine 10 (A) NEG mg/dL Final    Urobilinogen mg/dL 2.0 0.0 - 2.0 mg/dL Final    Nitrite Urine Negative NEG Final    Leukocyte Esterase Urine Negative NEG Final    Source Midstream Urine  Final    WBC Urine 2 0 - 2 /HPF Final    RBC Urine <1 0 - 2 /HPF Final    Bacteria Urine Few (A) NEG /HPF Final    Squamous Epithelial /HPF Urine 22 (H) 0 - 1 /HPF Final    Transitional Epi <1 0 - 1 /HPF Final    Mucous Urine Present (A) NEG /LPF Final                Clinical Quality Measure: Blood Pressure Screening     Your blood pressure was checked while you were in the emergency department today. The last reading we obtained was  BP: 110/83 . Please read the guidelines below about what these numbers mean and what you should do about them.  If your systolic blood pressure (the top number) is less than 120 and your diastolic blood pressure  (the bottom number) is less than 80, then your blood pressure is normal. There is nothing more that you need to do about it.  If your systolic blood pressure (the top number) is 120-139 or your diastolic blood pressure (the bottom number) is 80-89, your blood pressure may be higher than it should be. You should have your blood pressure rechecked within a year by a primary care provider.  If your systolic blood pressure (the top number) is 140 or greater or your diastolic blood pressure (the bottom number) is 90 or greater, you may have high blood pressure. High blood pressure is treatable, but if left untreated over time it can put you at risk for heart attack, stroke, or kidney failure. You should have your blood pressure rechecked by a primary care provider within the next 4 weeks.  If your provider in the emergency department today gave you specific instructions to follow-up with your doctor or provider even sooner than that, you should follow that instruction and not wait for up to 4 weeks for your follow-up visit.        Thank you for choosing [facility]       Thank you for choosing [facility] for your care. Our goal is always to provide you with excellent care. Hearing back from our patients is one way we can continue to improve our services. Please take a few minutes to complete the written survey that you may receive in the mail after you visit with us. Thank you!        [employer] Information     [employer] gives you secure access to your electronic health record. If you see a primary care provider, you can also send messages to your care team and make appointments. If you have questions, please call your primary care clinic.  If you do not have a primary care provider, please call [phone] and they will assist you.        Care EveryWhere ID     This is your Care EveryWhere ID. This could be used by other organizations to access your [facility] medical records  [id number]        Equal Access to Services     CHICHI  ELLEN : Marvinii ty Perrin, waadrianneda luqadaha, qaybta kaalvitaliy lynch, shimon thomas. So [facility] [phone].    ATENCIÓN: Si habla español, tiene a smith disposición servicios gratuitos de asistencia lingüística. Llame al [phone].    We comply with applicable federal civil rights laws and [place] laws. We do not discriminate on the basis of race, color, national origin, age, disability sex, sexual orientation or gender identity.            After Visit Summary       This is your record. Keep this with you and show to your community pharmacist(s) and doctor(s) at your next visit.

## 2017-07-29 NOTE — ED PROVIDER NOTES
Emergency Department Attending Supervision Note  7/29/2017  10:32 AM    I evaluated this patient in conjunction with KAIT Robertson.    Briefly, the patient presented with dysuria and urinary frequency. The patient had a oophorectomy 10 days ago. The patient was concerned about urinary tract infection. The patient denies any vaginal bleeding or discharge. The patient has chronic pelvic pain. Many years ago she had a hysterectomy and oophorectomy. The patient has not had any significant nausea, vomiting or diarrhea. The patient takes 10 mg of oxycodone every 4 hours for chronic pelvic pain. The patient has followed at pain clinic in the past. The patient has a follow-up with her gynecologist on Monday, Dr. Pratt. The patient took in Uber here and plans to get a ride home with a friend. She did not drive. She last took oxycodone at 6:30 this morning.    On my exam:  Patient Vitals for the past 24 hrs:   BP Temp Temp src Pulse Resp SpO2   07/29/17 1029 - - - - 16 -   07/29/17 0949 110/83 98.2  F (36.8  C) Oral 88 16 98 %     Physical Exam   General:  Sitting on bed, comfortable appearing.   HENT:  No obvious trauma to head  Right Ear:  External ear normal.   Left Ear:  External ear normal.   Nose:  Nose normal.   Eyes:  Conjunctivae and EOM are normal.  Neck: Normal range of motion. Neck supple. No tracheal deviation present.   Pulm/Chest: No respiratory distress  M/S: Normal range of motion.   ABD: No abdominal tenderness guarding or rigidity. Very slight suprapubic tenderness.  Neuro: Alert. GCS 15.  Skin: Skin is warm and dry. No rash noted. Not diaphoretic.   Psych: Normal mood and affect. Behavior is normal.     Brief MDM:  Julia Smith is a very pleasant 32 year old year old female who presents to the emergency department with concern of possible urinary tract infection. A urinalysis shows no evidence of infection. A bladder scan postvoid showed no urine in the bladder. I doubt urinary retention. A  urine culture is ordered and if this grows bacteria antibiotics will need to be called in for her. In further discussion with the patient she admits to a history of chronic pelvic pain from endometriosis. She has been on a TCA and Gabapentin in the past. She has followed a pain clinic in the past. The patient has a follow-up on Monday with Dr. Pratt. The patient is hemodynamically stable and has a nonsurgical abdomen. Being 10 days from surgery I doubt surgical complication such as postop seroma, hematoma, infection etc. The patient has not had a fever. I do not believe a CT scan is necessary given the ionizing radiation exposure and history of chronic pelvic pain. The patient is provided one oxycodone here and has a ride home. The patient denies any new sexual partners and has no concerns for STD such as herpes causing dysuria.    The treatment plan was discussed with the patient and they expressed understanding of this plan and consented to the plan.  In addition, the patient will return to the emergency department if their symptoms persist, worsen, if new symptoms arise or if there is any concern as other pathology may be present that is not evident at this time. They also understand the importance of close follow up in the clinic and if unable to do so will return to the emergency department for a reevaluation. All questions were answered.    My Impression and diagnosis:    ICD-10-CM    1. Urinary frequency R35.0      DO Kike Crawford Robert James, DO  07/29/17 1039

## 2017-07-29 NOTE — DISCHARGE INSTRUCTIONS

## 2017-07-29 NOTE — ED PROVIDER NOTES
History     Chief Complaint:  Urinary frequency    HPI   Julia Smith is a 32 year old female with a history of PCOS, endometriosis, and migraines  who presents to the emergency department for evaluation of urinary frequency and pain. Of note, the patient reports a hysterectomy August 2016 with residual pain in her remaining right ovary, which prompted her to receive DaVinci salpingo-oophorectomy on 7/19/2017 by Dr. Wells, OB/GYN for endometriosis and chronic pelvic pain. Since surgery, the patient reports pain, burning with urination and inability to void that has since worsened and become constant. The patient's doctor advised her to seek evaluation in the emergency department if her post-surgery pain increased secondary to him being out of town.  She reports taking 10 mg oxycodone, her post-surgery pain medication, without relief. She does follow with a pain clinic. She also denies improvement with tylenol and muscle relaxers. She reports decreased energy and slight flank pain as well as hot flashes and chills, but denies actually measuring her temperature. She does have suprapubic pain. The patient denies nausea, vomiting.  BMs have been regular and normal for her.     Allergies:  Decadron  Depakote  ibuprofen  Tramadol  Valproic acid     Medications:   naproxen   sulfamethoxazole-trimethoprim   CLONAZEPAM   METHOCARBAMOL  promethazine   SERTRALINE HCL   norethindrone-ethinyl estradiol   LamoTRIgine   oxycodone   hydrOXYzine   butalbital-acetaminophen-caffeine   Amphetamine-Dextroamphetamine    Past Medical History:    Syncope  Agoraphobia   Anxiety  Bipolar 2 disorder   Endometriosis   Fibromyalgia   Gastro-esophageal reflux disease   Migraine   OCD     Panic attacks   PCOS   PTSD   TMJ    Past Surgical History:    Colonoscopy  Cystoscopy  DaVinci assisted ablation/excision of endometriosis  DaVinci hysterectomy total  DaVinci salpingo-oophorectomy  Appendectomy    Family History:    The patient is  adopted  Depression    Social History:  Current daily smoker: 0.5 packs/day  Negative for alcohol use.  Marital Status:   [2]    Review of Systems   Constitutional: Positive for chills. Negative for fever.   Gastrointestinal: Negative for nausea and vomiting.   Genitourinary: Positive for decreased urine volume, difficulty urinating, dysuria, flank pain, frequency and urgency.   All other systems reviewed and are negative.      Physical Exam   First Vitals:    /83  Pulse 88  Temp 98.2  F (36.8  C) (Oral)  Resp 16  LMP 01/03/2014  SpO2 98%     Physical Exam  General: Resting comfortably.  Alert and oriented. Appears uncomfortable.  Head:  The scalp, face, and head appear normal   Eyes:  The pupils are equal, round, and reactive to light     Extraocular muscles are intact    Conjunctivae and sclerae are normal    CV:  Regular rate and rhythm     Normal S1/S2    No pathological murmur detected   Resp:  Lungs are clear to auscultation    Non-labored    No rales or wheezing   GI:  Abdomen is soft, non-distended    No rebound tenderness     Normal bowel sounds     Mild tenderness to palpation of suprapubic area. No distention.   MS:  Normal muscular tone   Skin:  Well healing scars from recent laparoscopy. No signs of infection.  Neuro: Speech is normal and fluent.     Emergency Department Course     Imaging:  Radiographic findings were communicated with the patient who voiced understanding of the findings.    Bedside bladder scan score: 0   Laboratory:  UA with micro: protein albumin 10, Bacteria few, Squamous epithelial few, mucous present, o/w negative  Urine culture: In process    Emergency Department Course:  Nursing notes and vitals reviewed. I performed an exam of the patient as documented above.     IV inserted. Medicine administered as documented above. Blood drawn. This was sent to the lab for further testing, results above.    The patient provided a urine sample here in the emergency  department. This was sent for laboratory testing, findings above.     The patient received a bladder scan at the bedside and was 0.     1016 I rechecked the patient and discussed the results of their workup thus far.     Findings and plan explained to the Patient. Patient discharged home with instructions regarding supportive care, medications, and reasons to return. The importance of close follow-up was reviewed.     I personally reviewed the laboratory results with the Patient and answered all related questions prior to discharge.     Impression & Plan    Medical Decision Making:  Julia Smith is a 32 year old female with a history of PCOS, endometriosis, and migraines  who presents to the emergency department for evaluation of urinary frequency and pain. Bladder scan was negative for retained urine. UA did not demonstrate any signs of infection. I think her symptoms have a chronic pain component to them. Her abdomen is benign and is non surgical. I do not believe she needs a CT scan at this time. She already has a scheduled follow up appointment on Monday with Dr. Wells. I think this is appropriate follow up. The patient was given an Oxycodone here in the ED.  The patient will be discharged home with instructions for close follow up with her OBGYN. All questions were answered prior to discharge. The patient understands and agrees to this plan.     Diagnosis:    ICD-10-CM    1. Urinary frequency R35.0        Disposition:  discharged to home    I, Jacquie Moeller, am serving as a scribe on 7/29/2017 at 10:16 AM to personally document services performed by Dharmesh Stokes MD based on my observations and the provider's statements to me.     Jacquie Moeller  7/29/2017    EMERGENCY DEPARTMENT       Hillary Kirk PA-C  07/29/17 1056

## 2017-07-30 LAB
BACTERIA SPEC CULT: NO GROWTH
Lab: NORMAL
MICRO REPORT STATUS: NORMAL
SPECIMEN SOURCE: NORMAL

## 2017-08-05 ENCOUNTER — HOSPITAL ENCOUNTER (EMERGENCY)
Facility: CLINIC | Age: 32
Discharge: HOME | End: 2017-08-05
Attending: EMERGENCY MEDICINE | Admitting: EMERGENCY MEDICINE
Payer: COMMERCIAL

## 2017-08-05 ENCOUNTER — APPOINTMENT (OUTPATIENT)
Dept: CT IMAGING | Facility: CLINIC | Age: 32
End: 2017-08-05
Attending: EMERGENCY MEDICINE
Payer: COMMERCIAL

## 2017-08-05 VITALS
HEIGHT: 64 IN | RESPIRATION RATE: 14 BRPM | SYSTOLIC BLOOD PRESSURE: 103 MMHG | DIASTOLIC BLOOD PRESSURE: 61 MMHG | OXYGEN SATURATION: 97 % | HEART RATE: 84 BPM | TEMPERATURE: 98.3 F

## 2017-08-05 DIAGNOSIS — G89.18 ACUTE POST-OPERATIVE PAIN: ICD-10-CM

## 2017-08-05 DIAGNOSIS — N99.842 POSTOPERATIVE SEROMA INVOLVING GENITOURINARY SYSTEM AFTER GENITOURINARY PROCEDURE: ICD-10-CM

## 2017-08-05 LAB
ALBUMIN SERPL-MCNC: 3.3 G/DL (ref 3.4–5)
ALBUMIN UR-MCNC: NEGATIVE MG/DL
ALP SERPL-CCNC: 71 U/L (ref 40–150)
ALT SERPL W P-5'-P-CCNC: 13 U/L (ref 0–50)
ANION GAP SERPL CALCULATED.3IONS-SCNC: 7 MMOL/L (ref 3–14)
APPEARANCE UR: CLEAR
APTT PPP: 29 SEC (ref 22–37)
AST SERPL W P-5'-P-CCNC: 11 U/L (ref 0–45)
BASOPHILS # BLD AUTO: 0 10E9/L (ref 0–0.2)
BASOPHILS NFR BLD AUTO: 0.4 %
BILIRUB SERPL-MCNC: 0.3 MG/DL (ref 0.2–1.3)
BILIRUB UR QL STRIP: NEGATIVE
BUN SERPL-MCNC: 10 MG/DL (ref 7–30)
CALCIUM SERPL-MCNC: 8.8 MG/DL (ref 8.5–10.1)
CHLORIDE SERPL-SCNC: 106 MMOL/L (ref 94–109)
CO2 SERPL-SCNC: 24 MMOL/L (ref 20–32)
COLOR UR AUTO: YELLOW
CREAT SERPL-MCNC: 0.73 MG/DL (ref 0.52–1.04)
DIFFERENTIAL METHOD BLD: NORMAL
EOSINOPHIL # BLD AUTO: 0.2 10E9/L (ref 0–0.7)
EOSINOPHIL NFR BLD AUTO: 2 %
ERYTHROCYTE [DISTWIDTH] IN BLOOD BY AUTOMATED COUNT: 13.6 % (ref 10–15)
GFR SERPL CREATININE-BSD FRML MDRD: ABNORMAL ML/MIN/1.7M2
GLUCOSE SERPL-MCNC: 84 MG/DL (ref 70–99)
GLUCOSE UR STRIP-MCNC: NEGATIVE MG/DL
HCT VFR BLD AUTO: 36.5 % (ref 35–47)
HGB BLD-MCNC: 12.6 G/DL (ref 11.7–15.7)
HGB UR QL STRIP: NEGATIVE
IMM GRANULOCYTES # BLD: 0 10E9/L (ref 0–0.4)
IMM GRANULOCYTES NFR BLD: 0.3 %
INR PPP: 1.02 (ref 0.86–1.14)
KETONES UR STRIP-MCNC: NEGATIVE MG/DL
LEUKOCYTE ESTERASE UR QL STRIP: NEGATIVE
LIPASE SERPL-CCNC: 1193 U/L (ref 73–393)
LYMPHOCYTES # BLD AUTO: 3.7 10E9/L (ref 0.8–5.3)
LYMPHOCYTES NFR BLD AUTO: 37.1 %
MCH RBC QN AUTO: 30.5 PG (ref 26.5–33)
MCHC RBC AUTO-ENTMCNC: 34.5 G/DL (ref 31.5–36.5)
MCV RBC AUTO: 88 FL (ref 78–100)
MONOCYTES # BLD AUTO: 0.3 10E9/L (ref 0–1.3)
MONOCYTES NFR BLD AUTO: 3.5 %
NEUTROPHILS # BLD AUTO: 5.6 10E9/L (ref 1.6–8.3)
NEUTROPHILS NFR BLD AUTO: 56.7 %
NITRATE UR QL: NEGATIVE
NRBC # BLD AUTO: 0 10*3/UL
NRBC BLD AUTO-RTO: 0 /100
PH UR STRIP: 7 PH (ref 5–7)
PLATELET # BLD AUTO: 381 10E9/L (ref 150–450)
POTASSIUM SERPL-SCNC: 4.2 MMOL/L (ref 3.4–5.3)
PROT SERPL-MCNC: 6.9 G/DL (ref 6.8–8.8)
RBC # BLD AUTO: 4.13 10E12/L (ref 3.8–5.2)
SODIUM SERPL-SCNC: 137 MMOL/L (ref 133–144)
SP GR UR STRIP: 1.01 (ref 1–1.03)
URN SPEC COLLECT METH UR: NORMAL
UROBILINOGEN UR STRIP-MCNC: NORMAL MG/DL (ref 0–2)
WBC # BLD AUTO: 9.8 10E9/L (ref 4–11)

## 2017-08-05 PROCEDURE — 85730 THROMBOPLASTIN TIME PARTIAL: CPT | Performed by: EMERGENCY MEDICINE

## 2017-08-05 PROCEDURE — 96375 TX/PRO/DX INJ NEW DRUG ADDON: CPT

## 2017-08-05 PROCEDURE — 25000132 ZZH RX MED GY IP 250 OP 250 PS 637: Performed by: EMERGENCY MEDICINE

## 2017-08-05 PROCEDURE — 99285 EMERGENCY DEPT VISIT HI MDM: CPT | Mod: 25

## 2017-08-05 PROCEDURE — 85610 PROTHROMBIN TIME: CPT | Performed by: EMERGENCY MEDICINE

## 2017-08-05 PROCEDURE — 25000125 ZZHC RX 250: Performed by: EMERGENCY MEDICINE

## 2017-08-05 PROCEDURE — 85025 COMPLETE CBC W/AUTO DIFF WBC: CPT | Performed by: EMERGENCY MEDICINE

## 2017-08-05 PROCEDURE — 74177 CT ABD & PELVIS W/CONTRAST: CPT

## 2017-08-05 PROCEDURE — 96376 TX/PRO/DX INJ SAME DRUG ADON: CPT

## 2017-08-05 PROCEDURE — 83690 ASSAY OF LIPASE: CPT | Performed by: EMERGENCY MEDICINE

## 2017-08-05 PROCEDURE — 96361 HYDRATE IV INFUSION ADD-ON: CPT

## 2017-08-05 PROCEDURE — 81003 URINALYSIS AUTO W/O SCOPE: CPT | Performed by: EMERGENCY MEDICINE

## 2017-08-05 PROCEDURE — 25000128 H RX IP 250 OP 636: Performed by: EMERGENCY MEDICINE

## 2017-08-05 PROCEDURE — 96374 THER/PROPH/DIAG INJ IV PUSH: CPT | Mod: 59

## 2017-08-05 PROCEDURE — 80053 COMPREHEN METABOLIC PANEL: CPT | Performed by: EMERGENCY MEDICINE

## 2017-08-05 RX ORDER — IOPAMIDOL 755 MG/ML
75 INJECTION, SOLUTION INTRAVASCULAR ONCE
Status: COMPLETED | OUTPATIENT
Start: 2017-08-05 | End: 2017-08-05

## 2017-08-05 RX ORDER — KETOROLAC TROMETHAMINE 30 MG/ML
30 INJECTION, SOLUTION INTRAMUSCULAR; INTRAVENOUS ONCE
Status: COMPLETED | OUTPATIENT
Start: 2017-08-05 | End: 2017-08-05

## 2017-08-05 RX ORDER — OXYCODONE HYDROCHLORIDE 5 MG/1
5 TABLET ORAL ONCE
Status: COMPLETED | OUTPATIENT
Start: 2017-08-05 | End: 2017-08-05

## 2017-08-05 RX ORDER — OXYCODONE HYDROCHLORIDE 5 MG/1
5-10 TABLET ORAL
Qty: 15 TABLET | Refills: 0 | Status: SHIPPED | OUTPATIENT
Start: 2017-08-05 | End: 2017-09-20

## 2017-08-05 RX ORDER — MORPHINE SULFATE 10 MG/ML
6 INJECTION, SOLUTION INTRAMUSCULAR; INTRAVENOUS
Status: DISCONTINUED | OUTPATIENT
Start: 2017-08-05 | End: 2017-08-05 | Stop reason: HOSPADM

## 2017-08-05 RX ORDER — ONDANSETRON 2 MG/ML
4 INJECTION INTRAMUSCULAR; INTRAVENOUS EVERY 30 MIN PRN
Status: DISCONTINUED | OUTPATIENT
Start: 2017-08-05 | End: 2017-08-05 | Stop reason: HOSPADM

## 2017-08-05 RX ORDER — ACETAMINOPHEN 500 MG
1000 TABLET ORAL EVERY 8 HOURS PRN
Qty: 30 TABLET | Refills: 0 | Status: SHIPPED | OUTPATIENT
Start: 2017-08-05 | End: 2018-05-08

## 2017-08-05 RX ADMIN — MORPHINE SULFATE 6 MG: 10 INJECTION, SOLUTION INTRAMUSCULAR; INTRAVENOUS at 18:04

## 2017-08-05 RX ADMIN — ONDANSETRON 4 MG: 2 SOLUTION INTRAMUSCULAR; INTRAVENOUS at 15:31

## 2017-08-05 RX ADMIN — KETOROLAC TROMETHAMINE 30 MG: 30 INJECTION, SOLUTION INTRAMUSCULAR; INTRAVENOUS at 15:31

## 2017-08-05 RX ADMIN — IOPAMIDOL 75 ML: 755 INJECTION, SOLUTION INTRAVENOUS at 15:47

## 2017-08-05 RX ADMIN — MORPHINE SULFATE 6 MG: 10 INJECTION, SOLUTION INTRAMUSCULAR; INTRAVENOUS at 16:47

## 2017-08-05 RX ADMIN — OXYCODONE HYDROCHLORIDE 5 MG: 5 TABLET ORAL at 20:29

## 2017-08-05 RX ADMIN — SODIUM CHLORIDE 63 ML: 9 INJECTION, SOLUTION INTRAVENOUS at 15:48

## 2017-08-05 RX ADMIN — SODIUM CHLORIDE 1000 ML: 9 INJECTION, SOLUTION INTRAVENOUS at 17:17

## 2017-08-05 ASSESSMENT — ENCOUNTER SYMPTOMS
NAUSEA: 1
VOMITING: 0
SHORTNESS OF BREATH: 0
DYSURIA: 0
BACK PAIN: 0
DIARRHEA: 0
ABDOMINAL PAIN: 1
FEVER: 0
HEMATURIA: 0

## 2017-08-05 NOTE — ED AVS SNAPSHOT
Emergency Department    [address] [facility] [address]    Phone:  [phone]    Fax:  [phone]                                       Julia Smith   MRN: [id number]    Department:   Emergency Department   Date of Visit:  8/5/2017           Patient Information     Date Of Birth          1985        Your diagnoses for this visit were:     Postoperative seroma involving genitourinary system after genitourinary procedure     Acute post-operative pain        You were seen by Eliseo Gomez MD.      Follow-up Information     Follow up with Justin Wells MD In 2 days.    Specialty:  OB/Gyn    Why:  For follow up.    Contact information:    [address] [facility] [address] [phone]          Follow up with No Ref-Primary, Physician In 1 week.    Why:  For follow up        Discharge Instructions       Follow up with your surgeon on Monday. Call to schedule an appointment. Your surgeon must prescribe future pain medications.      Postsurgical Seroma    A seroma is a sterile collection of fluid under the skin, usually at the site of a surgical incision. Fluid builds up under the skin where tissue was removed. It may form soon after your surgery. Or it may form up to about 1 to 2 weeks after surgery. It may look like a swollen lump and feel tender or sore.  A small seroma is not dangerous. Depending on its size and symptoms, it may not need to be treated. The seroma may go away on its own within a few weeks or months. Your body slowly absorbs the fluid. No medicine will make it go away faster. But if you have a large seroma or if it is causing pain, your healthcare provider may drain it. This is done with a syringe and needle. Or the provider may put in a drain. Seromas can return and may need to be drained multiple times. Rarely, you may need a minor procedure to remove the seroma. Long-term problems from a seroma are rare.  Home care  You may be given  medicines to relieve pain. These may include acetaminophen and ibuprofen. Take these as directed. Check the seroma daily for the signs of infection listed below.  Follow-up care  Follow up with your healthcare provider, or as advised.  When to seek medical advice  Call your healthcare provider right away if you have signs of infection:    Fever of 100.4 F (38 C) or higher, or as directed by your healthcare provider    Seroma or skin around it feels warm    Pain in the seroma that gets worse    Redness or swelling that gets worse  Also call your provider right away if any of these occur:    Drainage from the seroma that is white or colored or very bloody. Clear or slightly bloody drainage is normal.    Wound opens up    Rapid heart rate    Shortness of breath  Date Last Reviewed: 1/1/2017 2000-2017 [employer]. [address] [place] [address]. All rights reserved. This information is not intended as a substitute for professional medical care. Always follow your healthcare professional's instructions.          24 Hour Appointment Hotline       To make an appointment at any [facility], call [phone] ([phone]). If you don't have a family doctor or clinic, we will help you find one. [facility] clinics are conveniently located to serve the needs of you and your family.             Review of your medicines      CONTINUE these medicines which may have CHANGED, or have new prescriptions. If we are uncertain of the size of tablets/capsules you have at home, strength may be listed as something that might have changed.        Dose / Directions Last dose taken    acetaminophen 500 MG tablet   Commonly known as:  TYLENOL   Dose:  1000 mg   What changed:    - medication strength  - how much to take  - when to take this  - reasons to take this   Quantity:  30 tablet        Take 2 tablets (1,000 mg) by mouth every 8 hours as needed for pain   Refills:  0        oxyCODONE 5 MG IR tablet    Commonly known as:  ROXICODONE   Dose:  5-10 mg   What changed:  reasons to take this   Quantity:  15 tablet        Take 1-2 tablets (5-10 mg) by mouth every 3 hours as needed for moderate to severe pain   Refills:  0          Our records show that you are taking the medicines listed below. If these are incorrect, please call your family doctor or clinic.        Dose / Directions Last dose taken    ADDERALL PO   Dose:  5 mg        Take 5 mg by mouth daily   Refills:  0        butalbital-acetaminophen-caffeine -40 MG per tablet   Commonly known as:  FIORICET/ESGIC   Dose:  1 tablet        Take 1 tablet by mouth 3 times daily as needed for headaches   Refills:  0        CLONAZEPAM PO   Dose:  1 mg        Take 1 mg by mouth At Bedtime   Refills:  0        hydrOXYzine 25 MG tablet   Commonly known as:  ATARAX   Dose:  25 mg   Quantity:  30 tablet        Take 1 tablet (25 mg) by mouth every 6 hours as needed for itching (and nausea and pain)   Refills:  0        LAMICTAL PO   Dose:  100 mg        Take 100 mg by mouth 2 times daily   Refills:  0        METHOCARBAMOL PO   Dose:  750 mg        Take 750 mg by mouth daily as needed for muscle spasms   Refills:  0        naproxen 500 MG tablet   Commonly known as:  NAPROSYN   Dose:  500 mg   Quantity:  50 tablet        Take 1 tablet (500 mg) by mouth 2 times daily as needed for moderate pain   Refills:  1        norethindrone-ethinyl estradiol 1-35 MG-MCG per tablet   Commonly known as:  ORTHO-NOVUM 1-35 TAB,NORTREL 1-35 TAB   Dose:  1 tablet        Take 1 tablet by mouth daily   Refills:  0        promethazine 25 MG tablet   Commonly known as:  PHENERGAN   Dose:  25 mg        Take 25 mg by mouth daily as needed for nausea   Refills:  0        SERTRALINE HCL PO   Dose:  100 mg        Take 100 mg by mouth At Bedtime   Refills:  0                Prescriptions were sent or printed at these locations (2 Prescriptions)                   Other Prescriptions                 Printed at Department/Unit printer (2 of 2)         oxyCODONE (ROXICODONE) 5 MG IR tablet               acetaminophen (TYLENOL) 500 MG tablet                Procedures and tests performed during your visit     CBC with platelets differential    CT Abdomen Pelvis w Contrast    Comprehensive metabolic panel    INR    Lipase    Partial thromboplastin time    UA reflex to Microscopic and Culture      Orders Needing Specimen Collection     None      Pending Results     No orders found from 8/3/2017 to 8/6/2017.            Pending Culture Results     No orders found from 8/3/2017 to 8/6/2017.            Pending Results Instructions     If you had any lab results that were not finalized at the time of your Discharge, you can call the ED Lab Result RN at [phone]. You will be contacted by this team for any positive Lab results or changes in treatment. The nurses are available 7 days a week from 10A to 6:30P.  You can leave a message 24 hours per day and they will return your call.        Test Results From Your Hospital Stay        8/5/2017  2:51 PM      Component Results     Component Value Ref Range & Units Status    WBC 9.8 4.0 - 11.0 10e9/L Final    RBC Count 4.13 3.8 - 5.2 10e12/L Final    Hemoglobin 12.6 11.7 - 15.7 g/dL Final    Hematocrit 36.5 35.0 - 47.0 % Final    MCV 88 78 - 100 fl Final    MCH 30.5 26.5 - 33.0 pg Final    MCHC 34.5 31.5 - 36.5 g/dL Final    RDW 13.6 10.0 - 15.0 % Final    Platelet Count 381 150 - 450 10e9/L Final    Diff Method Automated Method  Final    % Neutrophils 56.7 % Final    % Lymphocytes 37.1 % Final    % Monocytes 3.5 % Final    % Eosinophils 2.0 % Final    % Basophils 0.4 % Final    % Immature Granulocytes 0.3 % Final    Nucleated RBCs 0 0 /100 Final    Absolute Neutrophil 5.6 1.6 - 8.3 10e9/L Final    Absolute Lymphocytes 3.7 0.8 - 5.3 10e9/L Final    Absolute Monocytes 0.3 0.0 - 1.3 10e9/L Final    Absolute Eosinophils 0.2 0.0 - 0.7 10e9/L Final    Absolute Basophils 0.0 0.0 -  0.2 10e9/L Final    Abs Immature Granulocytes 0.0 0 - 0.4 10e9/L Final    Absolute Nucleated RBC 0.0  Final         8/5/2017  3:06 PM      Component Results     Component Value Ref Range & Units Status    INR 1.02 0.86 - 1.14 Final         8/5/2017  3:06 PM      Component Results     Component Value Ref Range & Units Status    PTT 29 22 - 37 sec Final         8/5/2017  3:11 PM      Component Results     Component Value Ref Range & Units Status    Sodium 137 133 - 144 mmol/L Final    Potassium 4.2 3.4 - 5.3 mmol/L Final    Chloride 106 94 - 109 mmol/L Final    Carbon Dioxide 24 20 - 32 mmol/L Final    Anion Gap 7 3 - 14 mmol/L Final    Glucose 84 70 - 99 mg/dL Final    Urea Nitrogen 10 7 - 30 mg/dL Final    Creatinine 0.73 0.52 - 1.04 mg/dL Final    GFR Estimate >90  Non  GFR Calc   >60 mL/min/1.7m2 Final    GFR Estimate If Black >90   GFR Calc   >60 mL/min/1.7m2 Final    Calcium 8.8 8.5 - 10.1 mg/dL Final    Bilirubin Total 0.3 0.2 - 1.3 mg/dL Final    Albumin 3.3 (L) 3.4 - 5.0 g/dL Final    Protein Total 6.9 6.8 - 8.8 g/dL Final    Alkaline Phosphatase 71 40 - 150 U/L Final    ALT 13 0 - 50 U/L Final    AST 11 0 - 45 U/L Final         8/5/2017  3:09 PM      Component Results     Component Value Ref Range & Units Status    Lipase 1193 (H) 73 - 393 U/L Final         8/5/2017  4:49 PM      Component Results     Component Value Ref Range & Units Status    Color Urine Yellow  Final    Appearance Urine Clear  Final    Glucose Urine Negative NEG mg/dL Final    Bilirubin Urine Negative NEG Final    Ketones Urine Negative NEG mg/dL Final    Specific Gravity Urine 1.010 1.003 - 1.035 Final    Blood Urine Negative NEG Final    pH Urine 7.0 5.0 - 7.0 pH Final    Protein Albumin Urine Negative NEG mg/dL Final    Urobilinogen mg/dL Normal 0.0 - 2.0 mg/dL Final    Nitrite Urine Negative NEG Final    Leukocyte Esterase Urine Negative NEG Final    Source Midstream Urine  Final         8/5/2017  4:02  PM      Narrative     CT ABDOMEN PELVIS WITH CONTRAST   8/5/2017 3:51 PM     HISTORY: Suprapubic and RLQ pain two weeks status post right  oophorectomy and lysis of adhesions. Prior appendectomy.    TECHNIQUE:  CT abdomen and pelvis with 75 mL Isovue-370 IV. Radiation  dose for this scan was reduced using automated exposure control,  adjustment of the mA and/or kV according to patient size, or iterative  reconstruction technique.    COMPARISON: CT abdomen and pelvis 5/3/2017.    FINDINGS: There is moderate volume but slightly loculated fluid within  the pelvis. This is approximately 8.5 x 5 cm at the central pelvis  extending posteriorly towards the right series 2 image 76. This fluid  appears new since the prior exam. It has very low internal density  suggesting simple fluid. The patient is status post appendectomy. No  bowel obstruction or inflammation. Small fluid distends a few distal  small bowel loops. The liver, gallbladder, adrenals, spleen, pancreas,  and kidneys show no acute abnormalities. No acute aortic abnormality.        Impression     IMPRESSION: Moderate volume loculated fluid within the central pelvis  is new. This could be a seroma. It is difficult to completely exclude  infected fluid, though it has low density suggesting simple fluid. No  other acute abnormality.     RED FLOWER MD                Clinical Quality Measure: Blood Pressure Screening     Your blood pressure was checked while you were in the emergency department today. The last reading we obtained was  BP: 99/63 . Please read the guidelines below about what these numbers mean and what you should do about them.  If your systolic blood pressure (the top number) is less than 120 and your diastolic blood pressure (the bottom number) is less than 80, then your blood pressure is normal. There is nothing more that you need to do about it.  If your systolic blood pressure (the top number) is 120-139 or your diastolic blood pressure (the  bottom number) is 80-89, your blood pressure may be higher than it should be. You should have your blood pressure rechecked within a year by a primary care provider.  If your systolic blood pressure (the top number) is 140 or greater or your diastolic blood pressure (the bottom number) is 90 or greater, you may have high blood pressure. High blood pressure is treatable, but if left untreated over time it can put you at risk for heart attack, stroke, or kidney failure. You should have your blood pressure rechecked by a primary care provider within the next 4 weeks.  If your provider in the emergency department today gave you specific instructions to follow-up with your doctor or provider even sooner than that, you should follow that instruction and not wait for up to 4 weeks for your follow-up visit.        Thank you for choosing [facility]       Thank you for choosing [facility] for your care. Our goal is always to provide you with excellent care. Hearing back from our patients is one way we can continue to improve our services. Please take a few minutes to complete the written survey that you may receive in the mail after you visit with us. Thank you!        [employer] Information     [employer] gives you secure access to your electronic health record. If you see a primary care provider, you can also send messages to your care team and make appointments. If you have questions, please call your primary care clinic.  If you do not have a primary care provider, please call [phone] and they will assist you.        Care EveryWhere ID     This is your Care EveryWhere ID. This could be used by other organizations to access your [facility] medical records  [id number]        Equal Access to Services     CHICHI HUGHES : Hadii ty Perrin, waandrei sanders, qaybta shimon beck. So [facility] [phone].    ATENCIÓN: Si habla español, tiene a smith disposición servicios gratuitos  de asistencia lingüística. Ena abebe [phone].    We comply with applicable federal civil rights laws and [place] laws. We do not discriminate on the basis of race, color, national origin, age, disability sex, sexual orientation or gender identity.            After Visit Summary       This is your record. Keep this with you and show to your community pharmacist(s) and doctor(s) at your next visit.

## 2017-08-05 NOTE — ED NOTES
Patient reports Morphine helped her pain a little bit. Her pain is down to 8 from 10 at this time.

## 2017-08-05 NOTE — ED PROVIDER NOTES
History     Chief Complaint:  Abdominal pain    HPI   Julia Smith is a 32 year old female with a history of abdominal surgery including total hysterectomy and salpingectomy, oophorectomy, and appendectomy  who presents with abdominal pain. The patient states that she has been experiencing lower abdominal pain and nausea for the past 24 hours. Of note, she had surgery 2 weeks ago to remove her right ovary with Dr. Wells of [facility]. Her pain has been described as a constant burning sensation that is worsened by movement and coughing. She has been taking oxycodone for her pain but it has not been effective in pain management. She was additionally experiencing hematuria and difficulty urinating last week for which she went to the emergency department and was given a bladder relaxant which alleviated these symptoms. She was experiencing some vaginal discharge following surgery which was not unusual for her, but it this was alleviated with prescribed antifungal medication. The patient denies currently experiencing any fever,  hematuria, chest pain, shortness of breath, back pain, or leg swelling, urinary symptoms.    Allergies:  Decadron  Depakote  Ibuprofen  Tramadol  Valpoic acid    Medications:    Naprosyn  Clonazepam  Methocarbamol  Phenergan  Sertraline  Lamictal  Roxicodone  Atarax  Fioricet  Adderall    Past Medical History:    Agoraphobia  Anxiety  Bipolar 2  Endometriosis  Fibromyalgia  GERD  Migraine  OCD  Chronic pain  PCOS  PTSD  TMJ    Past Surgical History:    Colonoscopy  Cystoscopy  DaVinci assisted ablation  DaVinci hysterectomy total  DaVinci Oophorectomy  Davinci pelvic procedure  DaVinci salpingectomy  Dilation and curettage  Laparoscopic appendectomy    Family History:    Depression    Social History:  Smoking Status: Yes  Smokeless Tobacco: No  Alcohol Use: Yes   Marital Status:   [2]    Review of Systems   Constitutional: Negative for fever.   Respiratory: Negative for shortness of  "breath.    Cardiovascular: Negative for chest pain and leg swelling.   Gastrointestinal: Positive for abdominal pain and nausea. Negative for diarrhea and vomiting.   Genitourinary: Positive for vaginal discharge. Negative for dysuria and hematuria.   Musculoskeletal: Negative for back pain.   All other systems reviewed and are negative.    Physical Exam   Vitals:  Patient Vitals for the past 24 hrs:   BP Temp Temp src Pulse Heart Rate Resp SpO2 Height   08/05/17 2020 103/61 - - - - - - -   08/05/17 1930 94/58 - - - - - - -   08/05/17 1900 99/63 - - - 61 - 97 % -   08/05/17 1848 103/66 - - - 62 - 97 % -   08/05/17 1830 95/57 - - - 60 - 96 % -   08/05/17 1335 103/63 98.3  F (36.8  C) Oral 84 - 14 98 % 1.626 m (5' 4\")      Physical Exam  General: No acute distress  Head:  Scalp, face, and head appear normal  Eyes:  Pupils are equal, round, and reactive to light    No nystagmus    Conjunctivae non-injected and sclerae white  ENT:    The external nose is normal    Pinnae are normal    The oropharynx is normal, mucous membranes moist    Uvula is in the midline  Neck:  Normal range of motion    There is no rigidity noted    Trachea is in the midline  CV:  Regular rate and rhythm     Normal S1/S2, no S3/S4    No murmur or rub  Resp:  Lungs are clear and equal bilaterally    There is no tachypnea    No increased work of breathing    No rales, wheezing, or rhonchi  GI:  Abdomen is soft, no rigidity or guarding    No distension, or mass    No rebound tenderness     Moderate tenderness to the suprapubic region and right lower quadrant. No CVA tenderness.  MS:  Normal muscular tone    Symmetric motor strength    No lower extremity edema  Skin:  5 well healed 1 cm surgical scars on the abdomen, C/D/I, no discharge bleeding or rash.  Neuro:  Awake and alert    Speech is normal and fluent    Moves all extremities spontaneously  Psych: Normal affect.  Appropriate interactions.     Emergency Department Course     Imaging:  Radiology " findings were communicated with the patient who voiced understanding of the findings.  CT Abdomen pelvis w contrast:  IMPRESSION: Moderate volume loculated fluid within the central pelvis  is new. This could be a seroma. It is difficult to completely exclude  infected fluid, though it has low density suggesting simple fluid. No  other acute abnormality.   Reading per radiology.     Laboratory:  Laboratory findings were communicated with the patient who voiced understanding of the findings.  CBC: AWNL (WBC 9.8, HGB 12.6, )   CMP: Albumin: 3.3(L), o/w WNL (Creatinine 0.73)   INR: 1.02  PTT 29  Lipase: 1193(H)  UA: Normal    Interventions:  1531 Zofran 4 mg IV   1531 Toradol 30 mg  IV  1717 Normal Saline 1000 mL IV   1804 Morphine sulfate 6 mg IV  Medications   ondansetron (ZOFRAN) injection 4 mg (4 mg Intravenous Given 8/5/17 1531)   Morphine Sulfate (PF) injection 6 mg (6 mg Intravenous Given 8/5/17 1804)   oxyCODONE (ROXICODONE) IR tablet 5 mg (not administered)   ketorolac (TORADOL) injection 30 mg (30 mg Intravenous Given 8/5/17 1531)   iopamidol (ISOVUE-370) solution 75 mL (75 mLs Intravenous Given 8/5/17 1547)   sodium chloride 0.9 % for CT scan flush dose 63 mL (63 mLs Intravenous Given 8/5/17 1548)   0.9% sodium chloride BOLUS (0 mLs Intravenous Stopped 8/5/17 1849)        Emergency Department Course:  Nursing notes and vitals reviewed.  I performed an exam of the patient as documented above.   IV was inserted and blood was drawn for laboratory testing, results above.   The patient provided a urine sample here in the emergency department. This was sent for laboratory testing, findings above.   The patient was sent for a CT while in the emergency department, results above.      1610 I rechecked with the patient    1724 I spoke with Dr. Wang regarding the patient.    1749 I spoke with Dr. Palm regarding the patient's continued care    1751 I spoke with the patient and discussed the plan for her  "care    1829 I spoke with Dr. Wang regarding the patient's CT results    1834 I spoke with Dr. Palm regarding the plan to forgo IR drain placement.    I discussed the treatment plan with the patient. They expressed understanding of this plan and consented to discharge. They will be discharged home with instructions for care and follow up. In addition, the patient will return to the emergency department if their symptoms persist, worsen, if new symptoms arise or if there is any concern.  All questions were answered.     I personally reviewed the laboratory results with the Patient and answered all related questions prior to discharge.    Impression & Plan      Medical Decision Making:  Julia Smith is a 32 year old female who presents with abdominal pain.  A broad differential diagnosis was considered including PUD vs gastritis vs pancreatitis vs perforated viscus vs bilary colic vs obstruction. Post surgical complications are also considered. As the patient felt that the quality of her symptoms was different we proceeded with CT scan of the abdomen and pelvis.  As well as laboratory studies.  CT scan showed possible seroma fluid collection initially measuring 8.5 x 5 cm.  I discussed the case with Dr. Mullins who is on-call for the patient's OB/GYN who felt that this large of a fluid collection may be related to patient's pain would likely benefit from possible IR drainage.  After discussion with Dr. Palm who reviewed the CT scan images he feels that the reported size of 3.5 x 5 cm is over estimated significantly given that the area of fluid is \"L-shaped\" and not spherical resulting in a much smaller fluid collection that is likely felt to be routine postsurgical fluid per Dr. Palm.  Patient exhibited no evidence of infection with normal white blood cell count and no history of fever.  Multiple doses of IV pain medicine were provided in the emergency department with improvement in the patient's symptoms.  " Antiemetics were also provided.  The patient's lipase is elevated however there is no radiographic evidence of pancreatitis she has no epigastric pain or upper abdominal or back pain whatsoever therefore clinically I feel that is not consistent with pancreatitis.  Elevated lipase is likely a sequela of her recent operative procedure.  Imaging and lab findings were discussed the patient.  The importance of close follow-up with her OB/GYN was stressed.  Patient was instructed to call and see her OB/GYN first thing Monday close follow-up.  Return precautions were provided patient was provided prescription for pain medicine for short course.    Diagnosis:    ICD-10-CM    1. Postoperative seroma involving genitourinary system after genitourinary procedure N99.842    2. Acute post-operative pain G89.18 oxyCODONE (ROXICODONE) 5 MG IR tablet     acetaminophen (TYLENOL) 500 MG tablet      Disposition:   Discharged    Discharge Medications:    Discharge Medication List as of 8/5/2017  8:02 PM      Oxycodone 5-10mg PRN    Scribe Disclosure:  Aleksander LEIGH, am serving as a scribe at 2:27 PM on 8/5/2017 to document services personally performed by Eliseo Gomez MD, based on my observations and the provider's statements to me.    EMERGENCY DEPARTMENT       Eliseo Gomez MD  08/06/17 0011

## 2017-08-05 NOTE — ED AVS SNAPSHOT
Emergency Department    [address] [facility] [address]    Phone:  [phone]    Fax:  [phone]                                       Julia Smith   MRN: [id number]    Department:   Emergency Department   Date of Visit:  8/5/2017           After Visit Summary Signature Page     I have received my discharge instructions, and my questions have been answered. I have discussed any challenges I see with this plan with the nurse or doctor.    ..........................................................................................................................................  Patient/Patient Representative Signature      ..........................................................................................................................................  Patient Representative Print Name and Relationship to Patient    ..................................................               ................................................  Date                                            Time    ..........................................................................................................................................  Reviewed by Signature/Title    ...................................................              ..............................................  Date                                                            Time

## 2017-08-06 NOTE — DISCHARGE INSTRUCTIONS
Follow up with your surgeon on Monday. Call to schedule an appointment. Your surgeon must prescribe future pain medications.      Postsurgical Seroma    A seroma is a sterile collection of fluid under the skin, usually at the site of a surgical incision. Fluid builds up under the skin where tissue was removed. It may form soon after your surgery. Or it may form up to about 1 to 2 weeks after surgery. It may look like a swollen lump and feel tender or sore.  A small seroma is not dangerous. Depending on its size and symptoms, it may not need to be treated. The seroma may go away on its own within a few weeks or months. Your body slowly absorbs the fluid. No medicine will make it go away faster. But if you have a large seroma or if it is causing pain, your healthcare provider may drain it. This is done with a syringe and needle. Or the provider may put in a drain. Seromas can return and may need to be drained multiple times. Rarely, you may need a minor procedure to remove the seroma. Long-term problems from a seroma are rare.  Home care  You may be given medicines to relieve pain. These may include acetaminophen and ibuprofen. Take these as directed. Check the seroma daily for the signs of infection listed below.  Follow-up care  Follow up with your healthcare provider, or as advised.  When to seek medical advice  Call your healthcare provider right away if you have signs of infection:    Fever of 100.4 F (38 C) or higher, or as directed by your healthcare provider    Seroma or skin around it feels warm    Pain in the seroma that gets worse    Redness or swelling that gets worse  Also call your provider right away if any of these occur:    Drainage from the seroma that is white or colored or very bloody. Clear or slightly bloody drainage is normal.    Wound opens up    Rapid heart rate    Shortness of breath  Date Last Reviewed: 1/1/2017 2000-2017 [employer]. [address] [place] [address], [place]  [address]. All rights reserved. This information is not intended as a substitute for professional medical care. Always follow your healthcare professional's instructions.

## 2017-09-04 ENCOUNTER — HOSPITAL ENCOUNTER (EMERGENCY)
Facility: CLINIC | Age: 32
Discharge: HOME | End: 2017-09-04
Attending: EMERGENCY MEDICINE | Admitting: EMERGENCY MEDICINE
Payer: COMMERCIAL

## 2017-09-04 ENCOUNTER — APPOINTMENT (OUTPATIENT)
Dept: CT IMAGING | Facility: CLINIC | Age: 32
End: 2017-09-04
Attending: EMERGENCY MEDICINE
Payer: COMMERCIAL

## 2017-09-04 VITALS
DIASTOLIC BLOOD PRESSURE: 86 MMHG | HEIGHT: 64 IN | SYSTOLIC BLOOD PRESSURE: 125 MMHG | WEIGHT: 140 LBS | OXYGEN SATURATION: 92 % | RESPIRATION RATE: 16 BRPM | TEMPERATURE: 98.4 F | BODY MASS INDEX: 23.9 KG/M2 | HEART RATE: 116 BPM

## 2017-09-04 DIAGNOSIS — N89.8 VAGINAL DISCHARGE: ICD-10-CM

## 2017-09-04 LAB
BASOPHILS # BLD AUTO: 0 10E9/L (ref 0–0.2)
BASOPHILS NFR BLD AUTO: 0.2 %
DIFFERENTIAL METHOD BLD: NORMAL
EOSINOPHIL # BLD AUTO: 0.2 10E9/L (ref 0–0.7)
EOSINOPHIL NFR BLD AUTO: 1.7 %
ERYTHROCYTE [DISTWIDTH] IN BLOOD BY AUTOMATED COUNT: 13.6 % (ref 10–15)
HCT VFR BLD AUTO: 37.2 % (ref 35–47)
HGB BLD-MCNC: 13.1 G/DL (ref 11.7–15.7)
IMM GRANULOCYTES # BLD: 0 10E9/L (ref 0–0.4)
IMM GRANULOCYTES NFR BLD: 0.2 %
LYMPHOCYTES # BLD AUTO: 3.5 10E9/L (ref 0.8–5.3)
LYMPHOCYTES NFR BLD AUTO: 39.8 %
MCH RBC QN AUTO: 31.2 PG (ref 26.5–33)
MCHC RBC AUTO-ENTMCNC: 35.2 G/DL (ref 31.5–36.5)
MCV RBC AUTO: 89 FL (ref 78–100)
MONOCYTES # BLD AUTO: 0.5 10E9/L (ref 0–1.3)
MONOCYTES NFR BLD AUTO: 5.2 %
NEUTROPHILS # BLD AUTO: 4.7 10E9/L (ref 1.6–8.3)
NEUTROPHILS NFR BLD AUTO: 52.9 %
NRBC # BLD AUTO: 0 10*3/UL
NRBC BLD AUTO-RTO: 0 /100
PLATELET # BLD AUTO: 371 10E9/L (ref 150–450)
RBC # BLD AUTO: 4.2 10E12/L (ref 3.8–5.2)
SPECIMEN SOURCE: NORMAL
WBC # BLD AUTO: 8.9 10E9/L (ref 4–11)
WET PREP SPEC: NORMAL

## 2017-09-04 PROCEDURE — 96375 TX/PRO/DX INJ NEW DRUG ADDON: CPT

## 2017-09-04 PROCEDURE — 25000128 H RX IP 250 OP 636: Performed by: EMERGENCY MEDICINE

## 2017-09-04 PROCEDURE — 87591 N.GONORRHOEAE DNA AMP PROB: CPT | Performed by: EMERGENCY MEDICINE

## 2017-09-04 PROCEDURE — 96376 TX/PRO/DX INJ SAME DRUG ADON: CPT

## 2017-09-04 PROCEDURE — 96374 THER/PROPH/DIAG INJ IV PUSH: CPT | Mod: 59

## 2017-09-04 PROCEDURE — 99285 EMERGENCY DEPT VISIT HI MDM: CPT | Mod: 25

## 2017-09-04 PROCEDURE — 87210 SMEAR WET MOUNT SALINE/INK: CPT | Performed by: EMERGENCY MEDICINE

## 2017-09-04 PROCEDURE — 87070 CULTURE OTHR SPECIMN AEROBIC: CPT | Performed by: EMERGENCY MEDICINE

## 2017-09-04 PROCEDURE — 87106 FUNGI IDENTIFICATION YEAST: CPT | Performed by: EMERGENCY MEDICINE

## 2017-09-04 PROCEDURE — 85025 COMPLETE CBC W/AUTO DIFF WBC: CPT | Performed by: EMERGENCY MEDICINE

## 2017-09-04 PROCEDURE — 87491 CHLMYD TRACH DNA AMP PROBE: CPT | Performed by: EMERGENCY MEDICINE

## 2017-09-04 PROCEDURE — 25000125 ZZHC RX 250: Performed by: EMERGENCY MEDICINE

## 2017-09-04 PROCEDURE — 74177 CT ABD & PELVIS W/CONTRAST: CPT

## 2017-09-04 RX ORDER — IBUPROFEN 600 MG/1
600 TABLET, FILM COATED ORAL EVERY 6 HOURS PRN
Qty: 30 TABLET | Refills: 1 | Status: SHIPPED | OUTPATIENT
Start: 2017-09-04 | End: 2017-09-20

## 2017-09-04 RX ORDER — HYDROMORPHONE HYDROCHLORIDE 1 MG/ML
0.5 INJECTION, SOLUTION INTRAMUSCULAR; INTRAVENOUS; SUBCUTANEOUS
Status: DISCONTINUED | OUTPATIENT
Start: 2017-09-04 | End: 2017-09-04 | Stop reason: HOSPADM

## 2017-09-04 RX ORDER — KETOROLAC TROMETHAMINE 15 MG/ML
15 INJECTION, SOLUTION INTRAMUSCULAR; INTRAVENOUS ONCE
Status: COMPLETED | OUTPATIENT
Start: 2017-09-04 | End: 2017-09-04

## 2017-09-04 RX ORDER — HYDROMORPHONE HYDROCHLORIDE 1 MG/ML
0.5 INJECTION, SOLUTION INTRAMUSCULAR; INTRAVENOUS; SUBCUTANEOUS
Status: COMPLETED | OUTPATIENT
Start: 2017-09-04 | End: 2017-09-04

## 2017-09-04 RX ORDER — OXYCODONE AND ACETAMINOPHEN 5; 325 MG/1; MG/1
1-2 TABLET ORAL EVERY 4 HOURS PRN
Qty: 15 TABLET | Refills: 0 | Status: SHIPPED | OUTPATIENT
Start: 2017-09-04 | End: 2017-09-20

## 2017-09-04 RX ORDER — IOPAMIDOL 755 MG/ML
71 INJECTION, SOLUTION INTRAVASCULAR ONCE
Status: COMPLETED | OUTPATIENT
Start: 2017-09-04 | End: 2017-09-04

## 2017-09-04 RX ADMIN — KETOROLAC TROMETHAMINE 15 MG: 15 INJECTION, SOLUTION INTRAMUSCULAR; INTRAVENOUS at 17:28

## 2017-09-04 RX ADMIN — IOPAMIDOL 71 ML: 755 INJECTION, SOLUTION INTRAVENOUS at 17:54

## 2017-09-04 RX ADMIN — HYDROMORPHONE HYDROCHLORIDE 0.5 MG: 1 INJECTION, SOLUTION INTRAMUSCULAR; INTRAVENOUS; SUBCUTANEOUS at 18:06

## 2017-09-04 RX ADMIN — SODIUM CHLORIDE 61 ML: 9 INJECTION, SOLUTION INTRAVENOUS at 17:54

## 2017-09-04 RX ADMIN — HYDROMORPHONE HYDROCHLORIDE 0.5 MG: 1 INJECTION, SOLUTION INTRAMUSCULAR; INTRAVENOUS; SUBCUTANEOUS at 17:28

## 2017-09-04 RX ADMIN — HYDROMORPHONE HYDROCHLORIDE 0.5 MG: 1 INJECTION, SOLUTION INTRAMUSCULAR; INTRAVENOUS; SUBCUTANEOUS at 18:21

## 2017-09-04 ASSESSMENT — ENCOUNTER SYMPTOMS
FEVER: 0
HEADACHES: 0
HEMATURIA: 0
AGITATION: 1
ABDOMINAL PAIN: 1

## 2017-09-04 NOTE — ED AVS SNAPSHOT
Emergency Department    [address] [facility] [address]    Phone:  [phone]    Fax:  [phone]                                       Julia Smith   MRN: [id number]    Department:   Emergency Department   Date of Visit:  9/4/2017           Patient Information     Date Of Birth          1985        Your diagnoses for this visit were:     Vaginal discharge        You were seen by Clara Sequeira MD.      Follow-up Information     Follow up with Justin Wells MD.    Specialty:  OB/Gyn    Why:  Call tomorrow for appt in 1-2 days    Contact information:    [facility]  [address]  [facility] [id number] [phone]          Discharge Instructions       Wound culture, gonorrhea, and chlamydia studies are pending. Return if fever or uncontrolled pain.     24 Hour Appointment Hotline       To make an appointment at any [facility] clinic, call [phone] ([phone]). If you don't have a family doctor or clinic, we will help you find one. [facility] clinics are conveniently located to serve the needs of you and your family.             Review of your medicines      START taking        Dose / Directions Last dose taken    ibuprofen 600 MG tablet   Commonly known as:  ADVIL/MOTRIN   Dose:  600 mg   Quantity:  30 tablet        Take 1 tablet (600 mg) by mouth every 6 hours as needed for moderate pain   Refills:  1        oxyCODONE-acetaminophen 5-325 MG per tablet   Commonly known as:  PERCOCET   Dose:  1-2 tablet   Quantity:  15 tablet        Take 1-2 tablets by mouth every 4 hours as needed for pain   Refills:  0          Our records show that you are taking the medicines listed below. If these are incorrect, please call your family doctor or clinic.        Dose / Directions Last dose taken    acetaminophen 500 MG tablet   Commonly known as:  TYLENOL   Dose:  1000 mg   Quantity:  30 tablet        Take 2 tablets (1,000 mg) by mouth every 8 hours as needed  for pain   Refills:  0        ADDERALL PO   Dose:  5 mg        Take 5 mg by mouth daily   Refills:  0        butalbital-acetaminophen-caffeine -40 MG per tablet   Commonly known as:  FIORICET/ESGIC   Dose:  1 tablet        Take 1 tablet by mouth 3 times daily as needed for headaches   Refills:  0        CLONAZEPAM PO   Dose:  1 mg        Take 1 mg by mouth At Bedtime   Refills:  0        hydrOXYzine 25 MG tablet   Commonly known as:  ATARAX   Dose:  25 mg   Quantity:  30 tablet        Take 1 tablet (25 mg) by mouth every 6 hours as needed for itching (and nausea and pain)   Refills:  0        LAMICTAL PO   Dose:  100 mg        Take 100 mg by mouth 2 times daily   Refills:  0        METHOCARBAMOL PO   Dose:  750 mg        Take 750 mg by mouth daily as needed for muscle spasms   Refills:  0        naproxen 500 MG tablet   Commonly known as:  NAPROSYN   Dose:  500 mg   Quantity:  50 tablet        Take 1 tablet (500 mg) by mouth 2 times daily as needed for moderate pain   Refills:  1        norethindrone-ethinyl estradiol 1-35 MG-MCG per tablet   Commonly known as:  ORTHO-NOVUM 1-35 TAB,NORTREL 1-35 TAB   Dose:  1 tablet        Take 1 tablet by mouth daily   Refills:  0        oxyCODONE 5 MG IR tablet   Commonly known as:  ROXICODONE   Dose:  5-10 mg   Quantity:  15 tablet        Take 1-2 tablets (5-10 mg) by mouth every 3 hours as needed for moderate to severe pain   Refills:  0        promethazine 25 MG tablet   Commonly known as:  PHENERGAN   Dose:  25 mg        Take 25 mg by mouth daily as needed for nausea   Refills:  0        SERTRALINE HCL PO   Dose:  100 mg        Take 100 mg by mouth At Bedtime   Refills:  0                Prescriptions were sent or printed at these locations (2 Prescriptions)                   Other Prescriptions                Printed at Department/Unit printer (2 of 2)         ibuprofen (ADVIL/MOTRIN) 600 MG tablet               oxyCODONE-acetaminophen (PERCOCET) 5-325 MG per tablet                 Procedures and tests performed during your visit     CBC with platelets differential    CT Abdomen Pelvis w Contrast    Chlamydia trachomatis PCR    Neisseria gonorrhoeae PCR    Wet prep    Wound Culture Aerobic Bacterial      Orders Needing Specimen Collection     None      Pending Results     Date and Time Order Name Status Description    9/4/2017 1800 Wound Culture Aerobic Bacterial In process     9/4/2017 1800 Chlamydia trachomatis PCR In process     9/4/2017 1741 Neisseria gonorrhoeae PCR In process     9/4/2017 1739 CT Abdomen Pelvis w Contrast Preliminary             Pending Culture Results     Date and Time Order Name Status Description    9/4/2017 1800 Wound Culture Aerobic Bacterial In process     9/4/2017 1800 Chlamydia trachomatis PCR In process     9/4/2017 1741 Neisseria gonorrhoeae PCR In process             Pending Results Instructions     If you had any lab results that were not finalized at the time of your Discharge, you can call the ED Lab Result RN at [phone]. You will be contacted by this team for any positive Lab results or changes in treatment. The nurses are available 7 days a week from 10A to 6:30P.  You can leave a message 24 hours per day and they will return your call.        Test Results From Your Hospital Stay        9/4/2017  5:44 PM      Component Results     Component Value Ref Range & Units Status    WBC 8.9 4.0 - 11.0 10e9/L Final    RBC Count 4.20 3.8 - 5.2 10e12/L Final    Hemoglobin 13.1 11.7 - 15.7 g/dL Final    Hematocrit 37.2 35.0 - 47.0 % Final    MCV 89 78 - 100 fl Final    MCH 31.2 26.5 - 33.0 pg Final    MCHC 35.2 31.5 - 36.5 g/dL Final    RDW 13.6 10.0 - 15.0 % Final    Platelet Count 371 150 - 450 10e9/L Final    Diff Method Automated Method  Final    % Neutrophils 52.9 % Final    % Lymphocytes 39.8 % Final    % Monocytes 5.2 % Final    % Eosinophils 1.7 % Final    % Basophils 0.2 % Final    % Immature Granulocytes 0.2 % Final    Nucleated RBCs 0 0  /100 Final    Absolute Neutrophil 4.7 1.6 - 8.3 10e9/L Final    Absolute Lymphocytes 3.5 0.8 - 5.3 10e9/L Final    Absolute Monocytes 0.5 0.0 - 1.3 10e9/L Final    Absolute Eosinophils 0.2 0.0 - 0.7 10e9/L Final    Absolute Basophils 0.0 0.0 - 0.2 10e9/L Final    Abs Immature Granulocytes 0.0 0 - 0.4 10e9/L Final    Absolute Nucleated RBC 0.0  Final         9/4/2017  6:08 PM      Narrative     CT ABDOMEN AND PELVIS WITH CONTRAST 9/4/2017 5:57 PM     HISTORY: Vaginal discharge after gynecologic surgery, history fluid  collection.    COMPARISON: 8/5/2017.    TECHNIQUE: Volumetric helical acquisition of CT images from the lung  bases through the symphysis pubis after the administration of 71 mL  Isovue -370  intravenous contrast. Radiation dose for this scan was  reduced using automated exposure control, adjustment of the mA and/or  kV according to patient size, or iterative reconstruction technique.    FINDINGS: Fluid in the pelvis is decreased in size now measuring 6.4 x  0.9 cm, previously 8.5 x 5.0 cm. No hydronephrosis. No bowel  obstruction. Normal caliber aorta. Gallbladder unremarkable. The  liver, spleen, adrenal glands, kidneys, and pancreas demonstrate no  worrisome focal lesion. The visualized lung bases are unremarkable.  Survey of the visualized bony structures demonstrates no destructive  bony lesions.        Impression     IMPRESSION: Marked improvement in fluid collection in the pelvis since  the comparison study, no acute process demonstrated.          9/4/2017  6:08 PM         9/4/2017  6:13 PM      Component Results     Component    Specimen Description    Vagina    Wet Prep    No Trichomonas seen    Wet Prep    No yeast seen    Wet Prep    No clue cells seen    Wet Prep    Few  PMNs seen           9/4/2017  6:12 PM         9/4/2017  6:08 PM                Clinical Quality Measure: Blood Pressure Screening     Your blood pressure was checked while you were in the emergency department today. The last  reading we obtained was  BP: 128/86 . Please read the guidelines below about what these numbers mean and what you should do about them.  If your systolic blood pressure (the top number) is less than 120 and your diastolic blood pressure (the bottom number) is less than 80, then your blood pressure is normal. There is nothing more that you need to do about it.  If your systolic blood pressure (the top number) is 120-139 or your diastolic blood pressure (the bottom number) is 80-89, your blood pressure may be higher than it should be. You should have your blood pressure rechecked within a year by a primary care provider.  If your systolic blood pressure (the top number) is 140 or greater or your diastolic blood pressure (the bottom number) is 90 or greater, you may have high blood pressure. High blood pressure is treatable, but if left untreated over time it can put you at risk for heart attack, stroke, or kidney failure. You should have your blood pressure rechecked by a primary care provider within the next 4 weeks.  If your provider in the emergency department today gave you specific instructions to follow-up with your doctor or provider even sooner than that, you should follow that instruction and not wait for up to 4 weeks for your follow-up visit.        Thank you for choosing [facility]       Thank you for choosing [facility] for your care. Our goal is always to provide you with excellent care. Hearing back from our patients is one way we can continue to improve our services. Please take a few minutes to complete the written survey that you may receive in the mail after you visit with us. Thank you!        [facility] Information     [email or web address] gives you secure access to your electronic health record. If you see a primary care provider, you can also send messages to your care team and make appointments. If you have questions, please call your primary care clinic.  If you do not have a primary care provider, please call  [phone] and they will assist you.        Care EveryWhere ID     This is your Care EveryWhere ID. This could be used by other organizations to access your [facility] medical records  [id number]        Equal Access to Services     CHICHI HUGHES : Marsha Perrin, waadrianneda tommy, qaanatta kaalmada cris, shimon thomas. So [facility] [phone].    ATENCIÓN: Si habla español, tiene a smith disposición servicios gratuitos de asistencia lingüística. Llame al [phone].    We comply with applicable federal civil rights laws and [place] laws. We do not discriminate on the basis of race, color, national origin, age, disability sex, sexual orientation or gender identity.            After Visit Summary       This is your record. Keep this with you and show to your community pharmacist(s) and doctor(s) at your next visit.

## 2017-09-04 NOTE — ED PROVIDER NOTES
History     Chief Complaint:  Vaginal pain    HPI   Julia Smith is a 32 year old female who presents with vaginal pain. . She had a LEONARDO last year complicated by post op fluid collection which required drainageShe reports that she thinks that she had vaginal abscess at that time that drained spontaneously. . In July, she had DaVinci right oophorectomy and was seen here in the ED  with CT below. . Today she presents stating that 2 days ago she developed swelling and pain on the right vaginal wall. She states this is a new type of pain and that she has been having yellow vaginal discharge as well. She reports diarrhea, nausea, and some slight abdominal pain, but denies any vomiting, history of STDs, or any other symptoms. She has been taking Aleve, Tylenol, and smoking marijuana for pain control with no relief of her symptoms. She was on oxycodone daily before this last surgery, but has not had any recently.     CT ABDOMEN PELVIS WITH CONTRAST   8/5/2017 3:51 PM  FINDINGS: There is moderate volume but slightly loculated fluid within  the pelvis. This is approximately 8.5 x 5 cm at the central pelvis  extending posteriorly towards the right series 2 image 76. This fluid  appears new since the prior exam. It has very low internal density  suggesting simple fluid.  IMPRESSION: Moderate volume loculated fluid within the central pelvis  is new. This could be a seroma. It is difficult to completely exclude  infected fluid, though it has low density suggesting simple fluid. No  other acute abnormality.    Allergies:  Decadron  Depakote  ibuprofen   Tramadol  Valproic Acid     Medications:    oxyCODONE (ROXICODONE) 5 MG IR tablet  acetaminophen (TYLENOL) 500 MG tablet  naproxen (NAPROSYN) 500 MG tablet  CLONAZEPAM PO  METHOCARBAMOL PO  promethazine (PHENERGAN) 25 MG tablet  SERTRALINE HCL PO  norethindrone-ethinyl estradiol (ORTHO-NOVUM 1-35 TAB,NORTREL 1-35 TAB) 1-35 MG-MCG per tablet  LamoTRIgine (LAMICTAL  "PO)  hydrOXYzine (ATARAX) 25 MG tablet  butalbital-acetaminophen-caffeine (FIORICET/ESGIC) -40 MG per tablet  Amphetamine-Dextroamphetamine (ADDERALL PO     Past Medical History:    Agoraphobia  Anxiety  Bipolar 2 disorder  Endometriosis  GERD  Fibromyalgia  Migraines  OCD  Panic attacks  PCOS  PTSD  TMJ    Past Surgical History:    Cystoscopy  Endometriosis ablation  Hysterectomy  Oophorectomy  Salpingectomy  D & C  Appendectomy    Family History:    Unknown, patient adopted    Social History:  Unemployed  Smoking status: Current smoker  Alcohol use: Yes  Sexually active with   Marijuana use: Yes  Marital Status:        Review of Systems   Constitutional: Negative for fever.   Gastrointestinal: Positive for abdominal pain.   Genitourinary: Positive for pelvic pain, vaginal discharge and vaginal pain. Negative for hematuria.   Neurological: Negative for headaches.   Psychiatric/Behavioral: Positive for agitation.   All other systems reviewed and are negative.      Physical Exam     Patient Vitals for the past 24 hrs:   BP Temp Temp src Pulse Resp SpO2 Height Weight   09/04/17 1915 - - - - - 92 % - -   09/04/17 1900 125/86 - - - - - - -   09/04/17 1830 (!) 133/97 - - - - 97 % - -   09/04/17 1730 128/86 - - - - 96 % - -   09/04/17 1645 129/72 98.4  F (36.9  C) Oral 116 16 95 % 1.626 m (5' 4\") 63.5 kg (140 lb)       Physical Exam  Constitutional:  Oriented to person, place, and time. Tearful complaining of 10/10 pain     Appears well-developed and well-nourished.   HENT:   Head:    Normocephalic and atraumatic.   Right Ear:   Tympanic membrane and external ear normal.   Left Ear:   Tympanic membrane and external ear normal.   Mouth/Throat:   Oropharynx is clear and moist.      Mucous membranes are normal.   Eyes:    Conjunctivae normal and EOM are normal.      Pupils are equal, round, and reactive to light.   Neck:    Normal range of motion. Neck supple.   Cardiovascular:  Normal rate, regular " rhythm, S1 normal and S2 normal.      No gallop and no friction rub. No murmur heard.  Pulmonary/Chest:  Breath sounds normal. No respiratory distress.      No wheezes. No rhonchi. No rales.   Abdominal:   Soft. No hepatosplenomegaly. No tenderness.      No rebound and no CVA tenderness.   Musculoskeletal:  Normal range of motion.   Neurological:   Alert and oriented to person, place, and time. Normal strength.      GCS eye subscore is 4. GCS verbal subscore is 5.      GCS motor subscore is 6.   Skin:    Skin is warm and dry.   Psychiatric:   Normal mood and affect.      Speech is normal and behavior is normal.      Judgment and thought content normal.      Cognition and memory are normal.   :   Blind end vagina. No abscess seen.       White Discharge    Emergency Department Course   Imaging:  Abdomen/Pelvis CT with IV contrast:  IMPRESSION: Marked improvement in fluid collection in the pelvis since  the comparison study, no acute process demonstrated.   Report per radiology.       Laboratory:  CBC:  WBC 8.9, HGB 13.1, , WNL     Wet Prep: Vagina, PMN's seen.  No Trichomonas seen.  No clue cells seen. No yeast seen.  Neisseria gonorrheae: pending  Chlamydia trachomatis: pending    Wound culture: pending     Interventions:  (1728) Dilaudid, 0.5 mg, IV injection  (1728) Toradol, 15 mg, IV injection  (1821) Dilaudid, 0.5 mg, IV injection      Emergency Department Course:  Nursing notes and vitals reviewed.  (1707) I performed an exam of the patient as documented above.    Blood was drawn from the patient. This was sent for laboratory testing, findings above.   Urine sample was obtained and sent for laboratory analysis, findings above.  The patient was sent for a CT scan while in the emergency department, findings above.     (1845) I revisited with the patient in their room to discuss results.    I spoke with the patient's OB/GYN who recommended outpatient follow up.    (1918) I revisited with the patient in  their room to discuss results.    Findings and plan explained to the patient. Patient discharged home with instructions regarding supportive care, medications, and reasons to return. The importance of close follow-up was reviewed. The patient was prescribed ibuprofen and percocet.     Impression & Plan      Medical Decision Making:  Julia Smith is a 32 year old female is a 32-year-old female with a complex GYN history. She  was status post LEONARDO last year which was complicated with fluid collection after which she did require drainage. In July she had DaVinci right oophorectomy. She was seen here on 8/5  with a CT scan that showed an 8 x 5 similar fluid collection which was treated conservatively. She comes in today noting significant vaginal discharge and feeling uncomfortable. She feels better after pain medications here. I can see whitish discharge in the vagina I do not see any obvious abscess in the vagina itself. CT scan shows the fluid collection from previous is much smaller. I talked to Dr. Tellez who suggests the patient be sent home with pain medication to follow up with Dr. Hegard the clinic in the next one or 2 days.    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 Wound culture chlamydia GC pending.    Disposition: Home. Discharge instructions: Ibuprofen and Percocet as needed call Dr. Hegard to be seen next 1-2 days. Follow-up sooner if fever or uncontrolled pain.    Diagnosis:    ICD-10-CM   1. Vaginal discharge N89.8       Disposition:  Patient is discharged to home.      Discharge Medications:  Discharge Medication List as of 9/4/2017  7:14 PM      START taking these medications    Details   ibuprofen (ADVIL/MOTRIN) 600 MG tablet Take 1 tablet (600 mg) by mouth every 6 hours as needed for moderate pain, Disp-30 tablet, R-1, Local Print      oxyCODONE-acetaminophen (PERCOCET) 5-325 MG per tablet Take 1-2 tablets by mouth  every 4 hours as needed for pain, Disp-15 tablet, R-0, Local Print               Bari Polanco  9/4/2017    EMERGENCY DEPARTMENT    I, Bari Polanco, am serving as a scribe on 9/4/2017 at 5:07 PM to personally document services performed by Dr. Sequeira based on my observations and the provider's statements to me.       Clara Sequeira MD  09/05/17 4078

## 2017-09-04 NOTE — ED AVS SNAPSHOT
Emergency Department    [address] [facility] [address]    Phone:  [phone]    Fax:  [phone]                                       Julia Smith   MRN: [id number]    Department:   Emergency Department   Date of Visit:  9/4/2017           After Visit Summary Signature Page     I have received my discharge instructions, and my questions have been answered. I have discussed any challenges I see with this plan with the nurse or doctor.    ..........................................................................................................................................  Patient/Patient Representative Signature      ..........................................................................................................................................  Patient Representative Print Name and Relationship to Patient    ..................................................               ................................................  Date                                            Time    ..........................................................................................................................................  Reviewed by Signature/Title    ...................................................              ..............................................  Date                                                            Time

## 2017-09-05 LAB
C TRACH DNA SPEC QL NAA+PROBE: NEGATIVE
N GONORRHOEA DNA SPEC QL NAA+PROBE: NEGATIVE
SPECIMEN SOURCE: NORMAL
SPECIMEN SOURCE: NORMAL

## 2017-09-05 NOTE — DISCHARGE INSTRUCTIONS
Wound culture, gonorrhea, and chlamydia studies are pending. Return if fever or uncontrolled pain.

## 2017-09-07 LAB
BACTERIA SPEC CULT: ABNORMAL
BACTERIA SPEC CULT: ABNORMAL
SPECIMEN SOURCE: ABNORMAL

## 2017-09-08 ENCOUNTER — TELEPHONE (OUTPATIENT)
Dept: EMERGENCY MEDICINE | Facility: CLINIC | Age: 32
End: 2017-09-08

## 2017-09-08 NOTE — TELEPHONE ENCOUNTER
[facility] Emergency Department Lab result notification:    [facility] ED lab result protocol used  General Culture    Reason for call  Notify of lab results, assess symptoms,  review ED providers recommendations/discharge instructions (if necessary) and advise per ED lab result f/u protocol    Lab Result  Final Wound culture report on 9/7/17  [facility] ED discharge antibiotic: None  Moderate growth Candida AND heavy growth normal urogenital meche  Incision and Drainage performed in [facility] ED [Yes / No]: No  Patient to be notified of result, symptoms's assessed and advised per [facility] ED lab result protocol.    Information table from ED Provider visit on 9/4/17  Symptoms reported at ED visit (Chief complaint, HPI) Julia Smith is a 32 year old female who presents with vaginal pain. . She had a LEONARDO last year complicated by post op fluid collection which required drainageShe reports that she thinks that she had vaginal abscess at that time that drained spontaneously. . In July, she had DaVinci right oophorectomy and was seen here in the ED  with CT below. . Today she presents stating that 2 days ago she developed swelling and pain on the right vaginal wall. She states this is a new type of pain and that she has been having yellow vaginal discharge as well. She reports diarrhea, nausea, and some slight abdominal pain, but denies any vomiting, history of STDs, or any other symptoms. She has been taking Aleve, Tylenol, and smoking marijuana for pain control with no relief of her symptoms. She was on oxycodone daily before this last surgery, but has not had any recently.    ED providers Impression and Plan (applicable information) Julia Smith is a 32 year old female is a 32-year-old female with a complex GYN history. She  was status post LEONARDO last year which was complicated with fluid collection after which she did require drainage. In July she had DaVinci right oophorectomy. She was seen here on  8/5  with a CT scan that showed an 8 x 5 similar fluid collection which was treated conservatively. She comes in today noting significant vaginal discharge and feeling uncomfortable. She feels better after pain medications here. I can see whitish discharge in the vagina I do not see any obvious abscess in the vagina itself. CT scan shows the fluid collection from previous is much smaller. I talked to Dr. Tellez who suggests the patient be sent home with pain medication to follow up with Dr. Hegard the clinic in the next one or 2 days.     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 Wound culture chlamydia GC pending.     Disposition: Home. Discharge instructions: Ibuprofen and Percocet as needed call Dr. Hegard to be seen next 1-2 days. Follow-up sooner if fever or uncontrolled pain.   Miscellaneous information Allergies: Decadron, Depakote, Ibuprofen, Tramadol, Valproic acid     RN Assessment (Patient s current Symptoms), include time called.  [Insert Left message here if message left]  Julia reports vaginal pain, continues as when in ED.  No new and no worsening symptoms.  Does have msg out to OB nurses for f/u appt with OB provider as recommended.  Not having typical symptoms as per her history with yeast infections.  Did advise OTC yeast treatment would be okay should she become symptomatic.     Please Contact your PCP clinic or return to the Emergency department if your:    Symptoms return.    Symptoms worsen or other concerning symptom's.    PCP follow-up Questions asked: NO    Johanny Nguyen RN    [facility] RN  Lung Nodule and ED Lab Results F/U RN  E[facility] [place] (ED late result f/u RN) : P [id number]   # [phone]    Copy of Lab result   Order   Wound Culture Aerobic Bacterial [[id number]] (Order [id number])   Exam Information   Exam Date Exam Time Accession # Results    9/4/17  5:41 PM [id number]    Component Results    Component Collected Lab   Specimen Description 09/04/2017  5:41    Vagina Wound   Culture Micro (Abnormal) 09/04/2017  5:41    Moderate growth   Candida albicans / dubliniensis   Candida albicans and Candida dubliniensis are not routinely speciated   Susceptibility testing not routinely done      Culture Micro 09/04/2017  5:41    Heavy growth   Normal urogenital meche

## 2017-09-10 ENCOUNTER — HOSPITAL ENCOUNTER (EMERGENCY)
Facility: CLINIC | Age: 32
Discharge: HOME | End: 2017-09-10
Attending: EMERGENCY MEDICINE | Admitting: EMERGENCY MEDICINE
Payer: COMMERCIAL

## 2017-09-10 VITALS
SYSTOLIC BLOOD PRESSURE: 108 MMHG | WEIGHT: 139.99 LBS | OXYGEN SATURATION: 97 % | HEIGHT: 64 IN | BODY MASS INDEX: 23.9 KG/M2 | RESPIRATION RATE: 16 BRPM | DIASTOLIC BLOOD PRESSURE: 76 MMHG | TEMPERATURE: 98.4 F

## 2017-09-10 DIAGNOSIS — R10.2 PELVIC PAIN IN FEMALE: ICD-10-CM

## 2017-09-10 DIAGNOSIS — B37.31 YEAST INFECTION OF THE VAGINA: ICD-10-CM

## 2017-09-10 LAB
SPECIMEN SOURCE: NORMAL
WET PREP SPEC: NORMAL

## 2017-09-10 PROCEDURE — 87591 N.GONORRHOEAE DNA AMP PROB: CPT | Performed by: EMERGENCY MEDICINE

## 2017-09-10 PROCEDURE — 87491 CHLMYD TRACH DNA AMP PROBE: CPT | Performed by: EMERGENCY MEDICINE

## 2017-09-10 PROCEDURE — 87210 SMEAR WET MOUNT SALINE/INK: CPT | Performed by: EMERGENCY MEDICINE

## 2017-09-10 PROCEDURE — 99284 EMERGENCY DEPT VISIT MOD MDM: CPT

## 2017-09-10 PROCEDURE — 25000132 ZZH RX MED GY IP 250 OP 250 PS 637: Performed by: EMERGENCY MEDICINE

## 2017-09-10 RX ORDER — GABAPENTIN 300 MG/1
CAPSULE ORAL
Qty: 90 CAPSULE | Refills: 0 | Status: SHIPPED | OUTPATIENT
Start: 2017-09-10 | End: 2018-05-04

## 2017-09-10 RX ORDER — OXYCODONE AND ACETAMINOPHEN 5; 325 MG/1; MG/1
1 TABLET ORAL ONCE
Status: COMPLETED | OUTPATIENT
Start: 2017-09-10 | End: 2017-09-10

## 2017-09-10 RX ORDER — LORAZEPAM 0.5 MG/1
1 TABLET ORAL ONCE
Status: COMPLETED | OUTPATIENT
Start: 2017-09-10 | End: 2017-09-10

## 2017-09-10 RX ORDER — GABAPENTIN 300 MG/1
300 CAPSULE ORAL ONCE
Status: COMPLETED | OUTPATIENT
Start: 2017-09-10 | End: 2017-09-10

## 2017-09-10 RX ADMIN — LORAZEPAM 1 MG: 0.5 TABLET ORAL at 15:58

## 2017-09-10 RX ADMIN — OXYCODONE HYDROCHLORIDE AND ACETAMINOPHEN 1 TABLET: 5; 325 TABLET ORAL at 15:59

## 2017-09-10 RX ADMIN — GABAPENTIN 300 MG: 300 CAPSULE ORAL at 18:00

## 2017-09-10 RX ADMIN — OXYCODONE HYDROCHLORIDE AND ACETAMINOPHEN 1 TABLET: 5; 325 TABLET ORAL at 17:10

## 2017-09-10 ASSESSMENT — ENCOUNTER SYMPTOMS: ABDOMINAL PAIN: 1

## 2017-09-10 NOTE — DISCHARGE INSTRUCTIONS
Candida Vaginal Infection    You have a Candida vaginal infection. This is also known as a yeast infection. It is most often caused by a type of yeast (fungus) called Candida. Candida are normally found in the vagina. But if they increase in number, this can lead to infection and cause symptoms.  Symptoms of a yeast infection can include:    Clumpy or thin, white discharge, which may look like cottage cheese    Itching or burning    Burning with urination  Certain factors can make a yeast infection more likely. These can include:    Taking certain medicines, such as antibiotics or birth control pills    Pregnancy    Diabetes    Weakened immune system  A yeast infection is most often treated with antifungal medicine. This may be given as a vaginal cream or pills you take by mouth. Treatment may last for about 1 to 7 days. Women with severe or recurrent infections may need longer courses of treatment.  Home care    If you re prescribed medicine, be sure to use it as directed. Finish all of the medicine, even if your symptoms go away. Note: Don t try to treat yourself using over-the-counter products without talking to your provider first. He or she will let you know if this is a good option for you.    Ask your provider what steps you can take to help reduce your risk of having a yeast infection in the future.  Follow-up care  Follow up with your healthcare provider, or as directed.  When to seek medical advice  Call your healthcare provider right away if:    You have a fever of 100.4 F (38 C) or higher, or as directed by your provider.    Your symptoms worsen, or they don t go away within a few days of starting treatment.    You have new pain in the lower belly or pelvic region.    You have side effects that bother you or a reaction to the cream or pills you re prescribed.    You or any partners you have sex with have new symptoms, such as a rash, joint pain, or sores.  Date Last Reviewed: 7/30/2015 2000-2017 [employer]. [address] [place] [address], [place] [address]. All rights reserved. This information is not intended as a substitute for professional medical care. Always follow your healthcare professional's instructions.

## 2017-09-10 NOTE — ED PROVIDER NOTES
History     Chief Complaint:  Groin Swelling     HPI   Julia Smith is a 32 year old female who presents with groin swelling and severe vaginal pain. The patient endorses severe vaginal pain with groin swelling, mild vaginal discharge, mild lower abdominal pain with tenderness, and difficulty sleeping due to pain starting last week. The patient came in to the ED on 9/4/2017 and urine culture tests came back positive for yeast, so she was called and prescribed with 2 pills of Diflucan over 3 days which she started on Friday, 9/8/2017, and prescribed Percocet for the pain. The pain endorses an right oophorectomy on 7/19/2017 and complicated GYN surgical history. The patient denies vaginal itchiness, vaginal burning, sexually active behavior in the last week, or current medication.    Allergies:  Decadron [Dexamethasone]  Depakote [Divalproex Sodium]  Ibuprofen  Tramadol  Valproic Acid    Medications:    Neurontin  Motrin  Percocet  Roxicodone  Tylenol  Naprosyn  Clonazepam  Methocarbamol  Phenergan  Sertraline   Norethindrone-ethinyl estradiol  Lamictal  Atarax  Fiorecet  Adderall    Past Medical History:    Agoraphobia   Anxiety, generalized   Bipolar 2 disorder  Endometriosis   Fibromyalgia   Gastro-oesophageal reflux disease   Migraine   OCD (obsessive compulsive disorder)   Other chronic pain   Panic attacks   PCOS (polycystic ovarian syndrome)   PTSD (post-traumatic stress disorder)   TMJ (dislocation of temporomandibular joint)     Past Surgical History:    DaVinci Assisted ablation/excision of endometriosis  DaVinci hysterectomy, total  DaVinci Oophorectomy  DaVinci Salpingectomy  DaVinci Salpingo-Oophorectomy including bilateral   Laparoscopic appendectomy    Family History:    Mother: Depression  Maternal Grandmother: Depression  Limited family history due to adoption.    Social History:  Smoking Status: Current Every Day Smoker, 0.5 PPD  Smokeless Tobacco: Never Used  Alcohol Use: Positive  Marital  "Status:   [2]     Review of Systems   Gastrointestinal: Positive for abdominal pain (lower abdomen, mild).   Genitourinary: Positive for vaginal discharge (mild) and vaginal pain.        No vaginal itchiness, no vaginal burning   All other systems reviewed and are negative.    Physical Exam   Vitals:  Patient Vitals for the past 24 hrs:   BP Temp Temp src Heart Rate Resp SpO2 Height Weight   09/10/17 1846 108/76 - - - - 97 % - -   09/10/17 1800 120/84 - - - - 97 % - -   09/10/17 1700 130/90 - - - - - - -   09/10/17 1645 - - - - - 97 % - -   09/10/17 1630 112/80 - - - - 98 % - -   09/10/17 1600 126/76 - - - - 97 % - -   09/10/17 1546 - - - - - 98 % - -   09/10/17 1530 112/85 - - - - 97 % - 63.5 kg (139 lb 15.9 oz)   09/10/17 1449 123/78 98.4  F (36.9  C) Oral 129 16 97 % 1.626 m (5' 4\") 63.5 kg (140 lb)     Physical Exam  GENERAL: well developed, pleasant. Tearful and anxious.   HEAD: atraumatic  EYES: pupils reactive, extraocular muscles intact, conjunctivae normal  ENT:  mucus membranes moist  NECK:  trachea midline, normal range of motion  RESPIRATORY: no tachypnea, breath sounds clear to auscultation   CVS: normal S1/S2, no murmurs, intact distal pulses  ABDOMEN: soft, nontender, nondistention  MUSCULOSKELETAL: no deformities  SKIN: warm and dry, no acute rashes or ulceration  NEURO: GCS 15, cranial nerves intact, alert and oriented x3  PSYCH:  Mood/affect normal    Emergency Department Course     Laboratory:  Laboratory findings were communicated with the patient who voiced understanding of the findings.    Wet prep: No Trichomonas seen, no yeast seen, no clue cells seen, moderate PMNs seen  Chlamydia PCR: Pending  Neisseria PCR: Pending    Interventions:  1558 Ativan 1 mg PO  1559 Percocet 1 tablet PO    Emergency Department Course:  1449 Nursing notes and vitals reviewed.  1543 I performed an exam of the patient as documented above.   I discussed the treatment plan with the patient. They expressed " understanding of this plan and consented to discharge. They will be discharged home with instructions for care and follow up. In addition, the patient will return to the emergency department if their symptoms persist, worsen, if new symptoms arise or if there is any concern.  All questions were answered.  I personally reviewed the laboratory results with the patient and answered all related questions prior to discharge.    Impression & Plan      Medical Decision Making:  Julia Smith is a 32 year old female who presents to the emergency department today for evaluation of groin swelling. The patient has a complex history including chronic pain, endometriosis, and surgery in July with CT dated 8/5 that showed a seroma and repeat CT on 9/4 that showed improvement of the seroma. She had a pelvic exam on 9/4 that showed yeast and was started on Diflucan for 1 dose to date. She complains of significant pelvic and vaginal pain. She has no other problems warranting scan in term of uterus, ovaries, or appendix. This looks to be gynecologic and vaginal in nature. I do not see evidence of herpes. I do see white discharge and wet prep shows yeast. She was requesting to be unconscious with sedation to do the pelvic exam which I did not think was appropriate. She was given percocet and ativan by mouth. She will follow-up with OB. I do not feel the need to do any further imaging or labs. Patient is safe for discharge home.        Diagnosis:    ICD-10-CM    1. Yeast infection of the vagina B37.3    2. Pelvic pain in female R10.2      Disposition:   The patient is discharged to home.    Discharge Medications:  Discharge Medication List as of 9/10/2017  6:40 PM      START taking these medications    Details   gabapentin (NEURONTIN) 300 MG capsule Take 1 tablet (300 mg) every night for 1-3 days, then 1 tablet twice daily for 1-3 days, then 1 tablet three times daily, Disp-90 capsule, R-0, Local Print             Scribe  Disclosure:  I, Mandi Adi, am serving as a scribe at 3:47 PM on 9/10/2017 to document services personally performed by Yinka Silver MD based on my observations and the provider's statements to me.       EMERGENCY DEPARTMENT       Yinka Silver MD  09/12/17 1092

## 2017-09-10 NOTE — ED AVS SNAPSHOT
Emergency Department    [address] [facility] [address]    Phone:  [phone]    Fax:  [phone]                                       Julia Smith   MRN: [id number]    Department:   Emergency Department   Date of Visit:  9/10/2017           Patient Information     Date Of Birth          1985        Your diagnoses for this visit were:     Yeast infection of the vagina     Pelvic pain in female        You were seen by Yinka Silver MD.      Follow-up Information     Follow up with Justin Wells MD.    Specialty:  OB/Gyn    Contact information:    [facility]  [address]  [place] [address] [phone]          Discharge Instructions         Candida Vaginal Infection    You have a Candida vaginal infection. This is also known as a yeast infection. It is most often caused by a type of yeast (fungus) called Candida. Candida are normally found in the vagina. But if they increase in number, this can lead to infection and cause symptoms.  Symptoms of a yeast infection can include:    Clumpy or thin, white discharge, which may look like cottage cheese    Itching or burning    Burning with urination  Certain factors can make a yeast infection more likely. These can include:    Taking certain medicines, such as antibiotics or birth control pills    Pregnancy    Diabetes    Weakened immune system  A yeast infection is most often treated with antifungal medicine. This may be given as a vaginal cream or pills you take by mouth. Treatment may last for about 1 to 7 days. Women with severe or recurrent infections may need longer courses of treatment.  Home care    If you re prescribed medicine, be sure to use it as directed. Finish all of the medicine, even if your symptoms go away. Note: Don t try to treat yourself using over-the-counter products without talking to your provider first. He or she will let you know if this is a good option for you.    Ask your  provider what steps you can take to help reduce your risk of having a yeast infection in the future.  Follow-up care  Follow up with your healthcare provider, or as directed.  When to seek medical advice  Call your healthcare provider right away if:    You have a fever of 100.4 F (38 C) or higher, or as directed by your provider.    Your symptoms worsen, or they don t go away within a few days of starting treatment.    You have new pain in the lower belly or pelvic region.    You have side effects that bother you or a reaction to the cream or pills you re prescribed.    You or any partners you have sex with have new symptoms, such as a rash, joint pain, or sores.  Date Last Reviewed: 7/30/2015 2000-2017 [employer]. [address] [place] [address], [place] [address]. All rights reserved. This information is not intended as a substitute for professional medical care. Always follow your healthcare professional's instructions.          24 Hour Appointment Hotline       To make an appointment at any [facility], call [phone] ([phone]). If you don't have a family doctor or clinic, we will help you find one. [facility] clinics are conveniently located to serve the needs of you and your family.             Review of your medicines      START taking        Dose / Directions Last dose taken    gabapentin 300 MG capsule   Commonly known as:  NEURONTIN   Quantity:  90 capsule        Take 1 tablet (300 mg) every night for 1-3 days, then 1 tablet twice daily for 1-3 days, then 1 tablet three times daily   Refills:  0          Our records show that you are taking the medicines listed below. If these are incorrect, please call your family doctor or clinic.        Dose / Directions Last dose taken    acetaminophen 500 MG tablet   Commonly known as:  TYLENOL   Dose:  1000 mg   Quantity:  30 tablet        Take 2 tablets (1,000 mg) by mouth every 8 hours as needed for pain   Refills:  0        ADDERALL PO    Dose:  5 mg        Take 5 mg by mouth daily   Refills:  0        butalbital-acetaminophen-caffeine -40 MG per tablet   Commonly known as:  FIORICET/ESGIC   Dose:  1 tablet        Take 1 tablet by mouth 3 times daily as needed for headaches   Refills:  0        CLONAZEPAM PO   Dose:  1 mg        Take 1 mg by mouth At Bedtime   Refills:  0        hydrOXYzine 25 MG tablet   Commonly known as:  ATARAX   Dose:  25 mg   Quantity:  30 tablet        Take 1 tablet (25 mg) by mouth every 6 hours as needed for itching (and nausea and pain)   Refills:  0        ibuprofen 600 MG tablet   Commonly known as:  ADVIL/MOTRIN   Dose:  600 mg   Quantity:  30 tablet        Take 1 tablet (600 mg) by mouth every 6 hours as needed for moderate pain   Refills:  1        LAMICTAL PO   Dose:  100 mg        Take 100 mg by mouth 2 times daily   Refills:  0        METHOCARBAMOL PO   Dose:  750 mg        Take 750 mg by mouth daily as needed for muscle spasms   Refills:  0        naproxen 500 MG tablet   Commonly known as:  NAPROSYN   Dose:  500 mg   Quantity:  50 tablet        Take 1 tablet (500 mg) by mouth 2 times daily as needed for moderate pain   Refills:  1        norethindrone-ethinyl estradiol 1-35 MG-MCG per tablet   Commonly known as:  ORTHO-NOVUM 1-35 TAB,NORTREL 1-35 TAB   Dose:  1 tablet        Take 1 tablet by mouth daily   Refills:  0        oxyCODONE 5 MG IR tablet   Commonly known as:  ROXICODONE   Dose:  5-10 mg   Quantity:  15 tablet        Take 1-2 tablets (5-10 mg) by mouth every 3 hours as needed for moderate to severe pain   Refills:  0        oxyCODONE-acetaminophen 5-325 MG per tablet   Commonly known as:  PERCOCET   Dose:  1-2 tablet   Quantity:  15 tablet        Take 1-2 tablets by mouth every 4 hours as needed for pain   Refills:  0        promethazine 25 MG tablet   Commonly known as:  PHENERGAN   Dose:  25 mg        Take 25 mg by mouth daily as needed for nausea   Refills:  0        SERTRALINE HCL PO   Dose:   100 mg        Take 100 mg by mouth At Bedtime   Refills:  0                Prescriptions were sent or printed at these locations (1 Prescription)                   Other Prescriptions                Printed at Department/Unit printer (1 of 1)         gabapentin (NEURONTIN) 300 MG capsule                Procedures and tests performed during your visit     Chlamydia trachomatis PCR    Neisseria gonorrhoea PCR    Prep for procedure - pelvic exam    Wet prep      Orders Needing Specimen Collection     None      Pending Results     Date and Time Order Name Status Description    9/10/2017 1550 Neisseria gonorrhoea PCR In process     9/10/2017 1550 Chlamydia trachomatis PCR In process             Pending Culture Results     Date and Time Order Name Status Description    9/10/2017 1550 Neisseria gonorrhoea PCR In process     9/10/2017 1550 Chlamydia trachomatis PCR In process             Pending Results Instructions     If you had any lab results that were not finalized at the time of your Discharge, you can call the ED Lab Result RN at [phone]. You will be contacted by this team for any positive Lab results or changes in treatment. The nurses are available 7 days a week from 10A to 6:30P.  You can leave a message 24 hours per day and they will return your call.        Test Results From Your Hospital Stay        9/10/2017  5:41 PM      Component Results     Component    Specimen Description    Vagina    Wet Prep    No Trichomonas seen    Wet Prep    No yeast seen    Wet Prep    No clue cells seen    Wet Prep    Moderate  PMNs seen           9/10/2017  5:24 PM         9/10/2017  5:24 PM                Clinical Quality Measure: Blood Pressure Screening     Your blood pressure was checked while you were in the emergency department today. The last reading we obtained was  BP: 120/84 . Please read the guidelines below about what these numbers mean and what you should do about them.  If your systolic blood pressure (the top  number) is less than 120 and your diastolic blood pressure (the bottom number) is less than 80, then your blood pressure is normal. There is nothing more that you need to do about it.  If your systolic blood pressure (the top number) is 120-139 or your diastolic blood pressure (the bottom number) is 80-89, your blood pressure may be higher than it should be. You should have your blood pressure rechecked within a year by a primary care provider.  If your systolic blood pressure (the top number) is 140 or greater or your diastolic blood pressure (the bottom number) is 90 or greater, you may have high blood pressure. High blood pressure is treatable, but if left untreated over time it can put you at risk for heart attack, stroke, or kidney failure. You should have your blood pressure rechecked by a primary care provider within the next 4 weeks.  If your provider in the emergency department today gave you specific instructions to follow-up with your doctor or provider even sooner than that, you should follow that instruction and not wait for up to 4 weeks for your follow-up visit.        Thank you for choosing [facility]       Thank you for choosing [facility] for your care. Our goal is always to provide you with excellent care. Hearing back from our patients is one way we can continue to improve our services. Please take a few minutes to complete the written survey that you may receive in the mail after you visit with us. Thank you!        [employer] Information     [employer] gives you secure access to your electronic health record. If you see a primary care provider, you can also send messages to your care team and make appointments. If you have questions, please call your primary care clinic.  If you do not have a primary care provider, please call [phone] and they will assist you.        Care EveryWhere ID     This is your Care EveryWhere ID. This could be used by other organizations to access your [facility] medical  records  [id number]        Equal Access to Services     CHICHI HUGHES : Marsha Perrin, john sanders, shimon solares. So [facility] [phone].    ATENCIÓN: Si habla español, tiene a smith disposición servicios gratuitos de asistencia lingüística. Llame al [phone].    We comply with applicable federal civil rights laws and [place] laws. We do not discriminate on the basis of race, color, national origin, age, disability sex, sexual orientation or gender identity.            After Visit Summary       This is your record. Keep this with you and show to your community pharmacist(s) and doctor(s) at your next visit.

## 2017-09-10 NOTE — ED AVS SNAPSHOT
Emergency Department    [address] [facility] [address]    Phone:  [phone]    Fax:  [phone]                                       Julia Smith   MRN: [id number]    Department:   Emergency Department   Date of Visit:  9/10/2017           After Visit Summary Signature Page     I have received my discharge instructions, and my questions have been answered. I have discussed any challenges I see with this plan with the nurse or doctor.    ..........................................................................................................................................  Patient/Patient Representative Signature      ..........................................................................................................................................  Patient Representative Print Name and Relationship to Patient    ..................................................               ................................................  Date                                            Time    ..........................................................................................................................................  Reviewed by Signature/Title    ...................................................              ..............................................  Date                                                            Time

## 2017-09-19 ENCOUNTER — MEDICAL CORRESPONDENCE (OUTPATIENT)
Dept: EMERGENCY MEDICINE | Facility: CLINIC | Age: 32
End: 2017-09-19

## 2017-09-20 ENCOUNTER — OFFICE VISIT (OUTPATIENT)
Dept: FAMILY MEDICINE | Facility: CLINIC | Age: 32
End: 2017-09-20
Payer: COMMERCIAL

## 2017-09-20 ENCOUNTER — RADIANT APPOINTMENT (OUTPATIENT)
Dept: GENERAL RADIOLOGY | Facility: CLINIC | Age: 32
End: 2017-09-20
Attending: INTERNAL MEDICINE
Payer: COMMERCIAL

## 2017-09-20 VITALS
BODY MASS INDEX: 24.37 KG/M2 | TEMPERATURE: 98.7 F | HEART RATE: 104 BPM | WEIGHT: 142 LBS | OXYGEN SATURATION: 98 % | DIASTOLIC BLOOD PRESSURE: 70 MMHG | SYSTOLIC BLOOD PRESSURE: 100 MMHG

## 2017-09-20 DIAGNOSIS — J22 LRTI (LOWER RESPIRATORY TRACT INFECTION): Primary | ICD-10-CM

## 2017-09-20 DIAGNOSIS — Z71.6 ENCOUNTER FOR TOBACCO USE CESSATION COUNSELING: ICD-10-CM

## 2017-09-20 DIAGNOSIS — R05.9 COUGH: ICD-10-CM

## 2017-09-20 PROCEDURE — 71020 XR CHEST 2 VW: CPT

## 2017-09-20 PROCEDURE — 99214 OFFICE O/P EST MOD 30 MIN: CPT | Performed by: INTERNAL MEDICINE

## 2017-09-20 RX ORDER — DOXYCYCLINE 100 MG/1
100 CAPSULE ORAL 2 TIMES DAILY
Qty: 14 CAPSULE | Refills: 0 | Status: SHIPPED | OUTPATIENT
Start: 2017-09-20 | End: 2018-01-26

## 2017-09-20 RX ORDER — CODEINE PHOSPHATE AND GUAIFENESIN 10; 100 MG/5ML; MG/5ML
1-2 SOLUTION ORAL EVERY 4 HOURS PRN
Qty: 120 ML | Refills: 0 | Status: SHIPPED | OUTPATIENT
Start: 2017-09-20 | End: 2018-01-26

## 2017-09-20 NOTE — NURSING NOTE
"Chief Complaint   Patient presents with     Cough       Initial /70 (BP Location: Left arm, Patient Position: Chair, Cuff Size: Adult Regular)  Pulse 109  Temp 98.7  F (37.1  C) (Tympanic)  Wt 142 lb (64.4 kg)  LMP 01/03/2014  SpO2 98%  BMI 24.37 kg/m2 Estimated body mass index is 24.37 kg/(m^2) as calculated from the following:    Height as of 9/10/17: 5' 4\" (1.626 m).    Weight as of this encounter: 142 lb (64.4 kg).  Medication Reconciliation: complete  "

## 2017-09-20 NOTE — PROGRESS NOTES
SUBJECTIVE:   Julia Smith is a 32 year old female who presents to clinic today for the following health issues:      Acute Illness   Acute illness concerns: cough  Onset: on and off since July     Fever: YES- here and there     Chills/Sweats: no    Headache (location?): YES    Sinus Pressure:no    Conjunctivitis:  no    Ear Pain: YES: left    Rhinorrhea: YES    Congestion: YES    Sore Throat: YES     Cough: YES    Wheeze: no    Decreased Appetite: no    Nausea: no    Vomiting: no    Diarrhea:  YES    Dysuria/Freq.: no    Fatigue/Achiness: YES    Sick/Strep Exposure: no     Therapies Tried and outcome: nyquil, dayquil, sudafed, garlic, vicks, zycam, alkaseltzer cold and cough     Julia is here with a cough which has been going on for the past 2 months. She noticed that this started after her GYN surgery and using inspiratory spirometer. Some days are better than others. It is often junky and she does cough up green/gray sputum. She continues to smoke about a half pack per day. She notices some shortness of breath when she is exerting herself. She's had temps up to 101 here and there, the last fever was a week ago. She has a little bit of sinus drainage, but not much.    She would like to quit smoking. Her  also smokes and is not motivated to quit at this time, which is a barrier. She has used Wellbutrin (didn't work well with her other psych meds) and Chantix (Had bad side effects) in the past. Patches do not seem to stay on her skin.         Problem list and histories reviewed & adjusted, as indicated.      Reviewed and updated as needed this visit by clinical staffTobacco  Allergies  Meds       Reviewed and updated as needed this visit by Provider  Meds         ROS:  Constitutional, HEENT, cardiovascular, pulmonary, gi and gu systems are negative, except as otherwise noted.      OBJECTIVE:   /70 (BP Location: Left arm, Patient Position: Chair, Cuff Size: Adult Regular)  Pulse 104  Temp  98.7  F (37.1  C) (Tympanic)  Wt 142 lb (64.4 kg)  LMP 01/03/2014  SpO2 98%  BMI 24.37 kg/m2  Body mass index is 24.37 kg/(m^2).    Gen: well appearing, pleasant woman, no distress  HEENT: PERRL, no conjunctival injection, no posterior pharynx erythema, MMM.  TM normal b/l.     Neck: supple, no LAD  Pulm: + junky sounding cough, CTAB, no wheezes or rales  CV: tachycardic, regular, normal S1 and S2, no murmurs  Ext: 2+ radial pulses        Diagnostic Test Results:  CXR - unremarkable    ASSESSMENT/PLAN:       1. LRTI (lower respiratory tract infection)  Chest x-ray is unremarkable. Given the sputum production and persistence of the cough over 2 months, will treat with course of antibiotics. Robitussin with codeine to use at night. Counseled that cough will likely take several weeks to resolve completely given her smoking.  - XR Chest 2 Views; Future  - doxycycline (VIBRAMYCIN) 100 MG capsule; Take 1 capsule (100 mg) by mouth 2 times daily  Dispense: 14 capsule; Refill: 0  - guaiFENesin-codeine (ROBITUSSIN AC) 100-10 MG/5ML SOLN solution; Take 5-10 mLs by mouth every 4 hours as needed for cough  Dispense: 120 mL; Refill: 0    2. Encounter for tobacco use cessation counseling  We discussed options for smoking cessation and she would like to try the inhaler. Consult regarding behavioral aspect to smoking cessation as well. Certainly her  continuing to smoke is a significant barrier.  - nicotine (NICOTROL) 10 MG Inhaler; Inhale 6-16 Cartridges into the lungs daily as needed for smoking cessation  Dispense: 180 each; Refill: 3    Follow up if no improvement with antibiotic course.     Jillian Palma MD  [facility]

## 2017-09-20 NOTE — MR AVS SNAPSHOT
After Visit Summary   9/20/2017    Julia Simth    MRN: [id number]           Patient Information     Date Of Birth          1985        Visit Information        Provider Department      9/20/2017 12:00 PM Jillian Palma MD [facility] [place]        Today's Diagnoses     LRTI (lower respiratory tract infection)    -  1    Encounter for tobacco use cessation counseling           Follow-ups after your visit        Who to contact     If you have questions or need follow up information about today's clinic visit or your schedule please contact [facility] [place] directly at [phone].  Normal or non-critical lab and imaging results will be communicated to you by VDPhart, letter or phone within 4 business days after the clinic has received the results. If you do not hear from us within 7 days, please contact the clinic through VDPhart or phone. If you have a critical or abnormal lab result, we will notify you by phone as soon as possible.  Submit refill requests through Adpoints or call your pharmacy and they will forward the refill request to us. Please allow 3 business days for your refill to be completed.          Additional Information About Your Visit        MyChart Information     [employer] gives you secure access to your electronic health record. If you see a primary care provider, you can also send messages to your care team and make appointments. If you have questions, please call your primary care clinic.  If you do not have a primary care provider, please call [phone] and they will assist you.        Care EveryWhere ID     This is your Care EveryWhere ID. This could be used by other organizations to access your [place] medical records  [id number]        Your Vitals Were     Pulse Temperature Last Period Pulse Oximetry BMI (Body Mass Index)       104 98.7  F (37.1  C) (Tympanic) 01/03/2014 98% 24.37 kg/m2        Blood Pressure from Last 3 Encounters:    09/20/17 100/70   09/10/17 108/76   09/04/17 125/86    Weight from Last 3 Encounters:   09/20/17 142 lb (64.4 kg)   09/10/17 139 lb 15.9 oz (63.5 kg)   09/04/17 140 lb (63.5 kg)                 Today's Medication Changes          These changes are accurate as of: 9/20/17  1:12 PM.  If you have any questions, ask your nurse or doctor.               Start taking these medicines.        Dose/Directions    doxycycline 100 MG capsule   Commonly known as:  VIBRAMYCIN   Used for:  LRTI (lower respiratory tract infection)   Started by:  Jillian Palma MD        Dose:  100 mg   Take 1 capsule (100 mg) by mouth 2 times daily   Quantity:  14 capsule   Refills:  0       guaiFENesin-codeine 100-10 MG/5ML Soln solution   Commonly known as:  ROBITUSSIN AC   Used for:  LRTI (lower respiratory tract infection)   Started by:  Jillian Palma MD        Dose:  1-2 tsp.   Take 5-10 mLs by mouth every 4 hours as needed for cough   Quantity:  120 mL   Refills:  0       nicotine 10 MG Inhaler   Commonly known as:  NICOTROL   Used for:  Encounter for tobacco use cessation counseling   Started by:  Jillian Palma MD        Dose:  6-16 Cartridge   Inhale 6-16 Cartridges into the lungs daily as needed for smoking cessation   Quantity:  180 each   Refills:  3         Stop taking these medicines if you haven't already. Please contact your care team if you have questions.     oxyCODONE 5 MG IR tablet   Commonly known as:  ROXICODONE   Stopped by:  Jillian Palma MD           oxyCODONE-acetaminophen 5-325 MG per tablet   Commonly known as:  PERCOCET   Stopped by:  Jillian Palma MD                Where to get your medicines      These medications were sent to [facility] [place] - [address]  [address] [address]     Phone:  [phone]     doxycycline 100 MG capsule    nicotine 10 MG Inhaler         Some of these will need a paper  prescription and others can be bought over the counter.  Ask your nurse if you have questions.     Bring a paper prescription for each of these medications     guaiFENesin-codeine 100-10 MG/5ML Soln solution                Primary Care Provider Office Phone # Fax #    Jillian Palma [phone]       [address]  [place] [address]        Equal Access to Services     [facility] : Hadii aad ku hadasho Soomaali, waaxda luqadaha, qaybta kaalmada adeegyada, waxay idiin hayaan adeeg kharash la'aan ah. So [facility] [phone].    ATENCIÓN: Si habla español, tiene a smith disposición servicios gratuitos de asistencia lingüística. Llame al [phone].    We comply with applicable federal civil rights laws and [place] laws. We do not discriminate on the basis of race, color, national origin, age, disability sex, sexual orientation or gender identity.            Thank you!     Thank you for choosing [facility] [place]  for your care. Our goal is always to provide you with excellent care. Hearing back from our patients is one way we can continue to improve our services. Please take a few minutes to complete the written survey that you may receive in the mail after your visit with us. Thank you!             Your Updated Medication List - Protect others around you: Learn how to safely use, store and throw away your medicines at www.disposemymeds.org.          This list is accurate as of: 9/20/17  1:12 PM.  Always use your most recent med list.                   Brand Name Dispense Instructions for use Diagnosis    acetaminophen 500 MG tablet    TYLENOL    30 tablet    Take 2 tablets (1,000 mg) by mouth every 8 hours as needed for pain    Acute post-operative pain       ADDERALL PO      Take 5 mg by mouth daily        butalbital-acetaminophen-caffeine -40 MG per tablet    FIORICET/ESGIC     Take 1 tablet by mouth 3 times daily as needed for headaches        CLONAZEPAM PO       Take 1 mg by mouth At Bedtime        doxycycline 100 MG capsule    VIBRAMYCIN    14 capsule    Take 1 capsule (100 mg) by mouth 2 times daily    LRTI (lower respiratory tract infection)       gabapentin 300 MG capsule    NEURONTIN    90 capsule    Take 1 tablet (300 mg) every night for 1-3 days, then 1 tablet twice daily for 1-3 days, then 1 tablet three times daily        guaiFENesin-codeine 100-10 MG/5ML Soln solution    ROBITUSSIN AC    120 mL    Take 5-10 mLs by mouth every 4 hours as needed for cough    LRTI (lower respiratory tract infection)       hydrOXYzine 25 MG tablet    ATARAX    30 tablet    Take 1 tablet (25 mg) by mouth every 6 hours as needed for itching (and nausea and pain)    Acute post-operative pain       LAMICTAL PO      Take 100 mg by mouth 2 times daily        METHOCARBAMOL PO      Take 750 mg by mouth daily as needed for muscle spasms        naproxen 500 MG tablet    NAPROSYN    50 tablet    Take 1 tablet (500 mg) by mouth 2 times daily as needed for moderate pain    Acute post-operative pain       nicotine 10 MG Inhaler    NICOTROL    180 each    Inhale 6-16 Cartridges into the lungs daily as needed for smoking cessation    Encounter for tobacco use cessation counseling       norethindrone-ethinyl estradiol 1-35 MG-MCG per tablet    ORTHO-NOVUM 1-35 TAB,NORTREL 1-35 TAB     Take 1 tablet by mouth daily        promethazine 25 MG tablet    PHENERGAN     Take 25 mg by mouth daily as needed for nausea        SERTRALINE HCL PO      Take 100 mg by mouth At Bedtime

## 2017-09-21 ENCOUNTER — MYC MEDICAL ADVICE (OUTPATIENT)
Dept: FAMILY MEDICINE | Facility: CLINIC | Age: 32
End: 2017-09-21

## 2017-09-21 DIAGNOSIS — T36.95XA ANTIBIOTIC-INDUCED YEAST INFECTION: Primary | ICD-10-CM

## 2017-09-21 DIAGNOSIS — J22 LRTI (LOWER RESPIRATORY TRACT INFECTION): ICD-10-CM

## 2017-09-21 DIAGNOSIS — B37.9 ANTIBIOTIC-INDUCED YEAST INFECTION: Primary | ICD-10-CM

## 2017-09-21 RX ORDER — FLUCONAZOLE 150 MG/1
150 TABLET ORAL ONCE
Qty: 1 TABLET | Refills: 0 | Status: SHIPPED | OUTPATIENT
Start: 2017-09-21 | End: 2017-09-21

## 2017-09-21 RX ORDER — AZITHROMYCIN 250 MG/1
TABLET, FILM COATED ORAL
Qty: 6 TABLET | Refills: 0 | Status: SHIPPED | OUTPATIENT
Start: 2017-09-21 | End: 2018-01-26

## 2017-09-21 NOTE — TELEPHONE ENCOUNTER
Please see My Chart message below and advise as appropriate.  Diflucan pended for approval if appropriate  Daily Abad RN - Triage  [facility]

## 2018-01-20 ENCOUNTER — MYC MEDICAL ADVICE (OUTPATIENT)
Dept: FAMILY MEDICINE | Facility: CLINIC | Age: 33
End: 2018-01-20

## 2018-01-22 DIAGNOSIS — N80.9 ENDOMETRIOSIS: Primary | ICD-10-CM

## 2018-01-22 RX ORDER — NORETHINDRONE AND ETHINYL ESTRADIOL 1 MG-35MCG
KIT ORAL
Qty: 112 TABLET | Refills: 0 | Status: SHIPPED | OUTPATIENT
Start: 2018-01-22 | End: 2018-05-08

## 2018-01-22 NOTE — TELEPHONE ENCOUNTER
Refill ordered but need some clarification.  It looks like she's had a hysterectomy.  Is she taking this medication to control pain or for hormone replacement?    Jillian Palma MD

## 2018-01-22 NOTE — TELEPHONE ENCOUNTER
"Requested Prescriptions   Pending Prescriptions Disp Refills     NORTREL 1/35, 28, 1-35 MG-MCG per tablet [Pharmacy Med Name: NORTREL 1/35 TABLETS 28] 112 tablet 0     Sig: TAKE ONE TABLET BY MOUTH DAILY. TAKE CONTINOUSLY, NO PLACEBO.    Contraceptives Protocol Passed    1/22/2018  2:57 PM       Passed - Patient is not a current smoker if age is 35 or older       Passed - Recent or future visit with authorizing provider's specialty    Patient had office visit in the last year or has a visit in the next 30 days with authorizing provider.  See \"Patient Info\" tab in inbasket, or \"Choose Columns\" in Meds & Orders section of the refill encounter.            Passed - No active pregnancy on record       Passed - No positive pregnancy test in past 12 months        Last Written Prescription Date:  ??  Last Fill Quantity: ??,  # refills: ??   [place] [facility] [place] [facility] or [facility] prescribing provider:  9/20/2017   Future Office Visit:         Routing refill request to provider for review/approval because:  Medication is reported/historical      Yarelis Cortes, SARIAH, RN, PHN  [facility] [phone]            "

## 2018-01-23 ENCOUNTER — MYC MEDICAL ADVICE (OUTPATIENT)
Dept: FAMILY MEDICINE | Facility: CLINIC | Age: 33
End: 2018-01-23

## 2018-01-23 NOTE — TELEPHONE ENCOUNTER
Signifyd message sent to patient with below.   Inessa Moeller RN   [facility] - Triage

## 2018-01-23 NOTE — TELEPHONE ENCOUNTER
PCP please see message below regarding OCP that was filled:    Hi!  I thought it would be sent to my ObGyn. He wants me on it instead of HRT.   Thank you!    Inessa Moeller RN   [facility] - Triage

## 2018-01-26 ENCOUNTER — TELEPHONE (OUTPATIENT)
Dept: FAMILY MEDICINE | Facility: CLINIC | Age: 33
End: 2018-01-26

## 2018-01-26 ENCOUNTER — OFFICE VISIT (OUTPATIENT)
Dept: URGENT CARE | Facility: URGENT CARE | Age: 33
End: 2018-01-26
Payer: COMMERCIAL

## 2018-01-26 VITALS
DIASTOLIC BLOOD PRESSURE: 70 MMHG | SYSTOLIC BLOOD PRESSURE: 102 MMHG | WEIGHT: 150 LBS | BODY MASS INDEX: 25.75 KG/M2 | HEART RATE: 100 BPM | TEMPERATURE: 98.5 F | RESPIRATION RATE: 20 BRPM

## 2018-01-26 DIAGNOSIS — R51.9 NONINTRACTABLE HEADACHE, UNSPECIFIED CHRONICITY PATTERN, UNSPECIFIED HEADACHE TYPE: ICD-10-CM

## 2018-01-26 DIAGNOSIS — R11.2 NAUSEA AND VOMITING, INTRACTABILITY OF VOMITING NOT SPECIFIED, UNSPECIFIED VOMITING TYPE: ICD-10-CM

## 2018-01-26 DIAGNOSIS — G89.4 CHRONIC PAIN DISORDER: Primary | ICD-10-CM

## 2018-01-26 DIAGNOSIS — M79.7 FIBROMYALGIA: ICD-10-CM

## 2018-01-26 LAB
ALBUMIN SERPL-MCNC: 3.5 G/DL (ref 3.4–5)
ALBUMIN UR-MCNC: NEGATIVE MG/DL
ALP SERPL-CCNC: 61 U/L (ref 40–150)
ALT SERPL W P-5'-P-CCNC: 11 U/L (ref 0–50)
ANION GAP SERPL CALCULATED.3IONS-SCNC: 6 MMOL/L (ref 3–14)
APPEARANCE UR: CLEAR
AST SERPL W P-5'-P-CCNC: 13 U/L (ref 0–45)
BACTERIA #/AREA URNS HPF: ABNORMAL /HPF
BASOPHILS # BLD AUTO: 0 10E9/L (ref 0–0.2)
BASOPHILS NFR BLD AUTO: 0.3 %
BILIRUB SERPL-MCNC: 0.2 MG/DL (ref 0.2–1.3)
BILIRUB UR QL STRIP: NEGATIVE
BUN SERPL-MCNC: 5 MG/DL (ref 7–30)
CALCIUM SERPL-MCNC: 9 MG/DL (ref 8.5–10.1)
CHLORIDE SERPL-SCNC: 106 MMOL/L (ref 94–109)
CO2 SERPL-SCNC: 26 MMOL/L (ref 20–32)
COLOR UR AUTO: YELLOW
CREAT SERPL-MCNC: 0.8 MG/DL (ref 0.52–1.04)
DIFFERENTIAL METHOD BLD: NORMAL
EOSINOPHIL # BLD AUTO: 0.1 10E9/L (ref 0–0.7)
EOSINOPHIL NFR BLD AUTO: 1 %
ERYTHROCYTE [DISTWIDTH] IN BLOOD BY AUTOMATED COUNT: 13.7 % (ref 10–15)
GFR SERPL CREATININE-BSD FRML MDRD: 83 ML/MIN/1.7M2
GLUCOSE SERPL-MCNC: 85 MG/DL (ref 70–99)
GLUCOSE UR STRIP-MCNC: NEGATIVE MG/DL
GRAN CASTS #/AREA URNS LPF: ABNORMAL /LPF
HCT VFR BLD AUTO: 40.1 % (ref 35–47)
HGB BLD-MCNC: 13.4 G/DL (ref 11.7–15.7)
HGB UR QL STRIP: ABNORMAL
KETONES UR STRIP-MCNC: NEGATIVE MG/DL
LEUKOCYTE ESTERASE UR QL STRIP: NEGATIVE
LYMPHOCYTES # BLD AUTO: 3.9 10E9/L (ref 0.8–5.3)
LYMPHOCYTES NFR BLD AUTO: 39.6 %
MCH RBC QN AUTO: 30.2 PG (ref 26.5–33)
MCHC RBC AUTO-ENTMCNC: 33.4 G/DL (ref 31.5–36.5)
MCV RBC AUTO: 91 FL (ref 78–100)
MONOCYTES # BLD AUTO: 0.5 10E9/L (ref 0–1.3)
MONOCYTES NFR BLD AUTO: 5.2 %
NEUTROPHILS # BLD AUTO: 5.3 10E9/L (ref 1.6–8.3)
NEUTROPHILS NFR BLD AUTO: 53.9 %
NITRATE UR QL: NEGATIVE
PH UR STRIP: 6.5 PH (ref 5–7)
PLATELET # BLD AUTO: 341 10E9/L (ref 150–450)
POTASSIUM SERPL-SCNC: 4.3 MMOL/L (ref 3.4–5.3)
PROT SERPL-MCNC: 7.3 G/DL (ref 6.8–8.8)
RBC # BLD AUTO: 4.43 10E12/L (ref 3.8–5.2)
RBC #/AREA URNS AUTO: ABNORMAL /HPF
SODIUM SERPL-SCNC: 138 MMOL/L (ref 133–144)
SOURCE: ABNORMAL
SP GR UR STRIP: 1.01 (ref 1–1.03)
TSH SERPL DL<=0.005 MIU/L-ACNC: 2.39 MU/L (ref 0.4–4)
UROBILINOGEN UR STRIP-ACNC: 0.2 EU/DL (ref 0.2–1)
WBC # BLD AUTO: 9.8 10E9/L (ref 4–11)
WBC #/AREA URNS AUTO: ABNORMAL /HPF

## 2018-01-26 PROCEDURE — 99214 OFFICE O/P EST MOD 30 MIN: CPT | Performed by: PHYSICIAN ASSISTANT

## 2018-01-26 PROCEDURE — 36415 COLL VENOUS BLD VENIPUNCTURE: CPT | Performed by: PHYSICIAN ASSISTANT

## 2018-01-26 PROCEDURE — 81001 URINALYSIS AUTO W/SCOPE: CPT | Performed by: PHYSICIAN ASSISTANT

## 2018-01-26 PROCEDURE — 80050 GENERAL HEALTH PANEL: CPT | Performed by: PHYSICIAN ASSISTANT

## 2018-01-26 RX ORDER — PROMETHAZINE HYDROCHLORIDE 25 MG/1
25 TABLET ORAL EVERY 6 HOURS PRN
Qty: 15 TABLET | Refills: 0 | Status: ON HOLD | OUTPATIENT
Start: 2018-01-26 | End: 2020-04-19

## 2018-01-26 RX ORDER — BUTALBITAL, ACETAMINOPHEN AND CAFFEINE 50; 325; 40 MG/1; MG/1; MG/1
1 TABLET ORAL EVERY 6 HOURS PRN
Qty: 10 TABLET | Refills: 0 | Status: ON HOLD | OUTPATIENT
Start: 2018-01-26 | End: 2018-05-09

## 2018-01-26 NOTE — NURSING NOTE
"Chief Complaint   Patient presents with     Headache     sx for 3 wks,headaches,diarrhea and nausea     Diarrhea       Initial /70 (Cuff Size: Adult Regular)  Pulse 100  Temp 98.5  F (36.9  C) (Oral)  Resp 20  Wt 150 lb (68 kg)  LMP 01/03/2014  BMI 25.75 kg/m2 Estimated body mass index is 25.75 kg/(m^2) as calculated from the following:    Height as of 9/10/17: 5' 4\" (1.626 m).    Weight as of this encounter: 150 lb (68 kg).  Medication Reconciliation: complete Bonnie BERNSTEIN    "

## 2018-01-26 NOTE — TELEPHONE ENCOUNTER
"DONOVAN Dumont Luis is a 32 year old female who calls with nausea and headache since New Years day. Diarrhea at night. Gets up just once a night, but has had loose stool. Loose stools during the day a couple days a week.  States that she has a headache that is 12.5/10. States that she is out of Fioricet. States that there OB/GYN orders Fioricet but she is out of town.    NURSING ASSESSMENT:  Description: above  Onset/duration:  Since New Years  Precip. factors:  unknown  Associated symptoms:    Improves/worsens symptoms:  nothing  Pain scale (0-10)   10/10  LMP/preg/breast feeding:  hysterectomy  Last exam/Treatment:  9/20/17  Allergies:   Allergies   Allergen Reactions     Decadron [Dexamethasone] Other (See Comments)     Muscle cramps  No problem with prednisone     Depakote [Divalproex Sodium]      \"slows vision  - Weird reaction\"     Ibuprofen Nausea and Vomiting     Tramadol Hives and Other (See Comments)     Causes adverse reaction with other medications that pt takes     Valproic Acid Rash     (Divalproex)       MEDICATIONS:   Taking medication(s) as prescribed? Yes, except out of Fioricet, not using nicotine inhaler  Taking over the counter medication(s?) Yes and states that she is popping Excedrin like candy. Advised to follow 's directions  Any medication side effects? possible    Any barriers to taking medication(s) as prescribed?  Yes  Medication(s) improving/managing symptoms?  No  Medication reconciliation completed: Yes      NURSING PLAN: Nursing advice to patient to UC now. Patient states that she avoids the ER because they think that she is just seeking drugs    RECOMMENDED DISPOSITION:  To UC now. Patient requests appt at another clinic. Refused appt offered at [facility].  Will comply with recommendation: No- Barriers to comply with plan of care does not want to go to UC or ER.  If further questions/concerns or if symptoms do not improve, worsen or new symptoms develop, call " your PCP or [facility] as soon as possible.      Guideline used:  Telephone Triage Protocols for Nurses, Fourth Edition, Paula Hirsch RN

## 2018-01-26 NOTE — MR AVS SNAPSHOT
After Visit Summary   1/26/2018    Julia Smith    MRN: [id number]           Patient Information     Date Of Birth          1985        Visit Information        Provider Department      1/26/2018 1:50 PM Nikita Marquez PA-C [facility]        Today's Diagnoses     Chronic pain disorder    -  1    Fibromyalgia        Nausea and vomiting, intractability of vomiting not specified, unspecified vomiting type        Nonintractable headache, unspecified chronicity pattern, unspecified headache type           Follow-ups after your visit        Who to contact     If you have questions or need follow up information about today's clinic visit or your schedule please contact [facility] directly at [phone].  Normal or non-critical lab and imaging results will be communicated to you by Stripehart, letter or phone within 4 business days after the clinic has received the results. If you do not hear from us within 7 days, please contact the clinic through Stripehart or phone. If you have a critical or abnormal lab result, we will notify you by phone as soon as possible.  Submit refill requests through Vimty or call your pharmacy and they will forward the refill request to us. Please allow 3 business days for your refill to be completed.          Additional Information About Your Visit        MyChart Information     [employer] gives you secure access to your electronic health record. If you see a primary care provider, you can also send messages to your care team and make appointments. If you have questions, please call your primary care clinic.  If you do not have a primary care provider, please call [phone] and they will assist you.        Care EveryWhere ID     This is your Care EveryWhere ID. This could be used by other organizations to access your [facility] medical records  [id number]        Your Vitals Were     Pulse Temperature Respirations  Last Period BMI (Body Mass Index)       100 98.5  F (36.9  C) (Oral) 20 10/23/2014 (LMP Unknown) 25.75 kg/m2        Blood Pressure from Last 3 Encounters:   01/26/18 102/70   09/20/17 100/70   09/10/17 108/76    Weight from Last 3 Encounters:   01/26/18 150 lb (68 kg)   09/20/17 142 lb (64.4 kg)   09/10/17 139 lb 15.9 oz (63.5 kg)              We Performed the Following     CBC with platelets differential     Comprehensive metabolic panel     TSH with free T4 reflex     UA with Microscopic reflex to Culture          Today's Medication Changes          These changes are accurate as of 1/26/18 11:59 PM.  If you have any questions, ask your nurse or doctor.               These medicines have changed or have updated prescriptions.        Dose/Directions    * butalbital-acetaminophen-caffeine -40 MG per tablet   Commonly known as:  FIORICET/ESGIC   This may have changed:  Another medication with the same name was added. Make sure you understand how and when to take each.   Changed by:  Nikita Marquez PA-C        Dose:  1 tablet   Take 1 tablet by mouth 3 times daily as needed for headaches   Refills:  0       * butalbital-acetaminophen-caffeine -40 MG per tablet   Commonly known as:  FIORICET/ESGIC   This may have changed:  You were already taking a medication with the same name, and this prescription was added. Make sure you understand how and when to take each.   Used for:  Nonintractable headache, unspecified chronicity pattern, unspecified headache type   Changed by:  Nikita Marquez PA-C        Dose:  1 tablet   Take 1 tablet by mouth every 6 hours as needed   Quantity:  10 tablet   Refills:  0       promethazine 25 MG tablet   Commonly known as:  PHENERGAN   This may have changed:  when to take this   Used for:  Nausea and vomiting, intractability of vomiting not specified, unspecified vomiting type   Changed by:  Nikita Marquez PA-C        Dose:  25 mg   Take 1 tablet (25 mg) by mouth every 6  hours as needed for nausea   Quantity:  15 tablet   Refills:  0       * Notice:  This list has 2 medication(s) that are the same as other medications prescribed for you. Read the directions carefully, and ask your doctor or other care provider to review them with you.      Stop taking these medicines if you haven't already. Please contact your care team if you have questions.     azithromycin 250 MG tablet   Commonly known as:  ZITHROMAX   Stopped by:  Nikita Marquez PA-C           benzonatate 200 MG capsule   Commonly known as:  TESSALON   Stopped by:  Nikita Marquez PA-C           doxycycline 100 MG capsule   Commonly known as:  VIBRAMYCIN   Stopped by:  Nikita Marquez PA-C           guaiFENesin-codeine 100-10 MG/5ML Soln solution   Commonly known as:  ROBITUSSIN AC   Stopped by:  Nikita Marquez PA-C                Where to get your medicines      Some of these will need a paper prescription and others can be bought over the counter.  Ask your nurse if you have questions.     Bring a paper prescription for each of these medications     butalbital-acetaminophen-caffeine -40 MG per tablet    promethazine 25 MG tablet                Primary Care Provider Office Phone # Fax #    Jillian Palma [phone]       [address] [place] [address]        Equal Access to Services     CHICHI HUGHES AH: Hadii ty whalen hadasho Soomaali, waaxda luqadaha, qaybta kaalmada adeegyada, shimon bay hayparris thomas. So [facility] [phone].    ATENCIÓN: Si habla español, tiene a smith disposición servicios gratuitos de asistencia lingüística. Llame al [phone].    We comply with applicable federal civil rights laws and [place] laws. We do not discriminate on the basis of race, color, national origin, age, disability, sex, sexual orientation, or gender identity.            Thank you!     Thank you for choosing [facility]  for your care. Our goal  is always to provide you with excellent care. Hearing back from our patients is one way we can continue to improve our services. Please take a few minutes to complete the written survey that you may receive in the mail after your visit with us. Thank you!             Your Updated Medication List - Protect others around you: Learn how to safely use, store and throw away your medicines at www.disposemymeds.org.          This list is accurate as of 1/26/18 11:59 PM.  Always use your most recent med list.                   Brand Name Dispense Instructions for use Diagnosis    acetaminophen 500 MG tablet    TYLENOL    30 tablet    Take 2 tablets (1,000 mg) by mouth every 8 hours as needed for pain    Acute post-operative pain       ADDERALL PO      Take 5 mg by mouth daily        * butalbital-acetaminophen-caffeine -40 MG per tablet    FIORICET/ESGIC     Take 1 tablet by mouth 3 times daily as needed for headaches        * butalbital-acetaminophen-caffeine -40 MG per tablet    FIORICET/ESGIC    10 tablet    Take 1 tablet by mouth every 6 hours as needed    Nonintractable headache, unspecified chronicity pattern, unspecified headache type       CLONAZEPAM PO      Take 1 mg by mouth At Bedtime        gabapentin 300 MG capsule    NEURONTIN    90 capsule    Take 1 tablet (300 mg) every night for 1-3 days, then 1 tablet twice daily for 1-3 days, then 1 tablet three times daily        hydrOXYzine 25 MG tablet    ATARAX    30 tablet    Take 1 tablet (25 mg) by mouth every 6 hours as needed for itching (and nausea and pain)    Acute post-operative pain       LAMICTAL PO      Take 100 mg by mouth 2 times daily        METHOCARBAMOL PO      Take 750 mg by mouth daily as needed for muscle spasms        naproxen 500 MG tablet    NAPROSYN    50 tablet    Take 1 tablet (500 mg) by mouth 2 times daily as needed for moderate pain    Acute post-operative pain       nicotine 10 MG Inhaler    NICOTROL    180 each    Inhale 6-16  Cartridges into the lungs daily as needed for smoking cessation    Encounter for tobacco use cessation counseling       NORTREL 1/35 (28) 1-35 MG-MCG per tablet   Generic drug:  norethindrone-ethinyl estradiol     112 tablet    TAKE ONE TABLET BY MOUTH DAILY. TAKE CONTINOUSLY, NO PLACEBO.    Endometriosis       promethazine 25 MG tablet    PHENERGAN    15 tablet    Take 1 tablet (25 mg) by mouth every 6 hours as needed for nausea    Nausea and vomiting, intractability of vomiting not specified, unspecified vomiting type       SERTRALINE HCL PO      Take 100 mg by mouth At Bedtime        * Notice:  This list has 2 medication(s) that are the same as other medications prescribed for you. Read the directions carefully, and ask your doctor or other care provider to review them with you.

## 2018-01-30 NOTE — PROGRESS NOTES
"SUBJECTIVE  HPI:  Julia Smith is a 32 year old female who presents with the CC of having nausea and upset stomach, diarrhea.  This has been going on for a few weeks now.  She has had stomach issues in the past as well but seems to have been worse the last 3 wks.  Patient is also been having a headache.  She has taken meds for headaches in the past.     Past Medical History:   Diagnosis Date     Agoraphobia      Anxiety, generalized      Bipolar 2 disorder (H) 12/17/2013    eleno and inocente - navdeep[facility]     Endometriosis     confirmed by [facility]     Fibromyalgia      Gastro-oesophageal reflux disease      Migraine      OCD (obsessive compulsive disorder)      Other chronic pain     PELVIC     Panic attacks      PCOS (polycystic ovarian syndrome)      PTSD (post-traumatic stress disorder)      TMJ (dislocation of temporomandibular joint)      Allergies   Allergen Reactions     Decadron [Dexamethasone] Other (See Comments)     Muscle cramps  No problem with prednisone     Depakote [Divalproex Sodium]      \"slows vision  - Weird reaction\"     Ibuprofen Nausea and Vomiting     Tramadol Hives and Other (See Comments)     Causes adverse reaction with other medications that pt takes     Valproic Acid Rash     (Divalproex)     Social History   Substance Use Topics     Smoking status: Current Every Day Smoker     Packs/day: 0.50     Years: 5.00     Types: Cigarettes     Last attempt to quit: 7/30/2016     Smokeless tobacco: Never Used     Alcohol use 0.0 oz/week     0 Standard drinks or equivalent per week      Comment: rarely       ROS:  CONSTITUTIONAL:NEGATIVE for fever, chills, change in weight  INTEGUMENTARY/SKIN: NEGATIVE for worrisome rashes, moles or lesions  ENT/MOUTH: NEGATIVE for ear, mouth and throat problems  RESP:NEGATIVE for significant cough or SOB  CV: NEGATIVE for chest pain, palpitations or peripheral edema  GI: Positive for nausea, diarrhea  : Negative for dysuria  VASC: NEGATIVE for " cold extremities   MUSCULOSKELETAL: NEGATIVE for significant arthralgias or myalgia  NEURO: NEGATIVE for weakness, dizziness or paresthesias    OBJECTIVE:  /70 (Cuff Size: Adult Regular)  Pulse 100  Temp 98.5  F (36.9  C) (Oral)  Resp 20  Wt 150 lb (68 kg)  LMP 10/23/2014 (LMP Unknown)  BMI 25.75 kg/m2  GENERAL APPEARANCE: healthy, alert and no distress  EYES: EOMI,  PERRL, conjunctiva clear  HENT: ear canals and TM's normal.  Nose and mouth without ulcers, erythema or lesions  NECK: supple, nontender, no lymphadenopathy  RESP: lungs clear to auscultation - no rales, rhonchi or wheezes  CV: regular rates and rhythm, normal S1 S2, no murmur noted  ABDOMEN:  soft, nontender, no HSM or masses and bowel sounds normal  Extremities: no peripheral edema or tenderness, peripheral pulses normal  MS: extremities normal- no gross deformities noted, no erythema, FROM noted in all extremities  NEURO: Normal strength and tone, sensory exam grossly normal,  normal speech and mentation  SKIN: no suspicious lesions or rashes    Results for orders placed or performed in visit on 01/26/18   UA with Microscopic reflex to Culture   Result Value Ref Range    Color Urine Yellow     Appearance Urine Clear     Glucose Urine Negative NEG^Negative mg/dL    Bilirubin Urine Negative NEG^Negative    Ketones Urine Negative NEG^Negative mg/dL    Specific Gravity Urine 1.015 1.003 - 1.035    pH Urine 6.5 5.0 - 7.0 pH    Protein Albumin Urine Negative NEG^Negative mg/dL    Urobilinogen Urine 0.2 0.2 - 1.0 EU/dL    Nitrite Urine Negative NEG^Negative    Blood Urine Moderate (A) NEG^Negative    Leukocyte Esterase Urine Negative NEG^Negative    Source Midstream Urine     WBC Urine O - 2 OTO2^O - 2 /HPF    RBC Urine 5-10 (A) OTO2^O - 2 /HPF    Granular Casts 2-5 (A) NEG^Negative /LPF    Bacteria Urine Few (A) NEG^Negative /HPF   CBC with platelets differential   Result Value Ref Range    WBC 9.8 4.0 - 11.0 10e9/L    RBC Count 4.43 3.8 - 5.2  10e12/L    Hemoglobin 13.4 11.7 - 15.7 g/dL    Hematocrit 40.1 35.0 - 47.0 %    MCV 91 78 - 100 fl    MCH 30.2 26.5 - 33.0 pg    MCHC 33.4 31.5 - 36.5 g/dL    RDW 13.7 10.0 - 15.0 %    Platelet Count 341 150 - 450 10e9/L    Diff Method Automated Method     % Neutrophils 53.9 %    % Lymphocytes 39.6 %    % Monocytes 5.2 %    % Eosinophils 1.0 %    % Basophils 0.3 %    Absolute Neutrophil 5.3 1.6 - 8.3 10e9/L    Absolute Lymphocytes 3.9 0.8 - 5.3 10e9/L    Absolute Monocytes 0.5 0.0 - 1.3 10e9/L    Absolute Eosinophils 0.1 0.0 - 0.7 10e9/L    Absolute Basophils 0.0 0.0 - 0.2 10e9/L   Comprehensive metabolic panel   Result Value Ref Range    Sodium 138 133 - 144 mmol/L    Potassium 4.3 3.4 - 5.3 mmol/L    Chloride 106 94 - 109 mmol/L    Carbon Dioxide 26 20 - 32 mmol/L    Anion Gap 6 3 - 14 mmol/L    Glucose 85 70 - 99 mg/dL    Urea Nitrogen 5 (L) 7 - 30 mg/dL    Creatinine 0.80 0.52 - 1.04 mg/dL    GFR Estimate 83 >60 mL/min/1.7m2    GFR Estimate If Black >90 >60 mL/min/1.7m2    Calcium 9.0 8.5 - 10.1 mg/dL    Bilirubin Total 0.2 0.2 - 1.3 mg/dL    Albumin 3.5 3.4 - 5.0 g/dL    Protein Total 7.3 6.8 - 8.8 g/dL    Alkaline Phosphatase 61 40 - 150 U/L    ALT 11 0 - 50 U/L    AST 13 0 - 45 U/L   TSH with free T4 reflex   Result Value Ref Range    TSH 2.39 0.40 - 4.00 mU/L       ASSESSMENT/PLAN:    ICD-10-CM    1. Chronic pain disorder G89.4    2. Fibromyalgia M79.7    3. Nausea and vomiting, intractability of vomiting not specified, unspecified vomiting type R11.2 UA with Microscopic reflex to Culture     CBC with platelets differential     Comprehensive metabolic panel     TSH with free T4 reflex     promethazine (PHENERGAN) 25 MG tablet   4. Nonintractable headache, unspecified chronicity pattern, unspecified headache type R51 butalbital-acetaminophen-caffeine (FIORICET/ESGIC) -40 MG per tablet       Advised to follow up with PCP  Go to the ED if symptoms of headache fail to improve treatment    See Orders in  Epic

## 2018-03-18 ENCOUNTER — HOSPITAL ENCOUNTER (EMERGENCY)
Facility: CLINIC | Age: 33
Discharge: HOME | End: 2018-03-18
Attending: EMERGENCY MEDICINE | Admitting: EMERGENCY MEDICINE
Payer: COMMERCIAL

## 2018-03-18 VITALS
DIASTOLIC BLOOD PRESSURE: 83 MMHG | WEIGHT: 145 LBS | OXYGEN SATURATION: 98 % | HEART RATE: 87 BPM | RESPIRATION RATE: 18 BRPM | BODY MASS INDEX: 24.75 KG/M2 | TEMPERATURE: 97.8 F | SYSTOLIC BLOOD PRESSURE: 115 MMHG | HEIGHT: 64 IN

## 2018-03-18 DIAGNOSIS — S39.011A STRAIN OF ABDOMINAL WALL, INITIAL ENCOUNTER: ICD-10-CM

## 2018-03-18 PROCEDURE — 25000132 ZZH RX MED GY IP 250 OP 250 PS 637: Performed by: EMERGENCY MEDICINE

## 2018-03-18 PROCEDURE — 99283 EMERGENCY DEPT VISIT LOW MDM: CPT

## 2018-03-18 RX ORDER — OXYCODONE AND ACETAMINOPHEN 5; 325 MG/1; MG/1
1 TABLET ORAL ONCE
Status: COMPLETED | OUTPATIENT
Start: 2018-03-18 | End: 2018-03-18

## 2018-03-18 RX ADMIN — OXYCODONE HYDROCHLORIDE AND ACETAMINOPHEN 1 TABLET: 5; 325 TABLET ORAL at 21:10

## 2018-03-18 NOTE — ED AVS SNAPSHOT
Emergency Department    [address] [facility] [address]    Phone:  [phone]    Fax:  [phone]                                       Julia Smith   MRN: [id number]    Department:   Emergency Department   Date of Visit:  3/18/2018           Patient Information     Date Of Birth          1985        Your diagnoses for this visit were:     Strain of abdominal wall, initial encounter        You were seen by Clara Sequeira MD.      Follow-up Information     Follow up with Jillian Palma MD.    Specialty:  Internal Medicine    Why:  As needed    Contact information:    [address] [place] [address]  [phone]          Discharge Instructions         Muscle Strain in the Abdomen  A muscle strain is a stretching or tearing of the muscle fibers. It is also called a pulled muscle. The abdomen is protected by a thick wall of muscle in the front and sides. These muscles help with twisting and bending forward. Too much coughing, lifting heavy objects, or sudden jerking movements can sometimes cause a muscle strain in the abdomen. This causes pain that is worse when you move. The area may also feel tender or look swollen and bruised.  Home care    Apply an ice pack over the injured area for 15 to 20 minutes every 3 to 6 hours. You should do this for the first 24 to 48 hours. You can make an ice pack by filling a plastic bag that seals at the top with ice cubes and then wrapping it with a thin towel. Be careful not to injure your skin with the ice treatments. Ice should never be applied directly to skin. Continue the use of ice packs for relief of pain and swelling as needed. After 48 hours, apply heat (warm shower or warm bath) for 15 to 20 minutes several times a day, or alternate ice and heat.    You may use over-the-counter pain medicine to control pain, unless another pain medicine was prescribed. If you have liver or kidney disease, a stomach ulcer or GI  bleeding, talk with your healthcare provider before using these medicines.  Follow-up care  Follow up with your healthcare provider, or as advised.  Call 911  Call 911 if you have:    Weakness, lightheaded, or faint    Chest pain  When to seek medical advice  Call your healthcare provider right away if any of these occur:    Pain gets worse or moves to the right lower abdomen, just below the waistline    Fever of 100.4 F (38 C) or above lasting for 24 to 48 hours    Vomiting    Severe abdominal pain that spreads to the back or toward the groin    Blood in the urine    Unexpected vaginal bleeding in women  Date Last Reviewed: 11/19/2015 2000-2017 [employer]. [address] [place] [address], [place] [address]. All rights reserved. This information is not intended as a substitute for professional medical care. Always follow your healthcare professional's instructions.          24 Hour Appointment Hotline       To make an appointment at any [facility], call [phone] ([phone]). If you don't have a family doctor or clinic, we will help you find one. [facility] are conveniently located to serve the needs of you and your family.             Review of your medicines      Our records show that you are taking the medicines listed below. If these are incorrect, please call your family doctor or clinic.        Dose / Directions Last dose taken    acetaminophen 500 MG tablet   Commonly known as:  TYLENOL   Dose:  1000 mg   Quantity:  30 tablet        Take 2 tablets (1,000 mg) by mouth every 8 hours as needed for pain   Refills:  0        ADDERALL PO   Dose:  5 mg        Take 5 mg by mouth daily   Refills:  0        * butalbital-acetaminophen-caffeine -40 MG per tablet   Commonly known as:  FIORICET/ESGIC   Dose:  1 tablet        Take 1 tablet by mouth 3 times daily as needed for headaches   Refills:  0        * butalbital-acetaminophen-caffeine -40 MG per tablet   Commonly known as:   FIORICET/ESGIC   Dose:  1 tablet   Quantity:  10 tablet        Take 1 tablet by mouth every 6 hours as needed   Refills:  0        CLONAZEPAM PO   Dose:  1 mg        Take 1 mg by mouth At Bedtime   Refills:  0        gabapentin 300 MG capsule   Commonly known as:  NEURONTIN   Quantity:  90 capsule        Take 1 tablet (300 mg) every night for 1-3 days, then 1 tablet twice daily for 1-3 days, then 1 tablet three times daily   Refills:  0        hydrOXYzine 25 MG tablet   Commonly known as:  ATARAX   Dose:  25 mg   Quantity:  30 tablet        Take 1 tablet (25 mg) by mouth every 6 hours as needed for itching (and nausea and pain)   Refills:  0        LAMICTAL PO   Dose:  100 mg        Take 100 mg by mouth 2 times daily   Refills:  0        METHOCARBAMOL PO   Dose:  750 mg        Take 750 mg by mouth daily as needed for muscle spasms   Refills:  0        naproxen 500 MG tablet   Commonly known as:  NAPROSYN   Dose:  500 mg   Quantity:  50 tablet        Take 1 tablet (500 mg) by mouth 2 times daily as needed for moderate pain   Refills:  1        nicotine 10 MG Inhaler   Commonly known as:  NICOTROL   Dose:  6-16 Cartridge   Quantity:  180 each        Inhale 6-16 Cartridges into the lungs daily as needed for smoking cessation   Refills:  3        NORTREL 1/35 (28) 1-35 MG-MCG per tablet   Quantity:  112 tablet   Generic drug:  norethindrone-ethinyl estradiol        TAKE ONE TABLET BY MOUTH DAILY. TAKE CONTINOUSLY, NO PLACEBO.   Refills:  0        promethazine 25 MG tablet   Commonly known as:  PHENERGAN   Dose:  25 mg   Quantity:  15 tablet        Take 1 tablet (25 mg) by mouth every 6 hours as needed for nausea   Refills:  0        SERTRALINE HCL PO   Dose:  100 mg        Take 100 mg by mouth At Bedtime   Refills:  0        * Notice:  This list has 2 medication(s) that are the same as other medications prescribed for you. Read the directions carefully, and ask your doctor or other care provider to review them with you.             Orders Needing Specimen Collection     None      Pending Results     No orders found from 3/16/2018 to 3/19/2018.            Pending Culture Results     No orders found from 3/16/2018 to 3/19/2018.            Pending Results Instructions     If you had any lab results that were not finalized at the time of your Discharge, you can call the ED Lab Result RN at [phone]. You will be contacted by this team for any positive Lab results or changes in treatment. The nurses are available 7 days a week from 10A to 6:30P.  You can leave a message 24 hours per day and they will return your call.        Test Results From Your Hospital Stay               Clinical Quality Measure: Blood Pressure Screening     Your blood pressure was checked while you were in the emergency department today. The last reading we obtained was  BP: 141/82 . Please read the guidelines below about what these numbers mean and what you should do about them.  If your systolic blood pressure (the top number) is less than 120 and your diastolic blood pressure (the bottom number) is less than 80, then your blood pressure is normal. There is nothing more that you need to do about it.  If your systolic blood pressure (the top number) is 120-139 or your diastolic blood pressure (the bottom number) is 80-89, your blood pressure may be higher than it should be. You should have your blood pressure rechecked within a year by a primary care provider.  If your systolic blood pressure (the top number) is 140 or greater or your diastolic blood pressure (the bottom number) is 90 or greater, you may have high blood pressure. High blood pressure is treatable, but if left untreated over time it can put you at risk for heart attack, stroke, or kidney failure. You should have your blood pressure rechecked by a primary care provider within the next 4 weeks.  If your provider in the emergency department today gave you specific instructions to follow-up with your  doctor or provider even sooner than that, you should follow that instruction and not wait for up to 4 weeks for your follow-up visit.        Thank you for choosing [facility]       Thank you for choosing [facility] for your care. Our goal is always to provide you with excellent care. Hearing back from our patients is one way we can continue to improve our services. Please take a few minutes to complete the written survey that you may receive in the mail after you visit with us. Thank you!        [email or web address] Information     [employer] gives you secure access to your electronic health record. If you see a primary care provider, you can also send messages to your care team and make appointments. If you have questions, please call your primary care clinic.  If you do not have a primary care provider, please call [phone] and they will assist you.        Care EveryWhere ID     This is your Care EveryWhere ID. This could be used by other organizations to access your [facility] medical records  [id number]        Equal Access to Services     CHICHI HUGHES : Marsha Perrin, john sanders, adam lynch, shimon black . So [facility] [phone].    ATENCIÓN: Si habla español, tiene a smith disposición servicios gratuitos de asistencia lingüística. Llame al [phone].    We comply with applicable federal civil rights laws and [place] laws. We do not discriminate on the basis of race, color, national origin, age, disability, sex, sexual orientation, or gender identity.            After Visit Summary       This is your record. Keep this with you and show to your community pharmacist(s) and doctor(s) at your next visit.

## 2018-03-18 NOTE — ED AVS SNAPSHOT
Emergency Department    [address] [facility] [address]    Phone:  [phone]    Fax:  [phone]                                       Julia Smith   MRN: [id number]    Department:   Emergency Department   Date of Visit:  3/18/2018           After Visit Summary Signature Page     I have received my discharge instructions, and my questions have been answered. I have discussed any challenges I see with this plan with the nurse or doctor.    ..........................................................................................................................................  Patient/Patient Representative Signature      ..........................................................................................................................................  Patient Representative Print Name and Relationship to Patient    ..................................................               ................................................  Date                                            Time    ..........................................................................................................................................  Reviewed by Signature/Title    ...................................................              ..............................................  Date                                                            Time

## 2018-03-19 NOTE — DISCHARGE INSTRUCTIONS
Muscle Strain in the Abdomen  A muscle strain is a stretching or tearing of the muscle fibers. It is also called a pulled muscle. The abdomen is protected by a thick wall of muscle in the front and sides. These muscles help with twisting and bending forward. Too much coughing, lifting heavy objects, or sudden jerking movements can sometimes cause a muscle strain in the abdomen. This causes pain that is worse when you move. The area may also feel tender or look swollen and bruised.  Home care    Apply an ice pack over the injured area for 15 to 20 minutes every 3 to 6 hours. You should do this for the first 24 to 48 hours. You can make an ice pack by filling a plastic bag that seals at the top with ice cubes and then wrapping it with a thin towel. Be careful not to injure your skin with the ice treatments. Ice should never be applied directly to skin. Continue the use of ice packs for relief of pain and swelling as needed. After 48 hours, apply heat (warm shower or warm bath) for 15 to 20 minutes several times a day, or alternate ice and heat.    You may use over-the-counter pain medicine to control pain, unless another pain medicine was prescribed. If you have liver or kidney disease, a stomach ulcer or GI bleeding, talk with your healthcare provider before using these medicines.  Follow-up care  Follow up with your healthcare provider, or as advised.  Call 911  Call 911 if you have:    Weakness, lightheaded, or faint    Chest pain  When to seek medical advice  Call your healthcare provider right away if any of these occur:    Pain gets worse or moves to the right lower abdomen, just below the waistline    Fever of 100.4 F (38 C) or above lasting for 24 to 48 hours    Vomiting    Severe abdominal pain that spreads to the back or toward the groin    Blood in the urine    Unexpected vaginal bleeding in women  Date Last Reviewed: 11/19/2015 2000-2017 [employer] | [employer]. [address] [facility], [place]  [address]. All rights reserved. This information is not intended as a substitute for professional medical care. Always follow your healthcare professional's instructions.

## 2018-03-19 NOTE — ED PROVIDER NOTES
History     Chief Complaint:  Pelvic pain    HPI   Julia Smith is a 33 year old female with a medical history of endometriosis, fibromyalgia, and chronic pain on a care plan  who presents with pelvic pain. The patient reports that she was trying to  one of her son's  Lego  toys and stood up to put it away, but felt something tear on the right side of her abdomen/pelvis.  at 7 PM this evening, 1.5 hours ago. Patient took 2 tablets of Tylenol and Aleve. Patient is experiencing a lot of pain and is tearful. History of Salpingo-oophorectomy and hysterectomy.    Allergies:  Decadron  Depakote  Ibuprofen  Tramadol  Valproic acid     Medications:    Fiorcet/Esgic  Phenergan  Nortrel  Nicotrol  Neurontin  Naprosyn  Clonazepam  Methocarbamol  Sertraline HCl  Lamictal  Atarax  Adderall    Past Medical History:    Agoraphobia  Anxiety  Bipolar 2 disorder  Endometriosis  Fibromyalgia  Gastro-oesophageal reflux disease  Migraine  Obsessive compulsive disorder  Chronic pain  Panic attacks  Polycystic ovarian syndrome  Post-traumatic stress disorder  Dislocation of temporomandibular joint  Tobacco abuse    Past Surgical History:    DaVinci assisted ablation/excision of endometriosis  DaVinci Hysterectomy total   DaVinci oophorectomy  DaVinci pelvic procedure x3  DaVinci Salpingectomy, Salpingo-oophorectomy  Dilation and curettage x2  Laparoscopic appendectomy    Family History:    Depression    Social History:  Smoking status: Current every day smoker, last attempt to quit 2016  Alcohol use: No   Marital Status:   [2]  Lives at home with  and son.     Review of Systems   Genitourinary: Positive for pelvic pain.   All other systems reviewed and are negative.    Physical Exam   Patient Vitals for the past 24 hrs:   BP Temp Temp src Pulse Resp SpO2 Height Weight   03/18/18 2031 - - - 60 - 98 % - -   03/18/18 2028 - - - 94 - 98 % - -   03/18/18 2011 141/82 97.8  F (36.6  C) Temporal 110 18 96 % 1.626 m (5'  "4\") 65.8 kg (145 lb)      Physical Exam  General: Resting comfortably on the gurney. Tearful.  Head:  The scalp, face, and head appear normal  Neck:  Normal range of motion  Abdomen:  Inconsistent tenderness in the lower abdomen with multiple surgical scars.   MS:  Tenderness over the right oblique muscle. Which seems to be the main source of discomfort.   Neuro:  Speech is normal and fluent. Moves all 4 extremities.   Psych:  Awake. Alert.  Normal affect.      Appropriate interactions    Emergency Department Course   Emergency Department Course:  Past medical records, nursing notes, and vitals reviewed.  2045: I performed an exam of the patient and obtained history, as documented above.      I discussed the patient about her care plan, which is to follow the pain pill policy. She asked to speak to a patient advocate. The charge nurse has spoken with her and reiterated the care plan.     Patient discharged home with instructions regarding supportive care, medications, and reasons to return. The importance of close follow-up was reviewed.      Impression & Plan    Medical Decision Making:  The patient is a 33-year-old female with history of chronic abdominal pain and on a care plan who comes in today with right-sided abdominal pain after picking up her son's toy from the floor.  She reports she is picking up some Legos.  She has  tenderness along the right oblique.  Here she is quite dramatic and tearful as she often is on presentation here when she has pain.  I have told her I do not think there is any significant intra-abdominal pathology as she has had her uterus and ovaries removed.  She is concerned about adhesions and  was told if there was a tear of an adhesion it would not change her management.  I have discussed her primary care plan which is to follow the chronic pain policy which is that she may have 1 pain pill here but none to go home with.  She is asked to speak to her patient advocate as she would does " not agree with the care plan.  The charge nurse has spoken with her.     Assessment abdominal wall strain #2 chronic pain on a care plan    disposition: Home     Discharge instructions she can follow-up with her primary as needed.  Tylenol or ibuprofen as needed.    Diagnosis:    ICD-10-CM   1. Strain of abdominal wall, initial encounter S39.011A     Disposition:  discharged to home    Janiya   3/18/2018    EMERGENCY DEPARTMENT  I, Janiya , am serving as a scribe at 8:45 PM on 3/18/2018 to document services personally performed by Clara Sequeira MD based on my observations and the provider's statements to me.       Clara Sequeira MD  03/19/18 2008

## 2018-04-30 ENCOUNTER — TRANSFERRED RECORDS (OUTPATIENT)
Dept: HEALTH INFORMATION MANAGEMENT | Facility: CLINIC | Age: 33
End: 2018-04-30

## 2018-05-04 RX ORDER — DEXTROAMPHETAMINE SACCHARATE, AMPHETAMINE ASPARTATE MONOHYDRATE, DEXTROAMPHETAMINE SULFATE AND AMPHETAMINE SULFATE 1.25; 1.25; 1.25; 1.25 MG/1; MG/1; MG/1; MG/1
5 CAPSULE, EXTENDED RELEASE ORAL DAILY
COMMUNITY
End: 2018-05-08

## 2018-05-08 ENCOUNTER — ANESTHESIA EVENT (OUTPATIENT)
Dept: SURGERY | Facility: CLINIC | Age: 33
End: 2018-05-08
Payer: COMMERCIAL

## 2018-05-08 RX ORDER — POLYETHYLENE GLYCOL 3350 17 G/17G
17 POWDER, FOR SOLUTION ORAL DAILY PRN
COMMUNITY

## 2018-05-09 ENCOUNTER — SURGERY (OUTPATIENT)
Age: 33
End: 2018-05-09
Payer: COMMERCIAL

## 2018-05-09 ENCOUNTER — ANESTHESIA (OUTPATIENT)
Dept: SURGERY | Facility: CLINIC | Age: 33
End: 2018-05-09
Payer: COMMERCIAL

## 2018-05-09 ENCOUNTER — HOSPITAL ENCOUNTER (OUTPATIENT)
Facility: CLINIC | Age: 33
Discharge: HOME | End: 2018-05-10
Attending: UROLOGY | Admitting: OBSTETRICS & GYNECOLOGY
Payer: COMMERCIAL

## 2018-05-09 DIAGNOSIS — G89.18 ACUTE POST-OPERATIVE PAIN: Primary | ICD-10-CM

## 2018-05-09 LAB — HGB BLD-MCNC: 12.6 G/DL (ref 11.7–15.7)

## 2018-05-09 PROCEDURE — 71000012 ZZH RECOVERY PHASE 1 LEVEL 1 FIRST HR: Performed by: OBSTETRICS & GYNECOLOGY

## 2018-05-09 PROCEDURE — 40000935 ZZH STATISTIC OUTPATIENT (NON-OBS) EVE

## 2018-05-09 PROCEDURE — 40000934 ZZH STATISTIC OUTPATIENT (NON-OBS) DAY

## 2018-05-09 PROCEDURE — 25000132 ZZH RX MED GY IP 250 OP 250 PS 637: Performed by: OBSTETRICS & GYNECOLOGY

## 2018-05-09 PROCEDURE — 25000128 H RX IP 250 OP 636: Performed by: ANESTHESIOLOGY

## 2018-05-09 PROCEDURE — 25000128 H RX IP 250 OP 636: Performed by: UROLOGY

## 2018-05-09 PROCEDURE — C1769 GUIDE WIRE: HCPCS | Performed by: OBSTETRICS & GYNECOLOGY

## 2018-05-09 PROCEDURE — C1765 ADHESION BARRIER: HCPCS | Performed by: OBSTETRICS & GYNECOLOGY

## 2018-05-09 PROCEDURE — 40000936 ZZH STATISTIC OUTPATIENT (NON-OBS) NIGHT

## 2018-05-09 PROCEDURE — 25000128 H RX IP 250 OP 636

## 2018-05-09 PROCEDURE — 25000125 ZZHC RX 250

## 2018-05-09 PROCEDURE — 88305 TISSUE EXAM BY PATHOLOGIST: CPT | Performed by: OBSTETRICS & GYNECOLOGY

## 2018-05-09 PROCEDURE — 85018 HEMOGLOBIN: CPT | Performed by: UROLOGY

## 2018-05-09 PROCEDURE — 25800025 ZZH RX 258: Performed by: OBSTETRICS & GYNECOLOGY

## 2018-05-09 PROCEDURE — 40000170 ZZH STATISTIC PRE-PROCEDURE ASSESSMENT II: Performed by: OBSTETRICS & GYNECOLOGY

## 2018-05-09 PROCEDURE — 27210995 ZZH RX 272: Performed by: OBSTETRICS & GYNECOLOGY

## 2018-05-09 PROCEDURE — 36000085 ZZH SURGERY LEVEL 8 1ST 30 MIN: Performed by: OBSTETRICS & GYNECOLOGY

## 2018-05-09 PROCEDURE — 37000009 ZZH ANESTHESIA TECHNICAL FEE, EACH ADDTL 15 MIN: Performed by: OBSTETRICS & GYNECOLOGY

## 2018-05-09 PROCEDURE — 88304 TISSUE EXAM BY PATHOLOGIST: CPT | Mod: 26 | Performed by: OBSTETRICS & GYNECOLOGY

## 2018-05-09 PROCEDURE — 25000128 H RX IP 250 OP 636: Performed by: OBSTETRICS & GYNECOLOGY

## 2018-05-09 PROCEDURE — 25000566 ZZH SEVOFLURANE, EA 15 MIN: Performed by: OBSTETRICS & GYNECOLOGY

## 2018-05-09 PROCEDURE — 27210794 ZZH OR GENERAL SUPPLY STERILE: Performed by: OBSTETRICS & GYNECOLOGY

## 2018-05-09 PROCEDURE — 36000087 ZZH SURGERY LEVEL 8 EA 15 ADDTL MIN: Performed by: OBSTETRICS & GYNECOLOGY

## 2018-05-09 PROCEDURE — 25000125 ZZHC RX 250: Performed by: OBSTETRICS & GYNECOLOGY

## 2018-05-09 PROCEDURE — 25000132 ZZH RX MED GY IP 250 OP 250 PS 637: Performed by: ANESTHESIOLOGY

## 2018-05-09 PROCEDURE — 37000008 ZZH ANESTHESIA TECHNICAL FEE, 1ST 30 MIN: Performed by: OBSTETRICS & GYNECOLOGY

## 2018-05-09 PROCEDURE — 36415 COLL VENOUS BLD VENIPUNCTURE: CPT | Performed by: UROLOGY

## 2018-05-09 PROCEDURE — 88304 TISSUE EXAM BY PATHOLOGIST: CPT | Performed by: OBSTETRICS & GYNECOLOGY

## 2018-05-09 PROCEDURE — 27210995 ZZH RX 272: Performed by: UROLOGY

## 2018-05-09 PROCEDURE — 88305 TISSUE EXAM BY PATHOLOGIST: CPT | Mod: 26 | Performed by: OBSTETRICS & GYNECOLOGY

## 2018-05-09 RX ORDER — MEPERIDINE HYDROCHLORIDE 25 MG/ML
12.5 INJECTION INTRAMUSCULAR; INTRAVENOUS; SUBCUTANEOUS
Status: DISCONTINUED | OUTPATIENT
Start: 2018-05-09 | End: 2018-05-09 | Stop reason: HOSPADM

## 2018-05-09 RX ORDER — ACETAMINOPHEN 325 MG/1
650 TABLET ORAL EVERY 6 HOURS PRN
Status: DISCONTINUED | OUTPATIENT
Start: 2018-05-09 | End: 2018-05-10 | Stop reason: HOSPADM

## 2018-05-09 RX ORDER — ACETAMINOPHEN 500 MG
1000 TABLET ORAL ONCE
Status: COMPLETED | OUTPATIENT
Start: 2018-05-09 | End: 2018-05-09

## 2018-05-09 RX ORDER — HYDROXYZINE HYDROCHLORIDE 25 MG/1
25 TABLET, FILM COATED ORAL ONCE
Status: COMPLETED | OUTPATIENT
Start: 2018-05-09 | End: 2018-05-09

## 2018-05-09 RX ORDER — SODIUM CHLORIDE, SODIUM LACTATE, POTASSIUM CHLORIDE, CALCIUM CHLORIDE 600; 310; 30; 20 MG/100ML; MG/100ML; MG/100ML; MG/100ML
INJECTION, SOLUTION INTRAVENOUS CONTINUOUS
Status: DISCONTINUED | OUTPATIENT
Start: 2018-05-09 | End: 2018-05-09 | Stop reason: HOSPADM

## 2018-05-09 RX ORDER — OXYCODONE HYDROCHLORIDE 5 MG/1
5-10 TABLET ORAL
Qty: 40 TABLET | Refills: 0 | Status: SHIPPED | OUTPATIENT
Start: 2018-05-09 | End: 2020-03-08

## 2018-05-09 RX ORDER — CEFAZOLIN SODIUM 2 G/100ML
2 INJECTION, SOLUTION INTRAVENOUS
Status: COMPLETED | OUTPATIENT
Start: 2018-05-09 | End: 2018-05-09

## 2018-05-09 RX ORDER — DIPHENHYDRAMINE HYDROCHLORIDE 50 MG/ML
INJECTION INTRAMUSCULAR; INTRAVENOUS PRN
Status: DISCONTINUED | OUTPATIENT
Start: 2018-05-09 | End: 2018-05-09

## 2018-05-09 RX ORDER — CEFAZOLIN SODIUM 1 G/3ML
1 INJECTION, POWDER, FOR SOLUTION INTRAMUSCULAR; INTRAVENOUS SEE ADMIN INSTRUCTIONS
Status: DISCONTINUED | OUTPATIENT
Start: 2018-05-09 | End: 2018-05-09 | Stop reason: HOSPADM

## 2018-05-09 RX ORDER — FENTANYL CITRATE 50 UG/ML
25-50 INJECTION, SOLUTION INTRAMUSCULAR; INTRAVENOUS
Status: DISCONTINUED | OUTPATIENT
Start: 2018-05-09 | End: 2018-05-09 | Stop reason: HOSPADM

## 2018-05-09 RX ORDER — LIDOCAINE 40 MG/G
CREAM TOPICAL
Status: DISCONTINUED | OUTPATIENT
Start: 2018-05-09 | End: 2018-05-10 | Stop reason: HOSPADM

## 2018-05-09 RX ORDER — LIDOCAINE HYDROCHLORIDE 20 MG/ML
INJECTION, SOLUTION INFILTRATION; PERINEURAL PRN
Status: DISCONTINUED | OUTPATIENT
Start: 2018-05-09 | End: 2018-05-09

## 2018-05-09 RX ORDER — ONDANSETRON 4 MG/1
4 TABLET, ORALLY DISINTEGRATING ORAL EVERY 30 MIN PRN
Status: DISCONTINUED | OUTPATIENT
Start: 2018-05-09 | End: 2018-05-09 | Stop reason: HOSPADM

## 2018-05-09 RX ORDER — NEOSTIGMINE METHYLSULFATE 1 MG/ML
VIAL (ML) INJECTION PRN
Status: DISCONTINUED | OUTPATIENT
Start: 2018-05-09 | End: 2018-05-09

## 2018-05-09 RX ORDER — METOCLOPRAMIDE 10 MG/1
10 TABLET ORAL EVERY 6 HOURS PRN
Status: DISCONTINUED | OUTPATIENT
Start: 2018-05-09 | End: 2018-05-10 | Stop reason: HOSPADM

## 2018-05-09 RX ORDER — PROPOFOL 10 MG/ML
INJECTION, EMULSION INTRAVENOUS CONTINUOUS PRN
Status: DISCONTINUED | OUTPATIENT
Start: 2018-05-09 | End: 2018-05-09

## 2018-05-09 RX ORDER — HYDROMORPHONE HYDROCHLORIDE 1 MG/ML
.3-.5 INJECTION, SOLUTION INTRAMUSCULAR; INTRAVENOUS; SUBCUTANEOUS EVERY 10 MIN PRN
Status: DISCONTINUED | OUTPATIENT
Start: 2018-05-09 | End: 2018-05-09 | Stop reason: HOSPADM

## 2018-05-09 RX ORDER — METOCLOPRAMIDE HYDROCHLORIDE 5 MG/ML
10 INJECTION INTRAMUSCULAR; INTRAVENOUS EVERY 6 HOURS PRN
Status: DISCONTINUED | OUTPATIENT
Start: 2018-05-09 | End: 2018-05-10 | Stop reason: HOSPADM

## 2018-05-09 RX ORDER — HYDROXYZINE HYDROCHLORIDE 25 MG/1
25 TABLET, FILM COATED ORAL EVERY 6 HOURS PRN
Status: DISCONTINUED | OUTPATIENT
Start: 2018-05-09 | End: 2018-05-10 | Stop reason: HOSPADM

## 2018-05-09 RX ORDER — SODIUM CHLORIDE, SODIUM LACTATE, POTASSIUM CHLORIDE, CALCIUM CHLORIDE 600; 310; 30; 20 MG/100ML; MG/100ML; MG/100ML; MG/100ML
INJECTION, SOLUTION INTRAVENOUS CONTINUOUS
Status: DISCONTINUED | OUTPATIENT
Start: 2018-05-09 | End: 2018-05-10 | Stop reason: HOSPADM

## 2018-05-09 RX ORDER — BUPIVACAINE HYDROCHLORIDE AND EPINEPHRINE 2.5; 5 MG/ML; UG/ML
INJECTION, SOLUTION INFILTRATION; PERINEURAL PRN
Status: DISCONTINUED | OUTPATIENT
Start: 2018-05-09 | End: 2018-05-09 | Stop reason: HOSPADM

## 2018-05-09 RX ORDER — MAGNESIUM HYDROXIDE 1200 MG/15ML
LIQUID ORAL PRN
Status: DISCONTINUED | OUTPATIENT
Start: 2018-05-09 | End: 2018-05-09 | Stop reason: HOSPADM

## 2018-05-09 RX ORDER — OXYCODONE HYDROCHLORIDE 5 MG/1
5-10 TABLET ORAL
Status: DISCONTINUED | OUTPATIENT
Start: 2018-05-09 | End: 2018-05-10 | Stop reason: HOSPADM

## 2018-05-09 RX ORDER — FENTANYL CITRATE 50 UG/ML
25-100 INJECTION, SOLUTION INTRAMUSCULAR; INTRAVENOUS
Status: DISCONTINUED | OUTPATIENT
Start: 2018-05-09 | End: 2018-05-09 | Stop reason: HOSPADM

## 2018-05-09 RX ORDER — DIAZEPAM 10 MG/2ML
2.5 INJECTION, SOLUTION INTRAMUSCULAR; INTRAVENOUS
Status: DISCONTINUED | OUTPATIENT
Start: 2018-05-09 | End: 2018-05-09 | Stop reason: HOSPADM

## 2018-05-09 RX ORDER — NALOXONE HYDROCHLORIDE 0.4 MG/ML
.1-.4 INJECTION, SOLUTION INTRAMUSCULAR; INTRAVENOUS; SUBCUTANEOUS
Status: DISCONTINUED | OUTPATIENT
Start: 2018-05-09 | End: 2018-05-09 | Stop reason: HOSPADM

## 2018-05-09 RX ORDER — ONDANSETRON 2 MG/ML
4 INJECTION INTRAMUSCULAR; INTRAVENOUS EVERY 6 HOURS PRN
Status: DISCONTINUED | OUTPATIENT
Start: 2018-05-09 | End: 2018-05-10 | Stop reason: HOSPADM

## 2018-05-09 RX ORDER — KETOROLAC TROMETHAMINE 30 MG/ML
30 INJECTION, SOLUTION INTRAMUSCULAR; INTRAVENOUS EVERY 6 HOURS
Status: DISCONTINUED | OUTPATIENT
Start: 2018-05-09 | End: 2018-05-10 | Stop reason: HOSPADM

## 2018-05-09 RX ORDER — ONDANSETRON 2 MG/ML
4 INJECTION INTRAMUSCULAR; INTRAVENOUS EVERY 30 MIN PRN
Status: DISCONTINUED | OUTPATIENT
Start: 2018-05-09 | End: 2018-05-09 | Stop reason: HOSPADM

## 2018-05-09 RX ORDER — ONDANSETRON 4 MG/1
4 TABLET, ORALLY DISINTEGRATING ORAL EVERY 6 HOURS PRN
Status: DISCONTINUED | OUTPATIENT
Start: 2018-05-09 | End: 2018-05-10 | Stop reason: HOSPADM

## 2018-05-09 RX ORDER — GLYCOPYRROLATE 0.2 MG/ML
INJECTION, SOLUTION INTRAMUSCULAR; INTRAVENOUS PRN
Status: DISCONTINUED | OUTPATIENT
Start: 2018-05-09 | End: 2018-05-09

## 2018-05-09 RX ORDER — PROPOFOL 10 MG/ML
INJECTION, EMULSION INTRAVENOUS PRN
Status: DISCONTINUED | OUTPATIENT
Start: 2018-05-09 | End: 2018-05-09

## 2018-05-09 RX ORDER — ACETAMINOPHEN 325 MG/1
650 TABLET ORAL EVERY 4 HOURS PRN
Qty: 100 TABLET | Refills: 0 | Status: SHIPPED | OUTPATIENT
Start: 2018-05-09 | End: 2020-04-19

## 2018-05-09 RX ORDER — HYDROXYZINE HYDROCHLORIDE 25 MG/1
25 TABLET, FILM COATED ORAL EVERY 6 HOURS PRN
Qty: 30 TABLET | Refills: 0 | Status: SHIPPED | OUTPATIENT
Start: 2018-05-09 | End: 2020-04-19

## 2018-05-09 RX ORDER — FENTANYL CITRATE 50 UG/ML
INJECTION, SOLUTION INTRAMUSCULAR; INTRAVENOUS PRN
Status: DISCONTINUED | OUTPATIENT
Start: 2018-05-09 | End: 2018-05-09

## 2018-05-09 RX ORDER — SODIUM CHLORIDE, SODIUM LACTATE, POTASSIUM CHLORIDE, CALCIUM CHLORIDE 600; 310; 30; 20 MG/100ML; MG/100ML; MG/100ML; MG/100ML
INJECTION, SOLUTION INTRAVENOUS CONTINUOUS PRN
Status: DISCONTINUED | OUTPATIENT
Start: 2018-05-09 | End: 2018-05-09

## 2018-05-09 RX ORDER — ACETAMINOPHEN 650 MG/1
650 SUPPOSITORY RECTAL EVERY 4 HOURS PRN
Status: DISCONTINUED | OUTPATIENT
Start: 2018-05-09 | End: 2018-05-09 | Stop reason: HOSPADM

## 2018-05-09 RX ORDER — NALOXONE HYDROCHLORIDE 0.4 MG/ML
.1-.4 INJECTION, SOLUTION INTRAMUSCULAR; INTRAVENOUS; SUBCUTANEOUS
Status: DISCONTINUED | OUTPATIENT
Start: 2018-05-09 | End: 2018-05-10 | Stop reason: HOSPADM

## 2018-05-09 RX ORDER — HYDROMORPHONE HYDROCHLORIDE 1 MG/ML
.3-.5 INJECTION, SOLUTION INTRAMUSCULAR; INTRAVENOUS; SUBCUTANEOUS
Status: DISCONTINUED | OUTPATIENT
Start: 2018-05-09 | End: 2018-05-10 | Stop reason: HOSPADM

## 2018-05-09 RX ORDER — ONDANSETRON 2 MG/ML
INJECTION INTRAMUSCULAR; INTRAVENOUS PRN
Status: DISCONTINUED | OUTPATIENT
Start: 2018-05-09 | End: 2018-05-09

## 2018-05-09 RX ORDER — CELECOXIB 200 MG/1
400 CAPSULE ORAL ONCE
Status: COMPLETED | OUTPATIENT
Start: 2018-05-09 | End: 2018-05-09

## 2018-05-09 RX ADMIN — HYDROMORPHONE HYDROCHLORIDE 0.5 MG: 1 INJECTION, SOLUTION INTRAMUSCULAR; INTRAVENOUS; SUBCUTANEOUS at 12:48

## 2018-05-09 RX ADMIN — SODIUM CHLORIDE, POTASSIUM CHLORIDE, SODIUM LACTATE AND CALCIUM CHLORIDE: 600; 310; 30; 20 INJECTION, SOLUTION INTRAVENOUS at 10:07

## 2018-05-09 RX ADMIN — SODIUM CHLORIDE 1000 ML: 900 IRRIGANT IRRIGATION at 08:16

## 2018-05-09 RX ADMIN — METOCLOPRAMIDE 10 MG: 5 INJECTION, SOLUTION INTRAMUSCULAR; INTRAVENOUS at 12:21

## 2018-05-09 RX ADMIN — NEOSTIGMINE METHYLSULFATE 3.5 MG: 1 INJECTION, SOLUTION INTRAVENOUS at 10:20

## 2018-05-09 RX ADMIN — CEFAZOLIN SODIUM 2 G: 2 INJECTION, SOLUTION INTRAVENOUS at 07:50

## 2018-05-09 RX ADMIN — HYDROMORPHONE HYDROCHLORIDE 0.5 MG: 1 INJECTION, SOLUTION INTRAMUSCULAR; INTRAVENOUS; SUBCUTANEOUS at 14:13

## 2018-05-09 RX ADMIN — FAMOTIDINE 20 MG: 10 INJECTION, SOLUTION INTRAVENOUS at 12:48

## 2018-05-09 RX ADMIN — ROCURONIUM BROMIDE 50 MG: 10 INJECTION INTRAVENOUS at 07:38

## 2018-05-09 RX ADMIN — KETOROLAC TROMETHAMINE 30 MG: 30 INJECTION, SOLUTION INTRAMUSCULAR at 17:36

## 2018-05-09 RX ADMIN — MIDAZOLAM 2 MG: 1 INJECTION INTRAMUSCULAR; INTRAVENOUS at 07:38

## 2018-05-09 RX ADMIN — SODIUM CHLORIDE, POTASSIUM CHLORIDE, SODIUM LACTATE AND CALCIUM CHLORIDE 100 ML/HR: 600; 310; 30; 20 INJECTION, SOLUTION INTRAVENOUS at 16:56

## 2018-05-09 RX ADMIN — FENTANYL CITRATE 50 MCG: 50 INJECTION, SOLUTION INTRAMUSCULAR; INTRAVENOUS at 07:57

## 2018-05-09 RX ADMIN — ONDANSETRON 4 MG: 2 INJECTION INTRAMUSCULAR; INTRAVENOUS at 10:02

## 2018-05-09 RX ADMIN — FENTANYL CITRATE 25 MCG: 50 INJECTION, SOLUTION INTRAMUSCULAR; INTRAVENOUS at 10:45

## 2018-05-09 RX ADMIN — HYDROXYZINE HYDROCHLORIDE 25 MG: 25 TABLET ORAL at 06:59

## 2018-05-09 RX ADMIN — ROCURONIUM BROMIDE 10 MG: 10 INJECTION INTRAVENOUS at 08:33

## 2018-05-09 RX ADMIN — HYDROMORPHONE HYDROCHLORIDE 0.5 MG: 1 INJECTION, SOLUTION INTRAMUSCULAR; INTRAVENOUS; SUBCUTANEOUS at 18:08

## 2018-05-09 RX ADMIN — WATER 3000 ML: 100 IRRIGANT IRRIGATION at 08:16

## 2018-05-09 RX ADMIN — FIBRINOGEN HUMAN AND THROMBIN HUMAN 5 ML: KIT at 10:09

## 2018-05-09 RX ADMIN — CELECOXIB 400 MG: 200 CAPSULE ORAL at 06:58

## 2018-05-09 RX ADMIN — DIPHENHYDRAMINE HYDROCHLORIDE 12.5 MG: 50 INJECTION, SOLUTION INTRAMUSCULAR; INTRAVENOUS at 08:16

## 2018-05-09 RX ADMIN — OXYCODONE HYDROCHLORIDE 5 MG: 5 TABLET ORAL at 21:06

## 2018-05-09 RX ADMIN — ACETAMINOPHEN 1000 MG: 500 TABLET, FILM COATED ORAL at 06:58

## 2018-05-09 RX ADMIN — ROCURONIUM BROMIDE 10 MG: 10 INJECTION INTRAVENOUS at 09:23

## 2018-05-09 RX ADMIN — ROCURONIUM BROMIDE 10 MG: 10 INJECTION INTRAVENOUS at 09:18

## 2018-05-09 RX ADMIN — PROPOFOL 200 MCG/KG/MIN: 10 INJECTION, EMULSION INTRAVENOUS at 07:45

## 2018-05-09 RX ADMIN — FENTANYL CITRATE 50 MCG: 50 INJECTION, SOLUTION INTRAMUSCULAR; INTRAVENOUS at 10:49

## 2018-05-09 RX ADMIN — LIDOCAINE HYDROCHLORIDE 80 MG: 20 INJECTION, SOLUTION INFILTRATION; PERINEURAL at 07:38

## 2018-05-09 RX ADMIN — HYDROMORPHONE HYDROCHLORIDE 0.5 MG: 1 INJECTION, SOLUTION INTRAMUSCULAR; INTRAVENOUS; SUBCUTANEOUS at 16:35

## 2018-05-09 RX ADMIN — SODIUM CHLORIDE, POTASSIUM CHLORIDE, SODIUM LACTATE AND CALCIUM CHLORIDE: 600; 310; 30; 20 INJECTION, SOLUTION INTRAVENOUS at 07:38

## 2018-05-09 RX ADMIN — DEXMEDETOMIDINE HYDROCHLORIDE 0.3 MCG/KG/HR: 100 INJECTION, SOLUTION INTRAVENOUS at 07:45

## 2018-05-09 RX ADMIN — FENTANYL CITRATE 50 MCG: 50 INJECTION, SOLUTION INTRAMUSCULAR; INTRAVENOUS at 07:38

## 2018-05-09 RX ADMIN — PROPOFOL 200 MG: 10 INJECTION, EMULSION INTRAVENOUS at 07:38

## 2018-05-09 RX ADMIN — GLYCOPYRROLATE 0.5 MG: 0.2 INJECTION, SOLUTION INTRAMUSCULAR; INTRAVENOUS at 10:20

## 2018-05-09 RX ADMIN — BUPIVACAINE HYDROCHLORIDE AND EPINEPHRINE BITARTRATE 30 ML: 2.5; .005 INJECTION, SOLUTION EPIDURAL; INFILTRATION; INTRACAUDAL; PERINEURAL at 10:14

## 2018-05-09 RX ADMIN — FENTANYL CITRATE 50 MCG: 50 INJECTION, SOLUTION INTRAMUSCULAR; INTRAVENOUS at 08:07

## 2018-05-09 NOTE — PROGRESS NOTES
Observation RN aware of printed (unsigned) scripts in patient chart.  Dr Wells aware and will sign.

## 2018-05-09 NOTE — ANESTHESIA CARE TRANSFER NOTE
Patient: Julia Smith    Procedure(s):  CYSTOSCOPY, PLACEMENT OF BILATERAL URETERAL CATHETERS (ROGERS) DAVINCI LAPAROSCOPY, LYSIS ADHESIONS, LEFT URETEROLYSIS (HEEGARD)  - Wound Class: I-Clean   - Wound Class: II-Clean Contaminated    Diagnosis: PELVIC PAIN  Diagnosis Additional Information: No value filed.    Anesthesia Type:   General, ETT     Note:  Airway :Face Mask  Patient transferred to:PACU  Comments: Neuromuscular blockade reversed after TOF 4/4, spontaneous respirations, adequate tidal volumes, followed commands to voice, oropharynx suctioned with soft flexible catheter, extubated atraumatically, extubated with suction, airway patent after extubation.  Oxygen via facemask at 10 liters per minute to PACU. Oxygen tubing connected to wall O2 in PACU, SpO2, NiBP, and EKG monitors and alarms on and functioning, Irene Hugger warmer connected to patient gown, report on patient's clinical status given to PACU RN, RN questions answered. Handoff Report: Identifed the Patient, Identified the Reponsible Provider, Reviewed the pertinent medical history, Discussed the surgical course, Reviewed Intra-OP anesthesia mangement and issues during anesthesia, Set expectations for post-procedure period and Allowed opportunity for questions and acknowledgement of understanding      Vitals: (Last set prior to Anesthesia Care Transfer)    CRNA VITALS  5/9/2018 1000 - 5/9/2018 1035      5/9/2018             Resp Rate (observed): 14                Electronically Signed By: POONAM Salguero CRNA  May 9, 2018  10:35 AM

## 2018-05-09 NOTE — ANESTHESIA PREPROCEDURE EVALUATION
Anesthesia Evaluation     . Pt has had prior anesthetic.     History of anesthetic complications (slow to wake up)          ROS/MED HX    ENT/Pulmonary:     (+), . Other pulmonary disease history of TMJ problems.   (-) sleep apnea   Neurologic:     (+)migraines,     Cardiovascular:     (+) ----. : . . fainting (syncope). :. .       METS/Exercise Tolerance:  >4 METS   Hematologic:         Musculoskeletal:   (+) , , other musculoskeletal- Fibromyalgia      GI/Hepatic:     (+) GERD       Renal/Genitourinary: Comment: PCOS, Pelvic Pain, Endometriosis        Endo:         Psychiatric: Comment: PTSD  Panic disorder  OCD    (+) psychiatric history anxiety, bipolar and other (comment)      Infectious Disease:         Malignancy:         Other: Comment: Pelvic pain, takes only gabepentin  Endometriosis  Polycystic ovarian syndrome   (+) H/O Chronic Pain,                   Physical Exam  Normal systems: cardiovascular, pulmonary and dental    Airway   Mallampati: II  TM distance: >3 FB  Neck ROM: full    Dental     Cardiovascular       Pulmonary                         Anesthesia Plan      History & Physical Review  History and physical reviewed and following examination; no interval change.    ASA Status:  2 .    NPO Status:  > 8 hours    Plan for General and ETT with Propofol induction. Maintenance will be TIVA.    PONV prophylaxis:  Ondansetron (or other 5HT-3)  NO DECADRON    Propofol/Precedex TIVA  Preop vistaril 25mg, tylenol 1000mg, celebrex 400mg       Postoperative Care  Postoperative pain management:  IV analgesics and Multi-modal analgesia.      Consents  Anesthetic plan, risks, benefits and alternatives discussed with:  Patient..                          .

## 2018-05-09 NOTE — IP AVS SNAPSHOT
[facility] Observation Unit    [address] [place] [address]    Phone:  [phone]                                       After Visit Summary   5/9/2018    Julia Smith    MRN: [id number]           After Visit Summary Signature Page     I have received my discharge instructions, and my questions have been answered. I have discussed any challenges I see with this plan with the nurse or doctor.    ..........................................................................................................................................  Patient/Patient Representative Signature      ..........................................................................................................................................  Patient Representative Print Name and Relationship to Patient    ..................................................               ................................................  Date                                            Time    ..........................................................................................................................................  Reviewed by Signature/Title    ...................................................              ..............................................  Date                                                            Time

## 2018-05-09 NOTE — PLAN OF CARE
Problem: Patient Care Overview  Goal: Plan of Care/Patient Progress Review  Outcome: No Change  VSS. A/O. Up SBA. Feels need to void at all times. DTV. Attempted twice. Bladder scanned for 150. Lap sites CDI. Tolerating water/ice chips. IV Dilaudid given X 2 with some relief.

## 2018-05-09 NOTE — IP AVS SNAPSHOT
MRN:[id number]                      After Visit Summary   5/9/2018    Julia Smith    MRN: [id number]           Thank you!     Thank you for choosing [facility] for your care. Our goal is always to provide you with excellent care. Hearing back from our patients is one way we can continue to improve our services. Please take a few minutes to complete the written survey that you may receive in the mail after you visit with us. Thank you!        Patient Information     Date Of Birth          1985        Designated Caregiver       Most Recent Value    Caregiver    Will someone help with your care after discharge? yes    Name of designated caregiver Juan Smith   - spouse    Phone number of caregiver [phone]    Caregiver address [address]  [address]       [place]  [id number]      About your hospital stay     You were admitted on:  May 9, 2018 You last received care in the:  [facility]    You were discharged on:  May 10, 2018       Who to Call     For medical emergencies, please call 911.  For non-urgent questions about your medical care, please call your primary care provider or clinic, [phone]  For questions related to your surgery, please call your surgery clinic        Attending Provider     Provider Specialty    Marvin Umana MD Urology    [facility], Justin Ruth MD OB/Gyn       Primary Care Provider Office Phone # Fax #    Jillian Palma [phone]      After Care Instructions     Diet Instructions       Resume pre-procedure diet            Discharge Instructions       Follow up appointment as instructed by Surgeon and or RN            No lifting        No lifting over 25 lbs and no strenuous physical activity for 2 weeks            Shower       No shower for 24 hours post procedure. May shower Postoperative Day (POD)  1                  Further instructions from your care team       Today you were given 975 mg of Tylenol  "at ***. The recommended daily maximum dose is 4000 mg.     Pending Results     Date and Time Order Name Status Description    5/9/2018 0817 Surgical pathology exam In process             Statement of Approval     Ordered          05/10/18 0747  I have reviewed and agree with all the recommendations and orders detailed in this document.  EFFECTIVE NOW     Approved and electronically signed by:  Justin Wells MD             Admission Information     Date & Time Provider Department Dept. Phone    5/9/2018 Justin Wells MD [facility] [phone]      Your Vitals Were     Blood Pressure Temperature Respirations Height Weight Last Period    93/59 (BP Location: Right arm) 99.1  F (37.3  C) (Oral) 16 1.626 m (5' 4\") 64.9 kg (143 lb) 10/23/2014 (LMP Unknown)    Pulse Oximetry BMI (Body Mass Index)                96% 24.55 kg/m2          [facility] Information     [email or web address] gives you secure access to your electronic health record. If you see a primary care provider, you can also send messages to your care team and make appointments. If you have questions, please call your primary care clinic.  If you do not have a primary care provider, please call [phone] and they will assist you.        Care EveryWhere ID     This is your Care EveryWhere ID. This could be used by other organizations to access your [facility] medical records  [id number]        Equal Access to Services     CHICHI HUGHES AH: Hadii ty Perrin, waaxda luqadaha, qaybta kaalmashimon roberts. So [facility] [phone].    ATENCIÓN: Si habla español, tiene a smith disposición servicios gratuitos de asistencia lingüística. Llame al [phone].    We comply with applicable federal civil rights laws and [place] laws. We do not discriminate on the basis of race, color, national origin, age, disability, sex, sexual orientation, or gender identity.               Review of your medicines    "   START taking        Dose / Directions    hydrOXYzine 25 MG tablet   Commonly known as:  ATARAX   Used for:  Acute post-operative pain        Dose:  25 mg   Take 1 tablet (25 mg) by mouth every 6 hours as needed for itching (and nausea)   Quantity:  30 tablet   Refills:  0       oxyCODONE IR 5 MG tablet   Commonly known as:  ROXICODONE   Used for:  Acute post-operative pain        Dose:  5-10 mg   Take 1-2 tablets (5-10 mg) by mouth every 3 hours as needed for pain or other (Moderate to Severe)   Quantity:  40 tablet   Refills:  0         CONTINUE these medicines which may have CHANGED, or have new prescriptions. If we are uncertain of the size of tablets/capsules you have at home, strength may be listed as something that might have changed.        Dose / Directions    acetaminophen 325 MG tablet   Commonly known as:  TYLENOL   This may have changed:    - medication strength  - how much to take  - when to take this  - reasons to take this   Used for:  Acute post-operative pain        Dose:  650 mg   Take 2 tablets (650 mg) by mouth every 4 hours as needed for other (mild pain)   Quantity:  100 tablet   Refills:  0         CONTINUE these medicines which have NOT CHANGED        Dose / Directions    ADDERALL XR PO        Dose:  10 mg   Take 10 mg by mouth every morning (patient takes 2 X 5 mg = 10 mg dose)   Refills:  0       AMITRIPTYLINE HCL PO        Dose:  25 mg   Take 25 mg by mouth At Bedtime   Refills:  0       CLONAZEPAM PO        Dose:  1 mg   Take 1 mg by mouth 2 times daily as needed for anxiety   Refills:  0       ESTRADIOL PO        Dose:  1 mg   Take 1 mg by mouth every morning   Refills:  0       FLONASE ALLERGY RELIEF NA        Dose:  1 spray   Spray 1 spray in nostril daily as needed   Refills:  0       GABAPENTIN PO        Dose:  400 mg   Take 400 mg by mouth 3 times daily   Refills:  0       LAMOTRIGINE PO        Dose:  100 mg   Take 100 mg by mouth 2 times daily   Refills:  0       MIRALAX Packet    Generic drug:  polyethylene glycol        Dose:  17 g   Take 17 g by mouth daily as needed for constipation   Refills:  0       nicotine 10 MG Inhaler   Commonly known as:  NICOTROL        Dose:  6-16 Cartridge   Inhale 6-16 Cartridges into the lungs daily as needed for smoking cessation   Refills:  0       promethazine 25 MG tablet   Commonly known as:  PHENERGAN   Used for:  Nausea and vomiting, intractability of vomiting not specified, unspecified vomiting type        Dose:  25 mg   Take 1 tablet (25 mg) by mouth every 6 hours as needed for nausea   Quantity:  15 tablet   Refills:  0       SERTRALINE HCL PO        Dose:  150 mg   Take 150 mg by mouth every morning (patient takes 1.5 X 100 mg = 150 mg dose)   Refills:  0       VALIUM PO        Dose:  10 mg   Place 10 mg vaginally daily as needed   Refills:  0         STOP taking     NAPROXEN PO                Where to get your medicines      Some of these will need a paper prescription and others can be bought over the counter. Ask your nurse if you have questions.     Bring a paper prescription for each of these medications     acetaminophen 325 MG tablet    hydrOXYzine 25 MG tablet    oxyCODONE IR 5 MG tablet                Protect others around you: Learn how to safely use, store and throw away your medicines at www.disposemymeds.org.        Information about OPIOIDS     PRESCRIPTION OPIOIDS: WHAT YOU NEED TO KNOW   You have a prescription for an opioid (narcotic) pain medicine. Opioids can cause addiction. If you have a history of chemical dependency of any type, you are at a higher risk of becoming addicted to opioids. Only take this medicine after all other options have been tried. Take it for as short a time and as few doses as possible.     Do not:    Drive. If you drive while taking these medicines, you could be arrested for driving under the influence (DUI).    Operate heavy machinery    Do any other dangerous activities while taking these medicines.      Drink any alcohol while taking these medicines.      Take with any other medicines that contain acetaminophen. Read all labels carefully. Look for the word  acetaminophen  or  Tylenol.  Ask your pharmacist if you have questions or are unsure.    Store your pills in a secure place, locked if possible. We will not replace any lost or stolen medicine. If you don t finish your medicine, please throw away (dispose) as directed by your pharmacist. The [place] Pollution Control Agency has more information about safe disposal: https://www.pca.[facility].[place].us/living-green/managing-unwanted-medications    All opioids tend to cause constipation. Drink plenty of water and eat foods that have a lot of fiber, such as fruits, vegetables, prune juice, apple juice and high-fiber cereal. Take a laxative (Miralax, milk of magnesia, Colace, Senna) if you don t move your bowels at least every other day.              Medication List: This is a list of all your medications and when to take them. Check marks below indicate your daily home schedule. Keep this list as a reference.      Medications           Morning Afternoon Evening Bedtime As Needed    acetaminophen 325 MG tablet   Commonly known as:  TYLENOL   Take 2 tablets (650 mg) by mouth every 4 hours as needed for other (mild pain)   Last time this was given:  1,000 mg on 5/9/2018  6:58 AM                                ADDERALL XR PO   Take 10 mg by mouth every morning (patient takes 2 X 5 mg = 10 mg dose)                                AMITRIPTYLINE HCL PO   Take 25 mg by mouth At Bedtime                                CLONAZEPAM PO   Take 1 mg by mouth 2 times daily as needed for anxiety                                ESTRADIOL PO   Take 1 mg by mouth every morning                                FLONASE ALLERGY RELIEF NA   Spray 1 spray in nostril daily as needed                                GABAPENTIN PO   Take 400 mg by mouth 3 times daily                                 hydrOXYzine 25 MG tablet   Commonly known as:  ATARAX   Take 1 tablet (25 mg) by mouth every 6 hours as needed for itching (and nausea)   Last time this was given:  25 mg on 5/9/2018  6:59 AM                                LAMOTRIGINE PO   Take 100 mg by mouth 2 times daily                                MIRALAX Packet   Take 17 g by mouth daily as needed for constipation   Generic drug:  polyethylene glycol                                nicotine 10 MG Inhaler   Commonly known as:  NICOTROL   Inhale 6-16 Cartridges into the lungs daily as needed for smoking cessation                                oxyCODONE IR 5 MG tablet   Commonly known as:  ROXICODONE   Take 1-2 tablets (5-10 mg) by mouth every 3 hours as needed for pain or other (Moderate to Severe)   Last time this was given:  10 mg on 5/10/2018  9:35 AM                                promethazine 25 MG tablet   Commonly known as:  PHENERGAN   Take 1 tablet (25 mg) by mouth every 6 hours as needed for nausea                                SERTRALINE HCL PO   Take 150 mg by mouth every morning (patient takes 1.5 X 100 mg = 150 mg dose)                                VALIUM PO   Place 10 mg vaginally daily as needed

## 2018-05-09 NOTE — BRIEF OP NOTE
[facility] Brief Operative Note    Pre-operative diagnosis: PELVIC PAIN   Post-operative diagnosis same   Procedure: Procedure(s):  CYSTOSCOPY, PLACEMENT OF BILATERAL URETERAL CATHETERS (SUE) DAVINCI LAPAROSCOPY, LYSIS ADHESIONS, LEFT URETEROLYSIS (KIN)  - Wound Class: I-Clean   - Wound Class: II-Clean Contaminated   Surgeon(s): Surgeon(s) and Role:  Panel 1:     * Justin Wells MD - Primary     * Skylar Diaz PA-C    Panel 2:     * Marvin Umana MD - Primary   Estimated blood loss: 50 mL    Specimens:   ID Type Source Tests Collected by Time Destination   A : SIGMOID ADHESION Tissue Large Intestine, Sigmoid SURGICAL PATHOLOGY EXAM Justin Wells MD 5/9/2018  8:17 AM    B : ANTERIOR CUL DE SAC Tissue Pelvis SURGICAL PATHOLOGY EXAM Justin Wells MD 5/9/2018  8:18 AM    C : LEFT SIDEWALL ADHESION Tissue Pelvic Sidewall SURGICAL PATHOLOGY EXAM Justin Wells MD 5/9/2018  8:22 AM    D : VAGINAL APEX BIOPSIES Tissue Vagina SURGICAL PATHOLOGY EXAM Justin Wells MD 5/9/2018  8:23 AM    E : RIGHT PELVIC SIDEWALL Tissue Pelvic Sidewall SURGICAL PATHOLOGY EXAM Justin Wells MD 5/9/2018  8:27 AM    F : LEFT PELVIC SIDEWALL Tissue Pelvic Sidewall SURGICAL PATHOLOGY EXAM Justin Wells MD 5/9/2018  8:59 AM    G : POSTERIOR CUL DE SAC  Tissue Pelvis SURGICAL PATHOLOGY EXAM Justin Wells MD 5/9/2018  9:18 AM    H : LEFT BROAD LIGAMENT Tissue Pelvis, Left SURGICAL PATHOLOGY EXAM Justin Wells MD 5/9/2018  9:26 AM       Findings: Normal upper abdomen\  Soft adhesions of rectum to sidewall  Thicker adhesions of rectum to right sidewall overlying fossa and ureter  Fibrotic peritoneum overlying bilater sidewalls necessitating ureterolysis on left so I could excise the peritoneum  Fibrotic type peritoneal changes over left anterior cul de sac, vaginal apex, left broad ligament and posterior cul de sac  Normal cystoscopy by   Dong

## 2018-05-09 NOTE — ANESTHESIA POSTPROCEDURE EVALUATION
Patient: Julia Smith    Procedure(s):  CYSTOSCOPY, PLACEMENT OF BILATERAL URETERAL CATHETERS (ROGERS) DAVINCI LAPAROSCOPY, LYSIS ADHESIONS, LEFT URETEROLYSIS ([place])  - Wound Class: I-Clean   - Wound Class: II-Clean Contaminated    Diagnosis:PELVIC PAIN  Diagnosis Additional Information: No value filed.    Anesthesia Type:  General, ETT    Note:  Anesthesia Post Evaluation    Patient location during evaluation: PACU  Patient participation: Able to fully participate in evaluation  Level of consciousness: awake  Pain management: adequate  Airway patency: patent  Cardiovascular status: acceptable  Respiratory status: acceptable  Hydration status: acceptable  PONV: none     Anesthetic complications: None          Last vitals:  Vitals:    05/09/18 1248 05/09/18 1304 05/09/18 1413   BP:   115/82   Resp: 17 15 17   Temp:      SpO2:   93%         Electronically Signed By: NEFTALI OLSON MD  May 9, 2018  2:24 PM

## 2018-05-09 NOTE — OP NOTE
Procedure Date: 05/09/2018      DATE OF PROCEDURE:  05/09/2018      PREOPERATIVE DIAGNOSIS:  Pelvic pain.      POSTOPERATIVE DIAGNOSIS:  Pelvic pain.      PROCEDURES:   1.  Da Prerna laparoscopy.   2.  Lysis of adhesions.   3.  Left ureterolysis.      SURGEON:  Justin Wells MD      ASSISTANT:  Skylar Diaz PA-C      ANESTHESIA:  General.      ESTIMATED BLOOD LOSS:  50 mL.      COMPLICATIONS:  None.      FINDINGS:   1.  There was a normal upper abdomen without evidence of perihepatic adhesions or diaphragmatic endometriosis.   2.  There were soft adhesions of the rectum to the pelvic sidewall on the right.   There were thicker adhesions of the rectum to the left pelvic sidewall and these were covering the left ovarian fossa as well as the ureter.   3.  There was a fairly dense fibrotic type peritoneum overlying the bilateral pelvic sidewalls, necessitating ureterolysis on the left, so I could excise this peritoneum.     4.  There was fibrotic type peritoneal changes of the left anterior cul-de-sac, the vaginal apex, the left broad ligament, and the posterior cul-de-sac on the left.   5.  There is normal cystoscopy by Dr. Umana at the beginning of the case without evidence of interstitial cystitis.      INDICATIONS FOR PROCEDURE:  The patient is a 33-year-old female with a history of pelvic pain and endometriosis.  She has had persistent and worsening pelvic pain, the description of which was suggestive of possible adhesive disease.  Given her lack of relief from more conservative measures, we felt like it was reasonable to go ahead and take one more look to rule out significant adhesions to try to give her some relief.  Given the number of surgeries she has had in the past, we did request to have Urology place ureteral catheters to assist in identifying the ureters.        OPERATIVE PROCEDURE:  After administration of anesthesia, the patient was placed in dorsal lithotomy position, prepped and draped in normal  sterile fashion.  Dr. Umana, urology, went ahead and placed ureteral catheters first.  When this was completed, I went ahead and placed a Veress needle in the left upper quadrant without difficulty.  A pneumoperitoneum was established.  I then went ahead and made an incision in the left upper quadrant and placed a 5 mm blunt trocar to its correct intraperitoneal position.  This was done with a Visiport, so I could go layer by layer until I entered the peritoneal cavity.  Of note, before I placed the Veress needle or the trocar I did ensure that there was an orogastric tube placed.  When the 5 mm camera was placed I could see that there were no anterior abdominal wall adhesions to prevent placement of the other trocars.  We went ahead and placed an 8 mm da Prerna trocar through the umbilical incision, an 8 mm trocar in the right lower quadrant, another 8 mm da Prerna port in the left lower quadrant, and 8 mm assistant port placed in the right upper quadrant.  At this point, the patient was placed into steep Trendelenburg and the da Prerna robot brought up and docked without complications.  A monopolar hook was placed on the right and a bipolar fenestrated grasper placed on the left.  I then scrubbed down and moved to the console.  I looked at the pelvis and upper abdomen with the findings as previously mentioned.  I began on the right side and began to take down the soft adhesions of the rectum to the right pelvic sidewall to improve my visualization of the pelvic sidewall.  These were for the most part soft and filmy, and when these were taken down I could better see the sidewall.  I could see the ureter perfectly on the right side and began to excise the fibrotic type peritoneum on the right pelvic sidewall.  I did this by entering retroperitoneally and using gentle traction, countertraction.  At this point, it felt like my monopolar scissors would be better served, so I went ahead and switched my hook out to the  scissors so I could more easily cut this peritoneum without use of electrocautery.  In this fashion, I was able to excise most of the peritoneum of the right pelvic sidewall both lateral and medial to the ureter.  This will be sent for permanent pathology.        I then directed my attention to the left side.  The rectum on this side was more thickly adherent to the sidewall and obstructing my visualization of the pelvic sidewall.  Again, these adhesions were fairly soft and were not dense or firm.  I went ahead and began to gently take these down again with minimal use of electrocautery and gentle traction, countertraction until the rectum was away from the left pelvic sidewall, and I could see the ovarian fossa and posterior cul-de-sac.  I went ahead and entered retroperitoneally and began to gently lift up the very firm and fibrotic peritoneum medial to the ureter and began to take this down gently pushing the ureter away from it as I moved inferiorly and medially.  This tissue was then removed and will be sent for permanent pathology.  Again, left to the ureter I, in a similar fashion, began to tent up the peritoneum up and away from the ureter, and this tissue was excised.  This was very dense fibrotic tissue and took a fair amount of time to gently excise this.  I continued my dissection down and removed the left aspect of the posterior cul-de-sac, which had some questionable white fibrotic type changes.  Again, I was very careful to use minimal electrocautery, traction, countertraction and lifting this peritoneum up and away from the rectal tissue.  I went ahead and excised the fibrotic tissue over the vaginal apex and the left anterior cul-de-sac and broad ligament until all areas of fibrotic type changes had been removed.  Good hemostasis was noted, although there were some vessels that needed to be clipped on this left side and there was some diffuse peritoneal oozing which required placement of Surgicel  powder and other hemostatic agents.  At the end of the case good hemostasis was noted.  I went ahead and placed 3 sheets of Interceed, one in the posterior cul-de-sac and one each on either pelvic sidewall.  At this point, all procedures had been ended.  The instrumentation and trocars were removed, robot was undocked.  Skin was closed with 4-0 Vicryl in subcuticular fashion.  The Galindo catheter and ureteral catheters were removed at the end of the case.         DILLON MCKEON MD             D: 2018   T: 2018   MT: ROSCOE      Name:     WADE CALVO   MRN:      [id number]        Account:        [id number]   :      1985           Procedure Date: 2018      Document: [id number]

## 2018-05-09 NOTE — PROGRESS NOTES
Admission medication history interview status for the 5/9/2018  admission is complete. See EPIC admission navigator for prior to admission medications     Medication history source reliability:Good    Medication history interview source(s):Patient    Medication history resources (including written lists, pill bottles, clinic record):Patient mailed in a med list prior to day of procedure.    Primary pharmacy.[facility], [place]    Additional medication history information not noted on PTA med list :Verified adderall XR and clonazepam with [facility] pharmacy.    Time spent in this activity: 45 minutes    Prior to Admission medications    Medication Sig Last Dose Taking? Auth Provider   Acetaminophen (TYLENOL PO) Take 1,000 mg by mouth every 8 hours as needed for mild pain or fever Over 1 week ago at prn Yes Reported, Patient   AMITRIPTYLINE HCL PO Take 25 mg by mouth At Bedtime 5/8/2018 at 2100 Yes Reported, Patient   Amphetamine-Dextroamphetamine (ADDERALL XR PO) Take 10 mg by mouth every morning (patient takes 2 X 5 mg = 10 mg dose) 5/8/2018 at am Yes Reported, Patient   CLONAZEPAM PO Take 1 mg by mouth 2 times daily as needed for anxiety  5/8/2018 at 2100 Yes Reported, Patient   DiazePAM (VALIUM PO) Place 10 mg vaginally daily as needed  Over 1 month ago at prn Yes Reported, Patient   ESTRADIOL PO Take 1 mg by mouth every morning  5/8/2018 at am Yes Reported, Patient   Fluticasone Propionate (FLONASE ALLERGY RELIEF NA) Spray 1 spray in nostril daily as needed Over 1 week ago at prn Yes Reported, Patient   GABAPENTIN PO Take 400 mg by mouth 3 times daily  5/8/2018 at 2100 Yes Reported, Patient   LAMOTRIGINE PO Take 100 mg by mouth 2 times daily 5/8/2018 at 2100 Yes Reported, Patient   NAPROXEN PO Take 500 mg by mouth 2 times daily as needed for moderate pain Over 1 month ago at prn Yes Reported, Patient   nicotine (NICOTROL) 10 MG Inhaler Inhale 6-16 Cartridges into the lungs daily as needed for smoking  cessation Over 1 month ago at prn Yes Reported, Patient   polyethylene glycol (MIRALAX) Packet Take 17 g by mouth daily as needed for constipation  Over 1 month ago at prn Yes Reported, Patient   promethazine (PHENERGAN) 25 MG tablet Take 1 tablet (25 mg) by mouth every 6 hours as needed for nausea Over 1 week ago at prn Yes Nikita Marquez, PA-C   SERTRALINE HCL PO Take 150 mg by mouth every morning (patient takes 1.5 X 100 mg = 150 mg dose) 5/8/2018 at am Yes Reported, Patient

## 2018-05-10 VITALS
RESPIRATION RATE: 16 BRPM | BODY MASS INDEX: 24.41 KG/M2 | OXYGEN SATURATION: 96 % | HEIGHT: 64 IN | TEMPERATURE: 99.1 F | DIASTOLIC BLOOD PRESSURE: 59 MMHG | WEIGHT: 143 LBS | SYSTOLIC BLOOD PRESSURE: 93 MMHG

## 2018-05-10 LAB
COPATH REPORT: NORMAL
GLUCOSE BLDC GLUCOMTR-MCNC: 108 MG/DL (ref 70–99)

## 2018-05-10 PROCEDURE — 25000128 H RX IP 250 OP 636: Performed by: OBSTETRICS & GYNECOLOGY

## 2018-05-10 PROCEDURE — 82962 GLUCOSE BLOOD TEST: CPT

## 2018-05-10 PROCEDURE — 40000934 ZZH STATISTIC OUTPATIENT (NON-OBS) DAY

## 2018-05-10 PROCEDURE — 25000132 ZZH RX MED GY IP 250 OP 250 PS 637: Performed by: OBSTETRICS & GYNECOLOGY

## 2018-05-10 RX ORDER — PROMETHAZINE HYDROCHLORIDE 25 MG/1
25 TABLET ORAL EVERY 6 HOURS PRN
Status: DISCONTINUED | OUTPATIENT
Start: 2018-05-10 | End: 2018-05-10 | Stop reason: HOSPADM

## 2018-05-10 RX ORDER — GABAPENTIN 400 MG/1
400 CAPSULE ORAL 3 TIMES DAILY
Status: DISCONTINUED | OUTPATIENT
Start: 2018-05-10 | End: 2018-05-10 | Stop reason: HOSPADM

## 2018-05-10 RX ORDER — LAMOTRIGINE 100 MG/1
100 TABLET ORAL 2 TIMES DAILY
Status: DISCONTINUED | OUTPATIENT
Start: 2018-05-10 | End: 2018-05-10 | Stop reason: HOSPADM

## 2018-05-10 RX ORDER — DEXTROAMPHETAMINE SACCHARATE, AMPHETAMINE ASPARTATE MONOHYDRATE, DEXTROAMPHETAMINE SULFATE AND AMPHETAMINE SULFATE 2.5; 2.5; 2.5; 2.5 MG/1; MG/1; MG/1; MG/1
10 CAPSULE, EXTENDED RELEASE ORAL EVERY MORNING
Status: DISCONTINUED | OUTPATIENT
Start: 2018-05-10 | End: 2018-05-10 | Stop reason: HOSPADM

## 2018-05-10 RX ORDER — ESTRADIOL 1 MG/1
1 TABLET ORAL EVERY MORNING
Status: DISCONTINUED | OUTPATIENT
Start: 2018-05-10 | End: 2018-05-10 | Stop reason: HOSPADM

## 2018-05-10 RX ORDER — CLONAZEPAM 0.5 MG/1
1 TABLET ORAL 2 TIMES DAILY PRN
Status: DISCONTINUED | OUTPATIENT
Start: 2018-05-10 | End: 2018-05-10 | Stop reason: HOSPADM

## 2018-05-10 RX ADMIN — OXYCODONE HYDROCHLORIDE 10 MG: 5 TABLET ORAL at 09:35

## 2018-05-10 RX ADMIN — OXYCODONE HYDROCHLORIDE 10 MG: 5 TABLET ORAL at 01:03

## 2018-05-10 RX ADMIN — KETOROLAC TROMETHAMINE 30 MG: 30 INJECTION, SOLUTION INTRAMUSCULAR at 00:40

## 2018-05-10 RX ADMIN — HYDROMORPHONE HYDROCHLORIDE 0.5 MG: 1 INJECTION, SOLUTION INTRAMUSCULAR; INTRAVENOUS; SUBCUTANEOUS at 03:04

## 2018-05-10 RX ADMIN — KETOROLAC TROMETHAMINE 30 MG: 30 INJECTION, SOLUTION INTRAMUSCULAR at 06:52

## 2018-05-10 RX ADMIN — OXYCODONE HYDROCHLORIDE 10 MG: 5 TABLET ORAL at 06:57

## 2018-05-10 ASSESSMENT — PAIN DESCRIPTION - DESCRIPTORS: DESCRIPTORS: SORE

## 2018-05-10 NOTE — PROGRESS NOTES
Doing ok. Comfortable appearing although she says she is 8/10 pain. Incisions dry.  Bladder retention  VSS afebrile  Incisions dry. Case disussed  A: stable except for bladder dysfunciton  P: Discharge home later this morning. If unable to void will send home with a greene catheter to have removed on Monday.

## 2018-05-10 NOTE — PLAN OF CARE
Problem: Patient Care Overview  Goal: Discharge Needs Assessment  Outcome: Improving  Alert and oriented, vss. Tolerating diet. Hypo bowle sounds. Stab sites dried drainage. Straight cathed for 400cc.  Up in kemp pain relieved with meds.

## 2018-05-10 NOTE — PLAN OF CARE
Problem: Patient Care Overview  Goal: Plan of Care/Patient Progress Review  Outcome: No Change  A&O x4. VSS on RA. Transfers independently. 6 lap sites CDI, dried blood noted. Unable to void, straight cathed 800 mLs. PRN Oxycodone and Dilaudid given with little relief.

## 2018-05-10 NOTE — PLAN OF CARE
Problem: Patient Care Overview  Goal: Plan of Care/Patient Progress Review  Outcome: Adequate for Discharge Date Met: 05/10/18  Pt a/o. VSS. Pain controlled. Tolerating reg diet. Up SBA. Lap site CDI. Unable to void. Greene placed. Pt to follow up on Monday with dr mcdonnell for greene removal. Pt aware.  Dc paper work reviewed with pt. Verbalizes understanding. PIV removed. Follow up aware. Pt to  dc meds from [facility] pharmacy. All questions answered. Dc home today

## 2018-05-14 NOTE — OP NOTE
Procedure Date: 2018      DATE OF PROCEDURE:  2018      PREOPERATIVE DIAGNOSIS:  Pelvic pain.      POSTOPERATIVE DIAGNOSIS:  Pelvic pain.      PROCEDURE PERFORMED:  Cystoscopy and placement of bilateral ureteral catheters.      PREOPERATIVE STATUS:  Wade Smith is a 33-year-old female with chronic pelvic pain.  She has had multiple prior procedures.  Dr. Justin Wells is performing robot-assisted pelvic exploration.  He requested placement of bilateral ureteral catheters for intraoperative identification of the ureters.  I met with the patient in the preoperative area and discussed the procedure with her.  The intent is to have the catheters removed at the completion of the surgery.      DESCRIPTION OF PROCEDURE:  The patient was identified and brought to the operating room.  After adequate general anesthesia, she was placed in dorsal lithotomy position and prepped and draped in the usual sterile fashion.  A timeout was undertaken.  I performed cystoscopy.  The urethra and bladder were noted to be completely normal.  I then passed a Sensor guidewire up the right ureter and over this advanced a 6-Slovak open-ended ureteral catheter into the collecting system.  The cystoscope was removed and reintroduced, and in a similar fashion, a 6-Slovak open-ended catheter was placed on the other side.  The cystoscope was removed and a Galindo catheter was placed.  The ureteral catheters were secured to the Galindo catheter with a silk tie.  This completed my procedure.  Dr. Wells will now proceed with his part of the surgery.         MARIAH ROGERS MD             D: 2018   T: 2018   MT: CC      Name:     WADE SMITH   MRN:      [id number]        Account:        [id number]   :      1985           Procedure Date: 2018      Document: [id number]       cc: Mariah Rogers MD

## 2018-08-28 ENCOUNTER — TELEPHONE (OUTPATIENT)
Dept: FAMILY MEDICINE | Facility: CLINIC | Age: 33
End: 2018-08-28

## 2018-08-28 ENCOUNTER — OFFICE VISIT (OUTPATIENT)
Dept: URGENT CARE | Facility: URGENT CARE | Age: 33
End: 2018-08-28
Payer: COMMERCIAL

## 2018-08-28 VITALS
RESPIRATION RATE: 16 BRPM | OXYGEN SATURATION: 97 % | HEART RATE: 114 BPM | BODY MASS INDEX: 23.91 KG/M2 | TEMPERATURE: 99.5 F | WEIGHT: 139.3 LBS | SYSTOLIC BLOOD PRESSURE: 100 MMHG | DIASTOLIC BLOOD PRESSURE: 60 MMHG

## 2018-08-28 DIAGNOSIS — R30.0 DYSURIA: Primary | ICD-10-CM

## 2018-08-28 DIAGNOSIS — R82.90 NONSPECIFIC FINDING ON EXAMINATION OF URINE: ICD-10-CM

## 2018-08-28 LAB
ALBUMIN UR-MCNC: ABNORMAL MG/DL
APPEARANCE UR: CLEAR
BACTERIA #/AREA URNS HPF: ABNORMAL /HPF
BILIRUB UR QL STRIP: ABNORMAL
COLOR UR AUTO: YELLOW
GLUCOSE UR STRIP-MCNC: NEGATIVE MG/DL
HGB UR QL STRIP: ABNORMAL
HYALINE CASTS #/AREA URNS LPF: ABNORMAL /LPF
KETONES UR STRIP-MCNC: ABNORMAL MG/DL
LEUKOCYTE ESTERASE UR QL STRIP: NEGATIVE
MUCOUS THREADS #/AREA URNS LPF: PRESENT /LPF
NITRATE UR QL: POSITIVE
NON-SQ EPI CELLS #/AREA URNS LPF: ABNORMAL /LPF
PH UR STRIP: 5.5 PH (ref 5–7)
RBC #/AREA URNS AUTO: ABNORMAL /HPF
SOURCE: ABNORMAL
SP GR UR STRIP: 1.02 (ref 1–1.03)
UROBILINOGEN UR STRIP-ACNC: 1 EU/DL (ref 0.2–1)
WBC #/AREA URNS AUTO: ABNORMAL /HPF

## 2018-08-28 PROCEDURE — 87086 URINE CULTURE/COLONY COUNT: CPT | Performed by: FAMILY MEDICINE

## 2018-08-28 PROCEDURE — 99213 OFFICE O/P EST LOW 20 MIN: CPT | Performed by: FAMILY MEDICINE

## 2018-08-28 PROCEDURE — 81001 URINALYSIS AUTO W/SCOPE: CPT | Performed by: FAMILY MEDICINE

## 2018-08-28 RX ORDER — CEFUROXIME AXETIL 250 MG/1
250 TABLET ORAL 2 TIMES DAILY
Qty: 20 TABLET | Refills: 0 | Status: SHIPPED | OUTPATIENT
Start: 2018-08-28 | End: 2019-02-19

## 2018-08-28 RX ORDER — PHENAZOPYRIDINE HYDROCHLORIDE 200 MG/1
200 TABLET, FILM COATED ORAL 3 TIMES DAILY PRN
Qty: 6 TABLET | Refills: 0 | Status: SHIPPED | OUTPATIENT
Start: 2018-08-28 | End: 2019-02-19

## 2018-08-28 NOTE — PROGRESS NOTES
SUBJECTIVE: Julia Smith is a 33 year old female who complains of urinary frequency, urgency and dysuria x several days, without flank pain, fever, chills, or abnormal vaginal discharge or bleeding.     OBJECTIVE: Appears well, in no apparent distress.  Vital signs are normal. The abdomen is soft without tenderness, guarding, mass, rebound or organomegaly. No CVA tenderness or inguinal adenopathy noted.   Results for orders placed or performed in visit on 08/28/18   UA with Microscopic reflex to Culture   Result Value Ref Range    Color Urine Yellow     Appearance Urine Clear     Glucose Urine Negative NEG^Negative mg/dL    Bilirubin Urine Small (A) NEG^Negative    Ketones Urine Trace (A) NEG^Negative mg/dL    Specific Gravity Urine 1.025 1.003 - 1.035    pH Urine 5.5 5.0 - 7.0 pH    Protein Albumin Urine Trace (A) NEG^Negative mg/dL    Urobilinogen Urine 1.0 0.2 - 1.0 EU/dL    Nitrite Urine Positive (A) NEG^Negative    Blood Urine Trace (A) NEG^Negative    Leukocyte Esterase Urine Negative NEG^Negative    Source Midstream Urine     WBC Urine 5-10 (A) OTO5^0 - 5 /HPF    RBC Urine 5-10 (A) OTO2^O - 2 /HPF    Hyaline Casts O - 2 OTO2^O - 2 /LPF    Squamous Epithelial /LPF Urine Many (A) FEW^Few /LPF    Bacteria Urine Few (A) NEG^Negative /HPF    Mucous Urine Present (A) NEG^Negative /LPF         ASSESSMENT: UTI uncomplicated without evidence of pyelonephritis    PLAN: Treatment per orders - also push fluids, may use Pyridium OTC prn. Call or return to clinic prn if these symptoms worsen or fail to improve as anticipated.

## 2018-08-28 NOTE — TELEPHONE ENCOUNTER
URINARY TRACT SYMPTOMS  Onset: 8/26/18    Description:   Painful urination (Dysuria): YES  Blood in urine (Hematuria): no   Delay in urine (Hesitency): YES    Intensity: moderate    Progression of Symptoms:  same    Accompanying Signs & Symptoms:  Fever/chills: no   Flank pain YES  Nausea and vomiting: no   Any vaginal symptoms: none  Abdominal/Pelvic Pain: YES    History:   History of frequent UTI's: no   History of kidney stones: no   Sexually Active: YES  Possibility of pregnancy: No    Precipitating factors:   none    Therapies Tried and outcome:  Increase fluid intake and OTC advil or tylenol did not help.      States back hurts really bad.  Has constant feeling of having to void.  When does go sometimes is a full stream and other times is drops of urine.    Recommend UC tonight for evaluation due to no openings in clinic.    Henrietta Harrell RN  EP Triage

## 2018-08-28 NOTE — MR AVS SNAPSHOT
After Visit Summary   8/28/2018    Julia Smith    MRN: [id number]           Patient Information     Date Of Birth          1985        Visit Information        Provider Department      8/28/2018 5:55 PM Messi Schaefer MD [facility]        Today's Diagnoses     Dysuria    -  1    Nonspecific finding on examination of urine           Follow-ups after your visit        Who to contact     If you have questions or need follow up information about today's clinic visit or your schedule please contact [facility] directly at [phone].  Normal or non-critical lab and imaging results will be communicated to you by [employer], letter or phone within 4 business days after the clinic has received the results. If you do not hear from us within 7 days, please contact the clinic through K[employer] or phone. If you have a critical or abnormal lab result, we will notify you by phone as soon as possible.  Submit refill requests through [employer] or call your pharmacy and they will forward the refill request to us. Please allow 3 business days for your refill to be completed.          Additional Information About Your Visit        MyChart Information     [employer] gives you secure access to your electronic health record. If you see a primary care provider, you can also send messages to your care team and make appointments. If you have questions, please call your primary care clinic.  If you do not have a primary care provider, please call [phone] and they will assist you.        Care EveryWhere ID     This is your Care EveryWhere ID. This could be used by other organizations to access your [facility] medical records  [id number]        Your Vitals Were     Pulse Temperature Respirations Last Period Pulse Oximetry BMI (Body Mass Index)    114 99.5  F (37.5  C) (Oral) 16 10/23/2014 (LMP Unknown) 97% 23.91 kg/m2       Blood Pressure from Last 3 Encounters:    08/28/18 100/60   05/10/18 93/59   03/18/18 115/83    Weight from Last 3 Encounters:   08/28/18 139 lb 4.8 oz (63.2 kg)   05/09/18 143 lb (64.9 kg)   03/18/18 145 lb (65.8 kg)              We Performed the Following     UA with Microscopic reflex to Culture     Urine Culture Aerobic Bacterial          Today's Medication Changes          These changes are accurate as of 8/28/18  6:57 PM.  If you have any questions, ask your nurse or doctor.               Start taking these medicines.        Dose/Directions    cefuroxime 250 MG tablet   Commonly known as:  CEFTIN   Used for:  Dysuria   Started by:  Messi Schaefer MD        Dose:  250 mg   Take 1 tablet (250 mg) by mouth 2 times daily   Quantity:  20 tablet   Refills:  0            Where to get your medicines      These medications were sent to [address] [address]     Phone:  [phone]     cefuroxime 250 MG tablet                Primary Care Provider Office Phone # Fax #    Jillian Palma [phone]       [address] [place] [address]        Equal Access to Services     CHICHI HUGHES AH: Hadii ty ku hadasho Soomaali, waaxda luqadaha, qaybta kaalmada adeegyada, waxay shanique hayparris thomas. So [facility] [phone].    ATENCIÓN: Si habla español, tiene a smith disposición servicios gratuitos de asistencia lingüística. Llame al [phone].    We comply with applicable federal civil rights laws and [place] laws. We do not discriminate on the basis of race, color, national origin, age, disability, sex, sexual orientation, or gender identity.            Thank you!     Thank you for choosing [facility]  for your care. Our goal is always to provide you with excellent care. Hearing back from our patients is one way we can continue to improve our services. Please take a few minutes to complete the written  survey that you may receive in the mail after your visit with us. Thank you!             Your Updated Medication List - Protect others around you: Learn how to safely use, store and throw away your medicines at www.disposemymeds.org.          This list is accurate as of 8/28/18  6:57 PM.  Always use your most recent med list.                   Brand Name Dispense Instructions for use Diagnosis    acetaminophen 325 MG tablet    TYLENOL    100 tablet    Take 2 tablets (650 mg) by mouth every 4 hours as needed for other (mild pain)    Acute post-operative pain       ADDERALL XR PO      Take 10 mg by mouth every morning (patient takes 2 X 5 mg = 10 mg dose)        AMITRIPTYLINE HCL PO      Take 25 mg by mouth At Bedtime        cefuroxime 250 MG tablet    CEFTIN    20 tablet    Take 1 tablet (250 mg) by mouth 2 times daily    Dysuria       CLONAZEPAM PO      Take 1 mg by mouth 2 times daily as needed for anxiety        ESTRADIOL PO      Take 1 mg by mouth every morning        FLONASE ALLERGY RELIEF NA      Spray 1 spray in nostril daily as needed        GABAPENTIN PO      Take 400 mg by mouth 3 times daily        hydrOXYzine 25 MG tablet    ATARAX    30 tablet    Take 1 tablet (25 mg) by mouth every 6 hours as needed for itching (and nausea)    Acute post-operative pain       LAMOTRIGINE PO      Take 100 mg by mouth 2 times daily        MIRALAX Packet   Generic drug:  polyethylene glycol      Take 17 g by mouth daily as needed for constipation        nicotine 10 MG Inhaler    NICOTROL     Inhale 6-16 Cartridges into the lungs daily as needed for smoking cessation        oxyCODONE IR 5 MG tablet    ROXICODONE    40 tablet    Take 1-2 tablets (5-10 mg) by mouth every 3 hours as needed for pain or other (Moderate to Severe)    Acute post-operative pain       promethazine 25 MG tablet    PHENERGAN    15 tablet    Take 1 tablet (25 mg) by mouth every 6 hours as needed for nausea    Nausea and vomiting, intractability of  vomiting not specified, unspecified vomiting type       SERTRALINE HCL PO      Take 150 mg by mouth every morning (patient takes 1.5 X 100 mg = 150 mg dose)        VALIUM PO      Place 10 mg vaginally daily as needed

## 2018-08-30 LAB
BACTERIA SPEC CULT: NORMAL
SPECIMEN SOURCE: NORMAL

## 2018-08-31 ENCOUNTER — APPOINTMENT (OUTPATIENT)
Dept: CT IMAGING | Facility: CLINIC | Age: 33
End: 2018-08-31
Attending: EMERGENCY MEDICINE
Payer: COMMERCIAL

## 2018-08-31 ENCOUNTER — HOSPITAL ENCOUNTER (EMERGENCY)
Facility: CLINIC | Age: 33
Discharge: HOME | End: 2018-08-31
Attending: EMERGENCY MEDICINE | Admitting: EMERGENCY MEDICINE
Payer: COMMERCIAL

## 2018-08-31 VITALS
BODY MASS INDEX: 23.39 KG/M2 | SYSTOLIC BLOOD PRESSURE: 112 MMHG | WEIGHT: 137 LBS | HEART RATE: 119 BPM | HEIGHT: 64 IN | RESPIRATION RATE: 18 BRPM | DIASTOLIC BLOOD PRESSURE: 76 MMHG | OXYGEN SATURATION: 94 % | TEMPERATURE: 97.9 F

## 2018-08-31 DIAGNOSIS — N20.1 URETEROLITHIASIS: ICD-10-CM

## 2018-08-31 LAB
ALBUMIN UR-MCNC: 30 MG/DL
ALBUMIN UR-MCNC: 30 MG/DL
ANION GAP SERPL CALCULATED.3IONS-SCNC: 7 MMOL/L (ref 3–14)
APPEARANCE UR: ABNORMAL
APPEARANCE UR: CLEAR
BASOPHILS # BLD AUTO: 0 10E9/L (ref 0–0.2)
BASOPHILS NFR BLD AUTO: 0.3 %
BILIRUB UR QL STRIP: ABNORMAL
BILIRUB UR QL STRIP: ABNORMAL
BUN SERPL-MCNC: 9 MG/DL (ref 7–30)
CALCIUM SERPL-MCNC: 8.8 MG/DL (ref 8.5–10.1)
CHLORIDE SERPL-SCNC: 106 MMOL/L (ref 94–109)
CO2 SERPL-SCNC: 26 MMOL/L (ref 20–32)
COLOR UR AUTO: ABNORMAL
COLOR UR AUTO: ABNORMAL
CREAT SERPL-MCNC: 0.79 MG/DL (ref 0.52–1.04)
DIFFERENTIAL METHOD BLD: NORMAL
EOSINOPHIL # BLD AUTO: 0.1 10E9/L (ref 0–0.7)
EOSINOPHIL NFR BLD AUTO: 1.1 %
ERYTHROCYTE [DISTWIDTH] IN BLOOD BY AUTOMATED COUNT: 14.4 % (ref 10–15)
GFR SERPL CREATININE-BSD FRML MDRD: 84 ML/MIN/1.7M2
GLUCOSE SERPL-MCNC: 86 MG/DL (ref 70–99)
GLUCOSE UR STRIP-MCNC: NEGATIVE MG/DL
GLUCOSE UR STRIP-MCNC: NEGATIVE MG/DL
HCT VFR BLD AUTO: 39 % (ref 35–47)
HGB BLD-MCNC: 13.5 G/DL (ref 11.7–15.7)
HGB UR QL STRIP: NEGATIVE
HGB UR QL STRIP: NEGATIVE
IMM GRANULOCYTES # BLD: 0 10E9/L (ref 0–0.4)
IMM GRANULOCYTES NFR BLD: 0.2 %
KETONES UR STRIP-MCNC: NEGATIVE MG/DL
KETONES UR STRIP-MCNC: NEGATIVE MG/DL
LEUKOCYTE ESTERASE UR QL STRIP: ABNORMAL
LEUKOCYTE ESTERASE UR QL STRIP: NEGATIVE
LYMPHOCYTES # BLD AUTO: 3.5 10E9/L (ref 0.8–5.3)
LYMPHOCYTES NFR BLD AUTO: 37 %
MCH RBC QN AUTO: 29.9 PG (ref 26.5–33)
MCHC RBC AUTO-ENTMCNC: 34.6 G/DL (ref 31.5–36.5)
MCV RBC AUTO: 86 FL (ref 78–100)
MONOCYTES # BLD AUTO: 0.6 10E9/L (ref 0–1.3)
MONOCYTES NFR BLD AUTO: 5.8 %
MUCOUS THREADS #/AREA URNS LPF: PRESENT /LPF
MUCOUS THREADS #/AREA URNS LPF: PRESENT /LPF
NEUTROPHILS # BLD AUTO: 5.3 10E9/L (ref 1.6–8.3)
NEUTROPHILS NFR BLD AUTO: 55.6 %
NITRATE UR QL: POSITIVE
NITRATE UR QL: POSITIVE
NRBC # BLD AUTO: 0 10*3/UL
NRBC BLD AUTO-RTO: 0 /100
PH UR STRIP: 5.5 PH (ref 5–7)
PH UR STRIP: 6 PH (ref 5–7)
PLATELET # BLD AUTO: 308 10E9/L (ref 150–450)
POTASSIUM SERPL-SCNC: 4 MMOL/L (ref 3.4–5.3)
RBC # BLD AUTO: 4.52 10E12/L (ref 3.8–5.2)
RBC #/AREA URNS AUTO: 11 /HPF (ref 0–2)
RBC #/AREA URNS AUTO: 4 /HPF (ref 0–2)
SODIUM SERPL-SCNC: 139 MMOL/L (ref 133–144)
SOURCE: ABNORMAL
SOURCE: ABNORMAL
SP GR UR STRIP: 1.02 (ref 1–1.03)
SP GR UR STRIP: 1.02 (ref 1–1.03)
SQUAMOUS #/AREA URNS AUTO: 23 /HPF (ref 0–1)
SQUAMOUS #/AREA URNS AUTO: <1 /HPF (ref 0–1)
UROBILINOGEN UR STRIP-MCNC: 4 MG/DL (ref 0–2)
UROBILINOGEN UR STRIP-MCNC: 8 MG/DL (ref 0–2)
WBC # BLD AUTO: 9.4 10E9/L (ref 4–11)
WBC #/AREA URNS AUTO: 12 /HPF (ref 0–5)
WBC #/AREA URNS AUTO: 40 /HPF (ref 0–5)

## 2018-08-31 PROCEDURE — 80048 BASIC METABOLIC PNL TOTAL CA: CPT | Performed by: EMERGENCY MEDICINE

## 2018-08-31 PROCEDURE — 96376 TX/PRO/DX INJ SAME DRUG ADON: CPT

## 2018-08-31 PROCEDURE — 25000132 ZZH RX MED GY IP 250 OP 250 PS 637: Performed by: EMERGENCY MEDICINE

## 2018-08-31 PROCEDURE — 99285 EMERGENCY DEPT VISIT HI MDM: CPT | Mod: 25

## 2018-08-31 PROCEDURE — 96375 TX/PRO/DX INJ NEW DRUG ADDON: CPT

## 2018-08-31 PROCEDURE — 87086 URINE CULTURE/COLONY COUNT: CPT | Performed by: EMERGENCY MEDICINE

## 2018-08-31 PROCEDURE — 87106 FUNGI IDENTIFICATION YEAST: CPT | Performed by: EMERGENCY MEDICINE

## 2018-08-31 PROCEDURE — 81001 URINALYSIS AUTO W/SCOPE: CPT | Performed by: EMERGENCY MEDICINE

## 2018-08-31 PROCEDURE — 85025 COMPLETE CBC W/AUTO DIFF WBC: CPT | Performed by: EMERGENCY MEDICINE

## 2018-08-31 PROCEDURE — 25000125 ZZHC RX 250: Performed by: EMERGENCY MEDICINE

## 2018-08-31 PROCEDURE — 25000128 H RX IP 250 OP 636: Performed by: EMERGENCY MEDICINE

## 2018-08-31 PROCEDURE — 74177 CT ABD & PELVIS W/CONTRAST: CPT

## 2018-08-31 PROCEDURE — 96374 THER/PROPH/DIAG INJ IV PUSH: CPT | Mod: 59

## 2018-08-31 PROCEDURE — 96361 HYDRATE IV INFUSION ADD-ON: CPT

## 2018-08-31 RX ORDER — KETOROLAC TROMETHAMINE 15 MG/ML
15 INJECTION, SOLUTION INTRAMUSCULAR; INTRAVENOUS ONCE
Status: COMPLETED | OUTPATIENT
Start: 2018-08-31 | End: 2018-08-31

## 2018-08-31 RX ORDER — ONDANSETRON 4 MG/1
4 TABLET, ORALLY DISINTEGRATING ORAL EVERY 8 HOURS PRN
Qty: 10 TABLET | Refills: 0 | Status: SHIPPED | OUTPATIENT
Start: 2018-08-31 | End: 2019-02-19

## 2018-08-31 RX ORDER — IOPAMIDOL 755 MG/ML
69 INJECTION, SOLUTION INTRAVASCULAR ONCE
Status: COMPLETED | OUTPATIENT
Start: 2018-08-31 | End: 2018-08-31

## 2018-08-31 RX ORDER — HYDROCODONE BITARTRATE AND ACETAMINOPHEN 5; 325 MG/1; MG/1
1 TABLET ORAL EVERY 6 HOURS PRN
Qty: 12 TABLET | Refills: 0 | Status: SHIPPED | OUTPATIENT
Start: 2018-08-31 | End: 2018-09-03

## 2018-08-31 RX ORDER — MORPHINE SULFATE 2 MG/ML
2 INJECTION, SOLUTION INTRAMUSCULAR; INTRAVENOUS ONCE
Status: COMPLETED | OUTPATIENT
Start: 2018-08-31 | End: 2018-08-31

## 2018-08-31 RX ORDER — ONDANSETRON 2 MG/ML
4 INJECTION INTRAMUSCULAR; INTRAVENOUS ONCE
Status: COMPLETED | OUTPATIENT
Start: 2018-08-31 | End: 2018-08-31

## 2018-08-31 RX ORDER — TAMSULOSIN HYDROCHLORIDE 0.4 MG/1
0.4 CAPSULE ORAL DAILY
Qty: 7 CAPSULE | Refills: 0 | Status: SHIPPED | OUTPATIENT
Start: 2018-08-31 | End: 2018-09-07

## 2018-08-31 RX ORDER — MORPHINE SULFATE 4 MG/ML
4 INJECTION, SOLUTION INTRAMUSCULAR; INTRAVENOUS ONCE
Status: COMPLETED | OUTPATIENT
Start: 2018-08-31 | End: 2018-08-31

## 2018-08-31 RX ORDER — HYDROCODONE BITARTRATE AND ACETAMINOPHEN 5; 325 MG/1; MG/1
2 TABLET ORAL ONCE
Status: COMPLETED | OUTPATIENT
Start: 2018-08-31 | End: 2018-08-31

## 2018-08-31 RX ADMIN — MORPHINE SULFATE 2 MG: 2 INJECTION, SOLUTION INTRAMUSCULAR; INTRAVENOUS at 22:42

## 2018-08-31 RX ADMIN — SODIUM CHLORIDE, PRESERVATIVE FREE 61 ML: 5 INJECTION INTRAVENOUS at 21:01

## 2018-08-31 RX ADMIN — SODIUM CHLORIDE 1000 ML: 9 INJECTION, SOLUTION INTRAVENOUS at 20:35

## 2018-08-31 RX ADMIN — IOPAMIDOL 69 ML: 755 INJECTION, SOLUTION INTRAVENOUS at 21:01

## 2018-08-31 RX ADMIN — KETOROLAC TROMETHAMINE 15 MG: 15 INJECTION, SOLUTION INTRAMUSCULAR; INTRAVENOUS at 20:35

## 2018-08-31 RX ADMIN — ONDANSETRON 4 MG: 2 INJECTION INTRAMUSCULAR; INTRAVENOUS at 20:35

## 2018-08-31 RX ADMIN — HYDROCODONE BITARTRATE AND ACETAMINOPHEN 2 TABLET: 5; 325 TABLET ORAL at 20:04

## 2018-08-31 RX ADMIN — MORPHINE SULFATE 4 MG: 4 INJECTION INTRAVENOUS at 21:36

## 2018-08-31 ASSESSMENT — ENCOUNTER SYMPTOMS
ABDOMINAL PAIN: 1
FLANK PAIN: 1
VOMITING: 1
HEMATURIA: 0
DIARRHEA: 1
NAUSEA: 1

## 2018-08-31 NOTE — ED AVS SNAPSHOT
Emergency Department    [address] [facility] [address]    Phone:  [phone]    Fax:  [phone]                                       Julia Smith   MRN: [id number]    Department:   Emergency Department   Date of Visit:  8/31/2018           Patient Information     Date Of Birth          1985        Your diagnoses for this visit were:     Ureterolithiasis        You were seen by Joy Carmen MD.      Follow-up Information     Follow up with Ángel Nix MD In 4 days.    Specialty:  Urology    Contact information:    [facility]  [address] [address] [phone]          Follow up with Jillian Palma MD.    Specialty:  Internal Medicine    Why:  As needed    Contact information:    [address] [place] [address] [phone]          Follow up with  Emergency Department.    Specialty:  EMERGENCY MEDICINE    Why:  If symptoms worsen    Contact information:    [address] [facility] [address] [phone]        Discharge Instructions       Your kidney stone will likely pass on its own. To help, take Flomax until it has passed. Strain your urine to look for stone.  Use naproxen for mild to moderate pain and Norco for severe pain.  Use Zofran as needed for nausea or vomiting.  You can stop Pyridium, but continue antibiotics prescribed by Urgent Care.  Follow up with urology.  Return to the ER with uncontrolled pain, fever >100.4F, decreased urine output, or any other new or concerning symptoms.       Discharge References/Attachments     KIDNEY STONES, TREATING: EXPECTANT THERAPY (ENGLISH)      24 Hour Appointment Hotline       To make an appointment at any [facility], call [phone] ([phone]). If you don't have a family doctor or clinic, we will help you find one. [facility] clinics are conveniently located to serve the needs of you and your family.             Review of  your medicines      START taking        Dose / Directions Last dose taken    HYDROcodone-acetaminophen 5-325 MG per tablet   Commonly known as:  NORCO   Dose:  1 tablet   Quantity:  12 tablet        Take 1 tablet by mouth every 6 hours as needed for pain   Refills:  0        ondansetron 4 MG ODT tab   Commonly known as:  ZOFRAN ODT   Dose:  4 mg   Quantity:  10 tablet        Take 1 tablet (4 mg) by mouth every 8 hours as needed for nausea   Refills:  0        tamsulosin 0.4 MG capsule   Commonly known as:  FLOMAX   Dose:  0.4 mg   Quantity:  7 capsule        Take 1 capsule (0.4 mg) by mouth daily for 7 days   Refills:  0          Our records show that you are taking the medicines listed below. If these are incorrect, please call your family doctor or clinic.        Dose / Directions Last dose taken    acetaminophen 325 MG tablet   Commonly known as:  TYLENOL   Dose:  650 mg   Quantity:  100 tablet        Take 2 tablets (650 mg) by mouth every 4 hours as needed for other (mild pain)   Refills:  0        ADDERALL XR PO   Dose:  10 mg        Take 10 mg by mouth every morning (patient takes 2 X 5 mg = 10 mg dose)   Refills:  0        AMITRIPTYLINE HCL PO   Dose:  25 mg        Take 25 mg by mouth At Bedtime   Refills:  0        cefuroxime 250 MG tablet   Commonly known as:  CEFTIN   Dose:  250 mg   Quantity:  20 tablet        Take 1 tablet (250 mg) by mouth 2 times daily   Refills:  0        CLONAZEPAM PO   Dose:  1 mg        Take 1 mg by mouth 2 times daily as needed for anxiety   Refills:  0        ESTRADIOL PO   Dose:  1 mg        Take 1 mg by mouth every morning   Refills:  0        FLONASE ALLERGY RELIEF NA   Dose:  1 spray        Spray 1 spray in nostril daily as needed   Refills:  0        GABAPENTIN PO   Dose:  400 mg        Take 400 mg by mouth 3 times daily   Refills:  0        hydrOXYzine 25 MG tablet   Commonly known as:  ATARAX   Dose:  25 mg   Quantity:  30 tablet        Take 1 tablet (25 mg) by mouth every  6 hours as needed for itching (and nausea)   Refills:  0        LAMOTRIGINE PO   Dose:  100 mg        Take 100 mg by mouth 2 times daily   Refills:  0        MIRALAX Packet   Dose:  17 g   Generic drug:  polyethylene glycol        Take 17 g by mouth daily as needed for constipation   Refills:  0        nicotine 10 MG Inhaler   Commonly known as:  NICOTROL   Dose:  6-16 Cartridge        Inhale 6-16 Cartridges into the lungs daily as needed for smoking cessation   Refills:  0        oxyCODONE IR 5 MG tablet   Commonly known as:  ROXICODONE   Dose:  5-10 mg   Quantity:  40 tablet        Take 1-2 tablets (5-10 mg) by mouth every 3 hours as needed for pain or other (Moderate to Severe)   Refills:  0        phenazopyridine 200 MG tablet   Commonly known as:  PYRIDIUM   Dose:  200 mg   Quantity:  6 tablet        Take 1 tablet (200 mg) by mouth 3 times daily as needed for irritation   Refills:  0        promethazine 25 MG tablet   Commonly known as:  PHENERGAN   Dose:  25 mg   Quantity:  15 tablet        Take 1 tablet (25 mg) by mouth every 6 hours as needed for nausea   Refills:  0        SERTRALINE HCL PO   Dose:  150 mg        Take 150 mg by mouth every morning (patient takes 1.5 X 100 mg = 150 mg dose)   Refills:  0        VALIUM PO   Dose:  10 mg        Place 10 mg vaginally daily as needed   Refills:  0                Information about OPIOIDS     PRESCRIPTION OPIOIDS: WHAT YOU NEED TO KNOW   We gave you an opioid (narcotic) pain medicine. It is important to manage your pain, but opioids are not always the best choice. You should first try all the other options your care team gave you. Take this medicine for as short a time (and as few doses) as possible.    Some activities can increase your pain, such as bandage changes or therapy sessions. It may help to take your pain medicine 30 to 60 minutes before these activities. Reduce your stress by getting enough sleep, working on hobbies you enjoy and practicing relaxation  or meditation. Talk to your care team about ways to manage your pain beyond prescription opioids.    These medicines have risks:    DO NOT drive when on new or higher doses of pain medicine. These medicines can affect your alertness and reaction times, and you could be arrested for driving under the influence (DUI). If you need to use opioids long-term, talk to your care team about driving.    DO NOT operate heavy machinery    DO NOT do any other dangerous activities while taking these medicines.    DO NOT drink any alcohol while taking these medicines.     If the opioid prescribed includes acetaminophen, DO NOT take with any other medicines that contain acetaminophen. Read all labels carefully. Look for the word  acetaminophen  or  Tylenol.  Ask your pharmacist if you have questions or are unsure.    You can get addicted to pain medicines, especially if you have a history of addiction (chemical, alcohol or substance dependence). Talk to your care team about ways to reduce this risk.    All opioids tend to cause constipation. Drink plenty of water and eat foods that have a lot of fiber, such as fruits, vegetables, prune juice, apple juice and high-fiber cereal. Take a laxative (Miralax, milk of magnesia, Colace, Senna) if you don t move your bowels at least every other day. Other side effects include upset stomach, sleepiness, dizziness, throwing up, tolerance (needing more of the medicine to have the same effect), physical dependence and slowed breathing.    Store your pills in a secure place, locked if possible. We will not replace any lost or stolen medicine. If you don t finish your medicine, please throw away (dispose) as directed by your pharmacist. The [place] [employer] has more information about safe disposal: [email or web address]://[email or web address].[email or web address]/living-green/[email or web address]-medications        Prescriptions were sent or printed at these locations (3 Prescriptions)                   Other  Prescriptions                Printed at Department/Unit printer (3 of 3)         HYDROcodone-acetaminophen (NORCO) 5-325 MG per tablet               ondansetron (ZOFRAN ODT) 4 MG ODT tab               tamsulosin (FLOMAX) 0.4 MG capsule                Procedures and tests performed during your visit     Procedure/Test Number of Times Performed    Basic metabolic panel 1    CBC with platelets differential 1    CT Abdomen Pelvis w Contrast 1    Peripheral IV catheter 1    Pulse oximetry nursing 1    UA with Microscopic 2    Urine Culture 1      Orders Needing Specimen Collection     None      Pending Results     Date and Time Order Name Status Description    8/31/2018 2026 Urine Culture Preliminary             Pending Culture Results     Date and Time Order Name Status Description    8/31/2018 2026 Urine Culture Preliminary             Pending Results Instructions     If you had any lab results that were not finalized at the time of your Discharge, you can call the ED Lab Result RN at [phone]. You will be contacted by this team for any positive Lab results or changes in treatment. The nurses are available 7 days a week from 10A to 6:30P.  You can leave a message 24 hours per day and they will return your call.        Test Results From Your Hospital Stay        8/31/2018  5:31 PM      Component Results     Component Value Ref Range & Units Status    WBC 9.4 4.0 - 11.0 10e9/L Final    RBC Count 4.52 3.8 - 5.2 10e12/L Final    Hemoglobin 13.5 11.7 - 15.7 g/dL Final    Hematocrit 39.0 35.0 - 47.0 % Final    MCV 86 78 - 100 fl Final    MCH 29.9 26.5 - 33.0 pg Final    MCHC 34.6 31.5 - 36.5 g/dL Final    RDW 14.4 10.0 - 15.0 % Final    Platelet Count 308 150 - 450 10e9/L Final    Diff Method Automated Method  Final    % Neutrophils 55.6 % Final    % Lymphocytes 37.0 % Final    % Monocytes 5.8 % Final    % Eosinophils 1.1 % Final    % Basophils 0.3 % Final    % Immature Granulocytes 0.2 % Final    Nucleated RBCs 0 0 /100  Final    Absolute Neutrophil 5.3 1.6 - 8.3 10e9/L Final    Absolute Lymphocytes 3.5 0.8 - 5.3 10e9/L Final    Absolute Monocytes 0.6 0.0 - 1.3 10e9/L Final    Absolute Eosinophils 0.1 0.0 - 0.7 10e9/L Final    Absolute Basophils 0.0 0.0 - 0.2 10e9/L Final    Abs Immature Granulocytes 0.0 0 - 0.4 10e9/L Final    Absolute Nucleated RBC 0.0  Final         8/31/2018  5:48 PM      Component Results     Component Value Ref Range & Units Status    Sodium 139 133 - 144 mmol/L Final    Potassium 4.0 3.4 - 5.3 mmol/L Final    Chloride 106 94 - 109 mmol/L Final    Carbon Dioxide 26 20 - 32 mmol/L Final    Anion Gap 7 3 - 14 mmol/L Final    Glucose 86 70 - 99 mg/dL Final    Urea Nitrogen 9 7 - 30 mg/dL Final    Creatinine 0.79 0.52 - 1.04 mg/dL Final    GFR Estimate 84 >60 mL/min/1.7m2 Final    Non  GFR Calc    GFR Estimate If Black >90 >60 mL/min/1.7m2 Final    African American GFR Calc    Calcium 8.8 8.5 - 10.1 mg/dL Final         8/31/2018  6:06 PM      Component Results     Component Value Ref Range & Units Status    Color Urine Dark Brown  Final    Appearance Urine Slightly Cloudy  Final    Glucose Urine Negative NEG^Negative mg/dL Final    Bilirubin Urine Moderate (A) NEG^Negative Final    This is an unconfirmed screening test result. A positive result may be false.    Ketones Urine Negative NEG^Negative mg/dL Final    Specific Gravity Urine 1.018 1.003 - 1.035 Final    Blood Urine Negative NEG^Negative Final    pH Urine 6.0 5.0 - 7.0 pH Final    Protein Albumin Urine 30 (A) NEG^Negative mg/dL Final    Urobilinogen mg/dL 4.0 (H) 0.0 - 2.0 mg/dL Final    Nitrite Urine Positive (A) NEG^Negative Final    Leukocyte Esterase Urine Moderate (A) NEG^Negative Final    Source Midstream Urine  Final    WBC Urine 40 (H) 0 - 5 /HPF Final    RBC Urine 11 (H) 0 - 2 /HPF Final    Squamous Epithelial /HPF Urine 23 (H) 0 - 1 /HPF Final    Mucous Urine Present (A) NEG^Negative /LPF Final         8/31/2018 10:35 PM       Component Results     Component    Specimen Description    Catheterized Urine    Special Requests    Specimen received in preservative    Culture Micro    PENDING         8/31/2018  9:30 PM      Narrative     CT ABDOMEN PELVIS WITH CONTRAST   8/31/2018 9:04 PM     HISTORY: Left flank and left lower quadrant pain, being treated for  UTI. Question pyelonephritis.     TECHNIQUE: CT abdomen and pelvis with 69 mL Isovue-370 IV. Radiation  dose for this scan was reduced using automated exposure control,  adjustment of the mA and/or kV according to patient size, or iterative  reconstruction technique.    COMPARISON: CT abdomen and pelvis 9/4/2017.    FINDINGS: Distal left ureter stone measures 0.2 cm series 3 image 75.  Mild left hydronephrosis. No areas of hypoenhancement of the renal  parenchyma. No renal parenchymal focal fluid collection. No right  hydronephrosis. Stable small hypodensity that may be an ill-defined  cyst lower left kidney series 3 image 33.    No acute bowel abnormality. Appendix not able to be identified. No  bowel obstruction.    The liver, gallbladder, adrenals, spleen, and pancreas do not show any  acute abnormalities.         Impression     IMPRESSION:  1. Distal left ureter stone is 0.2 cm. Mild left hydronephrosis.  2. No imaging evidence for pyelonephritis, but early pyelonephritis  may not be fully characterized by CT.    RED FLOWER MD         8/31/2018 10:02 PM      Component Results     Component Value Ref Range & Units Status    Color Urine Dark Brown  Final    Appearance Urine Clear  Final    Glucose Urine Negative NEG^Negative mg/dL Final    Bilirubin Urine Moderate (A) NEG^Negative Final    This is an unconfirmed screening test result. A positive result may be false.    Ketones Urine Negative NEG^Negative mg/dL Final    Specific Gravity Urine 1.017 1.003 - 1.035 Final    Blood Urine Negative NEG^Negative Final    pH Urine 5.5 5.0 - 7.0 pH Final    Protein Albumin Urine 30 (A)  NEG^Negative mg/dL Final    Urobilinogen mg/dL 8.0 (H) 0.0 - 2.0 mg/dL Final    Nitrite Urine Positive (A) NEG^Negative Final    Leukocyte Esterase Urine Negative NEG^Negative Final    Source Catheterized Urine  Final    WBC Urine 12 (H) 0 - 5 /HPF Final    RBC Urine 4 (H) 0 - 2 /HPF Final    Squamous Epithelial /HPF Urine <1 0 - 1 /HPF Final    Mucous Urine Present (A) NEG^Negative /LPF Final                Clinical Quality Measure: Blood Pressure Screening     Your blood pressure was checked while you were in the emergency department today. The last reading we obtained was  BP: 109/75 . Please read the guidelines below about what these numbers mean and what you should do about them.  If your systolic blood pressure (the top number) is less than 120 and your diastolic blood pressure (the bottom number) is less than 80, then your blood pressure is normal. There is nothing more that you need to do about it.  If your systolic blood pressure (the top number) is 120-139 or your diastolic blood pressure (the bottom number) is 80-89, your blood pressure may be higher than it should be. You should have your blood pressure rechecked within a year by a primary care provider.  If your systolic blood pressure (the top number) is 140 or greater or your diastolic blood pressure (the bottom number) is 90 or greater, you may have high blood pressure. High blood pressure is treatable, but if left untreated over time it can put you at risk for heart attack, stroke, or kidney failure. You should have your blood pressure rechecked by a primary care provider within the next 4 weeks.  If your provider in the emergency department today gave you specific instructions to follow-up with your doctor or provider even sooner than that, you should follow that instruction and not wait for up to 4 weeks for your follow-up visit.        Thank you for choosing [facility]       Thank you for choosing [facility] for your care. Our goal is always to  provide you with excellent care. Hearing back from our patients is one way we can continue to improve our services. Please take a few minutes to complete the written survey that you may receive in the mail after you visit with us. Thank you!        [facility] Information     [facility] gives you secure access to your electronic health record. If you see a primary care provider, you can also send messages to your care team and make appointments. If you have questions, please call your primary care clinic.  If you do not have a primary care provider, please call [phone] and they will assist you.        Care EveryWhere ID     This is your Care EveryWhere ID. This could be used by other organizations to access your [facility] medical records  [id number]        Equal Access to Services     CHICHI HUGHES : Marsha Perrin, john sanders, adam lynch, shimon thomas. So [facility] [phone].    ATENCIÓN: Si habla español, tiene a smith disposición servicios gratuitos de asistencia lingüística. Llame al [phone].    We comply with applicable federal civil rights laws and [place] laws. We do not discriminate on the basis of race, color, national origin, age, disability, sex, sexual orientation, or gender identity.            After Visit Summary       This is your record. Keep this with you and show to your community pharmacist(s) and doctor(s) at your next visit.

## 2018-08-31 NOTE — ED AVS SNAPSHOT
Emergency Department    [address] [place] [address]    Phone:  [phone]    Fax:  [phone]                                       Julia Smith   MRN: [id number]    Department:   Emergency Department   Date of Visit:  8/31/2018           After Visit Summary Signature Page     I have received my discharge instructions, and my questions have been answered. I have discussed any challenges I see with this plan with the nurse or doctor.    ..........................................................................................................................................  Patient/Patient Representative Signature      ..........................................................................................................................................  Patient Representative Print Name and Relationship to Patient    ..................................................               ................................................  Date                                            Time    ..........................................................................................................................................  Reviewed by Signature/Title    ...................................................              ..............................................  Date                                                            Time          22EPIC Rev 08/18

## 2018-09-01 NOTE — ED NOTES
Female mini cath used for sterile UA sample. Patient tolerated well. Urine orange in color. Pt states she is taking Pyridium.   
Pt c/o pain at this time. Tearful. Given ice pack. Explained to her that the provider needs to see her before any pain medications can be administered. Patient verbalized understanding.   
1 pair

## 2018-09-01 NOTE — ED PROVIDER NOTES
"  History     Chief Complaint:  Flank Pain    The history is provided by the patient.      Julia Smith is a 33 year old female with PCOS, chronic pain, fibromyalgia, and endometriosis who presents with left sided flank pain. The patient reports 3 days ago she developed urinary frequency and urgency prompting her to visit Urgent Care. She was diagnosed with UTI (UA below, 5-10 WBC) and started on Ceftin and Pyridium. She has been taking these as prescribed but has developed new pain in the left flank radiating to her left lower quadrant. She states this feels like a pulling pain and is severe in intensity. She has has associated vomiting and diarrhea. She denies dysuria. Reports subjective fever \"here and there.\" She noted no exacerbating factors for her pain and no alleviating factors including no relief with Tylenol or naproxen.      UA from  visit 08/28/2018:    Color Urine Yellow       Appearance Urine Clear       Glucose Urine Negative NEG^Negative mg/dL     Bilirubin Urine Small (A) NEG^Negative     Ketones Urine Trace (A) NEG^Negative mg/dL     Specific Gravity Urine 1.025 1.003 - 1.035     pH Urine 5.5 5.0 - 7.0 pH     Protein Albumin Urine Trace (A) NEG^Negative mg/dL     Urobilinogen Urine 1.0 0.2 - 1.0 EU/dL     Nitrite Urine Positive (A) NEG^Negative     Blood Urine Trace (A) NEG^Negative     Leukocyte Esterase Urine Negative NEG^Negative     Source Midstream Urine       WBC Urine 5-10 (A) OTO5^0 - 5 /HPF     RBC Urine 5-10 (A) OTO2^O - 2 /HPF     Hyaline Casts O - 2 OTO2^O - 2 /LPF     Squamous Epithelial /LPF Urine Many (A) FEW^Few /LPF     Bacteria Urine Few (A) NEG^Negative /HPF     Mucous Urine Present (A) NEG^Negative /LPF       Allergies:  Decadron  Depakote  Ibuprofen  Tramadol  Valproic Acid    Medications:    Amitriptyline  Adderall  Ceftin  Clonazepam  Estradiol  Flonase  Gabapentin  Atarax  Lamotrigine  Nicotrol  Roxicodone  Pyridium  Miralax  Phenergan  Sertraline    Past Medical " "History:    Agoraphobia  Anxiety  Bipolar disorder  Endometriosis  Fibromyalgia  GERD  Migraine  OCD  Other chronic pain  Panic attacks  PCOS  PTSD  TMJ    Past Surgical History:    Davinci assisted ablation/excision of endometriosis  Davinci hysterectomy total  Davinci lysis of adhesions  Davinci oophorectomy  Davinci pelvic procedure  Davinci salpingectomy  Davinci salpingo-oophorectomy LT/RT  Dilation and curettage suction with US guidance  HCL pap smear  Lap appendectomy    Family History:    The patient denies any relevant family medical history.    Social History:  Marital Status:   Presents alone  Smoking Status: Every day  Alcohol Use: No    Review of Systems   Constitutional: Positive for fever (subjective).   Gastrointestinal: Positive for abdominal pain, diarrhea, nausea and vomiting.   Genitourinary: Positive for flank pain, frequency and urgency. Negative for dysuria, hematuria and vaginal discharge.   All other systems reviewed and are negative.        Physical Exam     Patient Vitals for the past 24 hrs:   BP Temp Temp src Pulse Resp SpO2 Height Weight   08/31/18 2246 - 97.9  F (36.6  C) Oral - 18 - - -   08/31/18 2230 112/76 - - - - 94 % - -   08/31/18 2200 109/75 - - - - 93 % - -   08/31/18 2130 105/71 - - - - 95 % - -   08/31/18 2128 - 98.2  F (36.8  C) Oral - 18 - - -   08/31/18 2122 106/71 - - - - - - -   08/31/18 2110 - - - - - 95 % - -   08/31/18 2030 94/66 - - - - 96 % - -   08/31/18 2000 104/73 - - - - 96 % - -   08/31/18 1930 99/74 - - - - 93 % - -   08/31/18 1917 - 98.3  F (36.8  C) Oral - 18 - - -   08/31/18 1914 - - - - - 93 % - -   08/31/18 1913 95/73 - - - - - - -   08/31/18 1703 128/90 99.1  F (37.3  C) Oral 119 20 96 % 1.626 m (5' 4\") 62.1 kg (137 lb)       Physical Exam  General: Well-developed and well-nourished. Uncomfortable appearing young  woman. Cooperative.  Head:  Atraumatic.  Eyes:  Conjunctivae, lids, and sclerae are normal.  ENT:    Normal nose. Moist " mucous membranes.  Neck:  Supple. Normal range of motion.  CV:  Regular rate and rhythm. Normal heart sounds with no murmurs, rubs, or gallops detected.  Resp:  No respiratory distress. Clear to auscultation bilaterally without decreased breath sounds, wheezing, rales, or rhonchi.  GI:  Soft. Non-distended.  Left-sided flank tenderness including CVA tenderness.  Left pelvic tenderness to palpation.   MS:  Normal ROM.   Skin:  Warm. Non-diaphoretic. No pallor.  Neuro:  Awake. A&Ox3. Normal strength.  Psych: Normal mood and affect. Normal speech.  Vitals reviewed.      Emergency Department Course     Imaging:  Radiographic findings were communicated with the patient who voiced understanding of the findings.    CT Abdomen Pelvis w Contrast:  1. Distal left ureter stone is 0.2 cm. Mild left hydronephrosis.  2. No imaging evidence for pyelonephritis, but early pyelonephritis  may not be fully characterized by CT.  Per Radiologist.     Laboratory:  CBC: WNL. (WBC 9.4, HGB 13.5, )   BMP: AW NL. (Creatinine 0.79)  UA: Nitrate Positive, Leukocyte Esterase Moderate, Squamous epithelial/HPF 23 (H), WBC/HPF 40 (H), Mucous Present, RBC/HPF 11 (H), Protein Albumin 30, Urobilinogen 4.0 (H), Bilin Moderate  Urine Culture: Pending    Interventions:  2004: 5-325 mg Norco PO x2  2035: 4 mg Zofran IV  2035: 15 mg Toradol IV  2035: NS 1L IV Bolus  2101: 69 mL Isovue-370 IV  2101: 61 mL Saline Flush IV  2136: 4 mg Morphine IV  2242: 2 mg Morphine IV    Emergency Department Course:  Past medical records, nursing notes, and vitals reviewed.  2018: I performed an exam of the patient and obtained history, as documented above.  2031: Checked , patient's last narcotic prescription was filled 07/27/2018, 15 tablets 5 mg Roxicodone.    IV inserted and blood drawn.  The patient provided a urine sample here in the emergency department. This was sent for laboratory testing, findings above.  The patient was taken for CT abdomen pelvis, see  imaging results above.     Updated patient. Pain improved.     I rechecked the patient. Findings and plan explained to the patient. Patient discharged home with instructions regarding supportive care, medications, and reasons to return. The importance of close follow-up was reviewed.     Impression & Plan      Medical Decision Making:  Julia is a 33-year-old female with chronic pain and multiple visits for pelvic and abdominal pain who presents with left-sided flank pain.  Patient was seen by urgent care 2 days ago for urinary symptoms and started on Ceftin.  However, she has since developed this left-sided flank pain which radiates to her left lower quadrant and has also had vomiting. She has not had improvement with Tylenol or naproxen.  No measured fever.  On exam, patient appears uncomfortable and has tenderness to the left flank including CVA in the left lower quadrant.  Patient has a care plan in place due to her multiple visits and history of chronic pain.  Thus, I initially treated with [place] and Toradol.  She was also given Zofran and IV fluids while undergoing her workup.  Although I would have liked to avoid radiation in this patient, the severity of her pain and worsening of symptoms since a visit to urgent care despite antibiotics did warnt CT of the abdomen and pelvis.  This reveals mild left-sided hydronephrosis as a result of a 2 mm distal left ureteral stone with no evidence of other acute pathologies including no convincing evidence for pyelonephritis.  Patient's initial urinalysis had 40 white blood cells per high-powered field but is a very dirty sample with 23 seconds epithelial cells.  Thus, she was then straight cathed and in order to obtain a second sample.  This reveals only 12 white blood cells per high-powered field and although this is nitrite positive this is probably a false positive secondary to use of Pyridium which is started at urgent care.  While I do not believe she has an  infected stone, she does continue to have pyuria and is already on antibiotics, so I recommended she continue the Ceftin as prescribed.  However, I recommended she discontinue Pyridium as she is not having dysuria.  It is of note that the remainder of her workup is unremarkable with no leukocytosis, normal electrolytes, and a creatinine of 0.79.  When I identified patient's pathology, I felt that in the acute setting as she did warrant IV narcotics.  She was at this given 2 small doses of morphine with appropriate improvement in her pain.  On reevaluation she states she is feeling improved though she continues to endorse 8/10 pain.  I offered her an observation stay for intractable pain though she declines and would prefer to be discharged.  I discussed the results of the laboratory and imaging studies as well as a trial of passage which I suspect will be successful given the very small size of her stone.  I recommended she take Flomax and strain her urine and continue the naproxen as prescribed.  I did provide a prescription for Norco, again, because this is an acute pathology.  She has not had narcotic prescriptions in the last 1 month (last fill only 15 tablets).  She has had no further nausea and vomiting after Zofran provided here and I provided her with a prescription for use at home as well.  I recommended patient follow-up with urology though I have provided return precautions in the interim.  Answered all patient's questions and she verbalized understanding.  Amenable to discharge.      Diagnosis:    ICD-10-CM   1. Ureterolithiasis N20.1       Disposition:  discharged home    Discharge Medications:  Discharge Medication List as of 8/31/2018 10:45 PM      START taking these medications    Details   HYDROcodone-acetaminophen (NORCO) 5-325 MG per tablet Take 1 tablet by mouth every 6 hours as needed for pain, Disp-12 tablet, R-0, Local Print      ondansetron (ZOFRAN ODT) 4 MG ODT tab Take 1 tablet (4 mg) by  mouth every 8 hours as needed for nausea, Disp-10 tablet, R-0, Local Print      tamsulosin (FLOMAX) 0.4 MG capsule Take 1 capsule (0.4 mg) by mouth daily for 7 days, Disp-7 capsule, R-0, Local Print               Juan Dowd  8/31/2018    EMERGENCY DEPARTMENT    I, Juan Dowd, am serving as a scribe at 8:18 PM on 8/31/2018 to document services personally performed by Joy Carmen MD based on my observations and the provider's statements to me.        Joy Carmen MD  09/02/18 0208

## 2018-09-01 NOTE — DISCHARGE INSTRUCTIONS
Your kidney stone will likely pass on its own. To help, take Flomax until it has passed. Strain your urine to look for stone.  Use naproxen for mild to moderate pain and Norco for severe pain.  Use Zofran as needed for nausea or vomiting.  You can stop Pyridium, but continue antibiotics prescribed by Urgent Care.  Follow up with urology.  Return to the ER with uncontrolled pain, fever >100.4F, decreased urine output, or any other new or concerning symptoms.

## 2018-09-02 ASSESSMENT — ENCOUNTER SYMPTOMS
FEVER: 1
FREQUENCY: 1
DYSURIA: 0

## 2018-09-03 LAB
BACTERIA SPEC CULT: ABNORMAL
Lab: ABNORMAL
SPECIMEN SOURCE: ABNORMAL

## 2018-09-04 ENCOUNTER — TELEPHONE (OUTPATIENT)
Dept: FAMILY MEDICINE | Facility: CLINIC | Age: 33
End: 2018-09-04

## 2018-09-04 NOTE — TELEPHONE ENCOUNTER
Patient calling, reports she was in ED 8/31/18 and told she has a kidney stone. Given flomax and 12 norco tabs and told to contact urology. Patient reports she still hasnt passed stone and she is wondering what to do. I advised her to continue to push fluids, take flomax and strain urine. I asked if she has contacted urology yet and she reports she has not. She is wondering what to do about the pain, and states she is almost out of norco. States she has been taking 3-4 tabs per day. I advised her I would check with provider but unsure of refill given her hx and again advised her to contact urology today.     8/31/18 ED note:    Medical Decision Making:  Julia is a 33-year-old female with chronic pain and multiple visits for pelvic and abdominal pain who presents with left-sided flank pain.  Patient was seen by urgent care 2 days ago for urinary symptoms and started on Ceftin.  However, she has since developed this left-sided flank pain which radiates to her left lower quadrant and has also had vomiting. She has not had improvement with Tylenol or naproxen.  No measured fever.  On exam, patient appears uncomfortable and has tenderness to the left flank including CVA in the left lower quadrant.  Patient has a care plan in place due to her multiple visits and history of chronic pain.  Thus, I initially treated with [place] and Toradol.  She was also given Zofran and IV fluids while undergoing her workup.  Although I would have liked to avoid radiation in this patient, the severity of her pain and worsening of symptoms since a visit to urgent care despite antibiotics did warnt CT of the abdomen and pelvis.  This reveals mild left-sided hydronephrosis as a result of a 2 mm distal left ureteral stone with no evidence of other acute pathologies including no convincing evidence for pyelonephritis.  Patient's initial urinalysis had 40 white blood cells per high-powered field but is a very dirty sample with 23 seconds  epithelial cells.  Thus, she was then straight cathed and in order to obtain a second sample.  This reveals only 12 white blood cells per high-powered field and although this is nitrite positive this is probably a false positive secondary to use of Pyridium which is started at urgent care.  While I do not believe she has an infected stone, she does continue to have pyuria and is already on antibiotics, so I recommended she continue the Ceftin as prescribed.  However, I recommended she discontinue Pyridium as she is not having dysuria.  It is of note that the remainder of her workup is unremarkable with no leukocytosis, normal electrolytes, and a creatinine of 0.79.  When I identified patient's pathology, I felt that in the acute setting as she did warrant IV narcotics.  She was at this given 2 small doses of morphine with appropriate improvement in her pain.  On reevaluation she states she is feeling improved though she continues to endorse 8/10 pain.  I offered her an observation stay for intractable pain though she declines and would prefer to be discharged.  I discussed the results of the laboratory and imaging studies as well as a trial of passage which I suspect will be successful given the very small size of her stone.  I recommended she take Flomax and strain her urine and continue the naproxen as prescribed.  I did provide a prescription for Norco, again, because this is an acute pathology.  She has not had narcotic prescriptions in the last 1 month (last fill only 15 tablets).  She has had no further nausea and vomiting after Zofran provided here and I provided her with a prescription for use at home as well.  I recommended patient follow-up with urology though I have provided return precautions in the interim.  Answered all patient's questions and she verbalized understanding.  Amenable to discharge.     Triage to call with response [phone] okay to leave detailed message.     Inessa Moeller RN    [facility] - Triage

## 2018-09-04 NOTE — TELEPHONE ENCOUNTER
Spoke with patient and informed of below. Scheduled for OV 9/6/18. She asked if she should go to UC in the meantime to get narcotics, I advised her that it appears she is restricted so not sure if they will prescribe but that is up to her.   Inessa Moeller RN   [facility] - Triage

## 2018-09-05 ENCOUNTER — OFFICE VISIT (OUTPATIENT)
Dept: URGENT CARE | Facility: URGENT CARE | Age: 33
End: 2018-09-05
Payer: COMMERCIAL

## 2018-09-05 VITALS
SYSTOLIC BLOOD PRESSURE: 112 MMHG | RESPIRATION RATE: 18 BRPM | HEART RATE: 115 BPM | BODY MASS INDEX: 25.06 KG/M2 | TEMPERATURE: 98.7 F | OXYGEN SATURATION: 98 % | DIASTOLIC BLOOD PRESSURE: 90 MMHG | WEIGHT: 146 LBS

## 2018-09-05 DIAGNOSIS — Z87.442 HISTORY OF RENAL CALCULI: ICD-10-CM

## 2018-09-05 DIAGNOSIS — R10.9 LEFT FLANK PAIN: Primary | ICD-10-CM

## 2018-09-05 LAB
ALBUMIN UR-MCNC: NEGATIVE MG/DL
APPEARANCE UR: CLEAR
BACTERIA #/AREA URNS HPF: ABNORMAL /HPF
BILIRUB UR QL STRIP: NEGATIVE
COLOR UR AUTO: YELLOW
CREAT SERPL-MCNC: 0.74 MG/DL (ref 0.52–1.04)
GFR SERPL CREATININE-BSD FRML MDRD: >90 ML/MIN/1.7M2
GLUCOSE UR STRIP-MCNC: NEGATIVE MG/DL
HGB UR QL STRIP: NEGATIVE
KETONES UR STRIP-MCNC: NEGATIVE MG/DL
LEUKOCYTE ESTERASE UR QL STRIP: ABNORMAL
NITRATE UR QL: NEGATIVE
NON-SQ EPI CELLS #/AREA URNS LPF: ABNORMAL /LPF
PH UR STRIP: 6.5 PH (ref 5–7)
RBC #/AREA URNS AUTO: ABNORMAL /HPF
SOURCE: ABNORMAL
SP GR UR STRIP: 1.02 (ref 1–1.03)
UROBILINOGEN UR STRIP-ACNC: 0.2 EU/DL (ref 0.2–1)
WBC # BLD AUTO: 5.3 10E9/L (ref 4–11)
WBC #/AREA URNS AUTO: ABNORMAL /HPF
YEAST #/AREA URNS HPF: ABNORMAL /HPF

## 2018-09-05 PROCEDURE — 99214 OFFICE O/P EST MOD 30 MIN: CPT | Mod: 25 | Performed by: FAMILY MEDICINE

## 2018-09-05 PROCEDURE — 85048 AUTOMATED LEUKOCYTE COUNT: CPT | Performed by: FAMILY MEDICINE

## 2018-09-05 PROCEDURE — 36415 COLL VENOUS BLD VENIPUNCTURE: CPT | Performed by: FAMILY MEDICINE

## 2018-09-05 PROCEDURE — 82565 ASSAY OF CREATININE: CPT | Performed by: FAMILY MEDICINE

## 2018-09-05 PROCEDURE — 96372 THER/PROPH/DIAG INJ SC/IM: CPT | Performed by: FAMILY MEDICINE

## 2018-09-05 PROCEDURE — 81001 URINALYSIS AUTO W/SCOPE: CPT | Performed by: FAMILY MEDICINE

## 2018-09-05 PROCEDURE — 87086 URINE CULTURE/COLONY COUNT: CPT | Performed by: FAMILY MEDICINE

## 2018-09-05 RX ORDER — HYDROCODONE BITARTRATE AND ACETAMINOPHEN 5; 325 MG/1; MG/1
1 TABLET ORAL EVERY 6 HOURS PRN
Qty: 4 TABLET | Refills: 0 | Status: SHIPPED | OUTPATIENT
Start: 2018-09-05 | End: 2018-09-06

## 2018-09-05 RX ORDER — KETOROLAC TROMETHAMINE 30 MG/ML
60 INJECTION, SOLUTION INTRAMUSCULAR; INTRAVENOUS ONCE
Qty: 2 ML | Refills: 0 | OUTPATIENT
Start: 2018-09-05 | End: 2018-09-05

## 2018-09-05 ASSESSMENT — PAIN SCALES - GENERAL: PAINLEVEL: WORST PAIN (10)

## 2018-09-05 NOTE — MR AVS SNAPSHOT
After Visit Summary   9/5/2018    Julia Smith    MRN: [id number]           Patient Information     Date Of Birth          1985        Visit Information        Provider Department      9/5/2018 12:30 PM Georges Henriquez,  [facility]        Today's Diagnoses     Left flank pain    -  1    History of renal calculi           Follow-ups after your visit        Your next 10 appointments already scheduled     Sep 06, 2018 11:40 AM CDT   Office Visit with Robin Tong MD   [facility] ([facility])    [address] [place] [id number] [phone]           Bring a current list of meds and any records pertaining to this visit. For Physicals, please bring immunization records and any forms needing to be filled out. Please arrive 10 minutes early to complete paperwork.              Who to contact     If you have questions or need follow up information about today's clinic visit or your schedule please contact [facility] directly at [phone].  Normal or non-critical lab and imaging results will be communicated to you by [email or web address], letter or phone within 4 business days after the clinic has received the results. If you do not hear from us within 7 days, please contact the clinic through [employer] or phone. If you have a critical or abnormal lab result, we will notify you by phone as soon as possible.  Submit refill requests through [employer] or call your pharmacy and they will forward the refill request to us. Please allow 3 business days for your refill to be completed.          Additional Information About Your Visit        MyChart Information     [employer] gives you secure access to your electronic health record. If you see a primary care provider, you can also send messages to your care team and make appointments. If you have questions, please call your primary care clinic.  If you do not  have a primary care provider, please call [phone] and they will assist you.        Care EveryWhere ID     This is your Care EveryWhere ID. This could be used by other organizations to access your [place] medical records  [id number]        Your Vitals Were     Pulse Temperature Respirations Last Period Pulse Oximetry BMI (Body Mass Index)    115 98.7  F (37.1  C) (Tympanic) 18 10/23/2014 (LMP Unknown) 98% 25.06 kg/m2       Blood Pressure from Last 3 Encounters:   09/05/18 112/90   08/31/18 112/76   08/28/18 100/60    Weight from Last 3 Encounters:   09/05/18 146 lb (66.2 kg)   08/31/18 137 lb (62.1 kg)   08/28/18 139 lb 4.8 oz (63.2 kg)              We Performed the Following     Creatinine     UA with Microscopic reflex to Culture     Urine Culture Aerobic Bacterial     WBC count          Today's Medication Changes          These changes are accurate as of 9/5/18  1:53 PM.  If you have any questions, ask your nurse or doctor.               Start taking these medicines.        Dose/Directions    HYDROcodone-acetaminophen 5-325 MG per tablet   Commonly known as:  NORCO   Used for:  Left flank pain, History of renal calculi   Started by:  Georges Henriquez DO        Dose:  1 tablet   Take 1 tablet by mouth every 6 hours as needed for severe pain   Quantity:  4 tablet   Refills:  0       ketorolac 60 MG/2ML Soln injection   Commonly known as:  TORADOL   Used for:  Left flank pain, History of renal calculi   Started by:  Georges Henriquez DO        Dose:  60 mg   Inject 2 mLs (60 mg) into the muscle once for 1 dose   Quantity:  2 mL   Refills:  0            Where to get your medicines      Some of these will need a paper prescription and others can be bought over the counter.  Ask your nurse if you have questions.     Bring a paper prescription for each of these medications     HYDROcodone-acetaminophen 5-325 MG per tablet       You don't need a prescription for these medications     ketorolac 60 MG/2ML Soln  injection               Information about OPIOIDS     PRESCRIPTION OPIOIDS: WHAT YOU NEED TO KNOW   We gave you an opioid (narcotic) pain medicine. It is important to manage your pain, but opioids are not always the best choice. You should first try all the other options your care team gave you. Take this medicine for as short a time (and as few doses) as possible.    Some activities can increase your pain, such as bandage changes or therapy sessions. It may help to take your pain medicine 30 to 60 minutes before these activities. Reduce your stress by getting enough sleep, working on hobbies you enjoy and practicing relaxation or meditation. Talk to your care team about ways to manage your pain beyond prescription opioids.    These medicines have risks:    DO NOT drive when on new or higher doses of pain medicine. These medicines can affect your alertness and reaction times, and you could be arrested for driving under the influence (DUI). If you need to use opioids long-term, talk to your care team about driving.    DO NOT operate heavy machinery    DO NOT do any other dangerous activities while taking these medicines.    DO NOT drink any alcohol while taking these medicines.     If the opioid prescribed includes acetaminophen, DO NOT take with any other medicines that contain acetaminophen. Read all labels carefully. Look for the word  acetaminophen  or  Tylenol.  Ask your pharmacist if you have questions or are unsure.    You can get addicted to pain medicines, especially if you have a history of addiction (chemical, alcohol or substance dependence). Talk to your care team about ways to reduce this risk.    All opioids tend to cause constipation. Drink plenty of water and eat foods that have a lot of fiber, such as fruits, vegetables, prune juice, apple juice and high-fiber cereal. Take a laxative (Miralax, milk of magnesia, Colace, Senna) if you don t move your bowels at least every other day. Other side effects  include upset stomach, sleepiness, dizziness, throwing up, tolerance (needing more of the medicine to have the same effect), physical dependence and slowed breathing.    Store your pills in a secure place, locked if possible. We will not replace any lost or stolen medicine. If you don t finish your medicine, please throw away (dispose) as directed by your pharmacist. The [place] Pollution Control Agency has more information about safe disposal: [email or web address].[email or web address]-medications         Primary Care Provider Office Phone # Fax #    Jillian Palma [phone]       [address] [place] [id number]        Equal Access to Services     CHCIHI HUGHES : Hadii ty zimmermano Socollins, waaxda luqadaha, qaybta kaalmada sitayaizzy, shimon black . So [facility] [phone].    ATENCIÓN: Si habla español, tiene a smith disposición servicios gratuitos de asistencia lingüística. Ll[facility] [phone].    We comply with applicable federal civil rights laws and [place] laws. We do not discriminate on the basis of race, color, national origin, age, disability, sex, sexual orientation, or gender identity.            Thank you!     Thank you for choosing [facility]  for your care. Our goal is always to provide you with excellent care. Hearing back from our patients is one way we can continue to improve our services. Please take a few minutes to complete the written survey that you may receive in the mail after your visit with us. Thank you!             Your Updated Medication List - Protect others around you: Learn how to safely use, store and throw away your medicines at www.disposemymeds.org.          This list is accurate as of 9/5/18  1:53 PM.  Always use your most recent med list.                   Brand Name Dispense Instructions for use Diagnosis    acetaminophen 325 MG tablet    TYLENOL    100 tablet     Take 2 tablets (650 mg) by mouth every 4 hours as needed for other (mild pain)    Acute post-operative pain       ADDERALL XR PO      Take 10 mg by mouth every morning (patient takes 2 X 5 mg = 10 mg dose)        AMITRIPTYLINE HCL PO      Take 25 mg by mouth At Bedtime        cefuroxime 250 MG tablet    CEFTIN    20 tablet    Take 1 tablet (250 mg) by mouth 2 times daily    Dysuria       CLONAZEPAM PO      Take 1 mg by mouth 2 times daily as needed for anxiety        ESTRADIOL PO      Take 1 mg by mouth every morning        FLONASE ALLERGY RELIEF NA      Spray 1 spray in nostril daily as needed        GABAPENTIN PO      Take 400 mg by mouth 3 times daily        HYDROcodone-acetaminophen 5-325 MG per tablet    NORCO    4 tablet    Take 1 tablet by mouth every 6 hours as needed for severe pain    Left flank pain, History of renal calculi       hydrOXYzine 25 MG tablet    ATARAX    30 tablet    Take 1 tablet (25 mg) by mouth every 6 hours as needed for itching (and nausea)    Acute post-operative pain       ketorolac 60 MG/2ML Soln injection    TORADOL    2 mL    Inject 2 mLs (60 mg) into the muscle once for 1 dose    Left flank pain, History of renal calculi       LAMOTRIGINE PO      Take 100 mg by mouth 2 times daily        MIRALAX Packet   Generic drug:  polyethylene glycol      Take 17 g by mouth daily as needed for constipation        nicotine 10 MG Inhaler    NICOTROL     Inhale 6-16 Cartridges into the lungs daily as needed for smoking cessation        ondansetron 4 MG ODT tab    ZOFRAN ODT    10 tablet    Take 1 tablet (4 mg) by mouth every 8 hours as needed for nausea        oxyCODONE IR 5 MG tablet    ROXICODONE    40 tablet    Take 1-2 tablets (5-10 mg) by mouth every 3 hours as needed for pain or other (Moderate to Severe)    Acute post-operative pain       phenazopyridine 200 MG tablet    PYRIDIUM    6 tablet    Take 1 tablet (200 mg) by mouth 3 times daily as needed for irritation    Nonspecific finding  on examination of urine       promethazine 25 MG tablet    PHENERGAN    15 tablet    Take 1 tablet (25 mg) by mouth every 6 hours as needed for nausea    Nausea and vomiting, intractability of vomiting not specified, unspecified vomiting type       SERTRALINE HCL PO      Take 150 mg by mouth every morning (patient takes 1.5 X 100 mg = 150 mg dose)        tamsulosin 0.4 MG capsule    FLOMAX    7 capsule    Take 1 capsule (0.4 mg) by mouth daily for 7 days        VALIUM PO      Place 10 mg vaginally daily as needed

## 2018-09-05 NOTE — PROGRESS NOTES
"SUBJECTIVE: Julia Smith is a 33 year old female presenting with a chief complaint of left flank pain since being dx with kidney stone 8-31 at ED with CT scan.  Onset of symptoms was day(s) ago.  Course of illness is same.    Severity moderate  Current and Associated symptoms: pain  Treatment measures tried include Norco.  Predisposing factors include recent dx of kidney stone.    Past Medical History:   Diagnosis Date     Agoraphobia      Anxiety, generalized      Bipolar 2 disorder (H) 12/17/2013    eleno and inocente - martina     Endometriosis     confirmed by [facility]     Fibromyalgia      Gastro-oesophageal reflux disease      Migraine      OCD (obsessive compulsive disorder)      Other chronic pain     PELVIC     Panic attacks      PCOS (polycystic ovarian syndrome)      PTSD (post-traumatic stress disorder)      TMJ (dislocation of temporomandibular joint)      Allergies   Allergen Reactions     Decadron [Dexamethasone] Other (See Comments)     Muscle cramps  No problem with prednisone     Depakote [Divalproex Sodium]      \"slows vision  - Weird reaction\"     Ibuprofen Nausea and Vomiting     Tramadol Hives and Other (See Comments)     Causes adverse reaction with other medications that pt takes     Valproic Acid Rash     (Divalproex)     Social History   Substance Use Topics     Smoking status: Current Every Day Smoker     Packs/day: 0.50     Years: 5.00     Types: Cigarettes     Last attempt to quit: 7/30/2016     Smokeless tobacco: Never Used     Alcohol use 0.0 oz/week     0 Standard drinks or equivalent per week      Comment: rarely       ROS:  SKIN: no rash  GI: no vomiting    OBJECTIVE:  /90  Pulse 115  Temp 98.7  F (37.1  C) (Tympanic)  Resp 18  Wt 146 lb (66.2 kg)  LMP 10/23/2014 (LMP Unknown)  SpO2 98%  BMI 25.06 kg/[id number] APPEARANCE: healthy, alert and no distress  EYES: EOMI,  PERRL, conjunctiva clear  HENT: ear canals and TM's normal.  Nose and mouth without " ulcers, erythema or lesions  NECK: supple, nontender, no lymphadenopathy  RESP: lungs clear to auscultation - no rales, rhonchi or wheezes  CV: regular rates and rhythm, normal S1 S2, no murmur noted  ABDOMEN:  soft, nontender, no HSM or masses and bowel sounds normal  SKIN: no suspicious lesions or rashes  Left flank pain      ICD-10-CM    1. Left flank pain R10.9 UA with Microscopic reflex to Culture     WBC count     Creatinine     ketorolac (TORADOL) 60 MG/2ML SOLN injection     Urine Culture Aerobic Bacterial     HYDROcodone-acetaminophen (NORCO) 5-325 MG per tablet   2. History of renal calculi Z87.442 UA with Microscopic reflex to Culture     WBC count     Creatinine     ketorolac (TORADOL) 60 MG/2ML SOLN injection     HYDROcodone-acetaminophen (NORCO) 5-325 MG per tablet     Keep appointment with PCP tomorrow  Has Urology f/u scheduled  Fluids/Rest, f/u if worse/not any better

## 2018-09-07 LAB
BACTERIA SPEC CULT: NORMAL
BACTERIA SPEC CULT: NORMAL
SPECIMEN SOURCE: NORMAL

## 2019-01-02 ENCOUNTER — TELEPHONE (OUTPATIENT)
Dept: FAMILY MEDICINE | Facility: CLINIC | Age: 34
End: 2019-01-02

## 2019-02-19 ENCOUNTER — OFFICE VISIT (OUTPATIENT)
Dept: FAMILY MEDICINE | Facility: CLINIC | Age: 34
End: 2019-02-19
Payer: COMMERCIAL

## 2019-02-19 ENCOUNTER — TELEPHONE (OUTPATIENT)
Dept: FAMILY MEDICINE | Facility: CLINIC | Age: 34
End: 2019-02-19

## 2019-02-19 VITALS
HEIGHT: 64 IN | DIASTOLIC BLOOD PRESSURE: 86 MMHG | HEART RATE: 106 BPM | TEMPERATURE: 98.1 F | BODY MASS INDEX: 22.36 KG/M2 | SYSTOLIC BLOOD PRESSURE: 118 MMHG | OXYGEN SATURATION: 96 % | WEIGHT: 131 LBS

## 2019-02-19 DIAGNOSIS — R42 DIZZINESS: Primary | ICD-10-CM

## 2019-02-19 DIAGNOSIS — R63.4 WEIGHT LOSS: ICD-10-CM

## 2019-02-19 LAB
ERYTHROCYTE [DISTWIDTH] IN BLOOD BY AUTOMATED COUNT: 13.4 % (ref 10–15)
HBA1C MFR BLD: 5 % (ref 0–5.6)
HCT VFR BLD AUTO: 36.5 % (ref 35–47)
HGB BLD-MCNC: 12.1 G/DL (ref 11.7–15.7)
MCH RBC QN AUTO: 29.5 PG (ref 26.5–33)
MCHC RBC AUTO-ENTMCNC: 33.2 G/DL (ref 31.5–36.5)
MCV RBC AUTO: 89 FL (ref 78–100)
PLATELET # BLD AUTO: 309 10E9/L (ref 150–450)
RBC # BLD AUTO: 4.1 10E12/L (ref 3.8–5.2)
WBC # BLD AUTO: 7 10E9/L (ref 4–11)

## 2019-02-19 PROCEDURE — 84443 ASSAY THYROID STIM HORMONE: CPT | Performed by: FAMILY MEDICINE

## 2019-02-19 PROCEDURE — 93000 ELECTROCARDIOGRAM COMPLETE: CPT | Performed by: FAMILY MEDICINE

## 2019-02-19 PROCEDURE — 85027 COMPLETE CBC AUTOMATED: CPT | Performed by: FAMILY MEDICINE

## 2019-02-19 PROCEDURE — 80053 COMPREHEN METABOLIC PANEL: CPT | Performed by: FAMILY MEDICINE

## 2019-02-19 PROCEDURE — 83036 HEMOGLOBIN GLYCOSYLATED A1C: CPT | Performed by: FAMILY MEDICINE

## 2019-02-19 PROCEDURE — 82306 VITAMIN D 25 HYDROXY: CPT | Performed by: FAMILY MEDICINE

## 2019-02-19 PROCEDURE — 99214 OFFICE O/P EST MOD 30 MIN: CPT | Performed by: FAMILY MEDICINE

## 2019-02-19 PROCEDURE — 36415 COLL VENOUS BLD VENIPUNCTURE: CPT | Performed by: FAMILY MEDICINE

## 2019-02-19 ASSESSMENT — MIFFLIN-ST. JEOR: SCORE: 1289.21

## 2019-02-19 NOTE — TELEPHONE ENCOUNTER
Patient calling to report that for the last month or so she has been having syncopal episodes and has been losing weight without trying - approximately 20 lbs. She reports she is lightheaded/ dizzy every time she stands and passes out 1-2 times per day. She spoke with triage on 1/2/19 about this and she was advised to present to the ED. Patient never went because she states they are  mental in the ED. She only wants to see primary care clinic about this. She reports that she has been taking excedrin daily for headaches, used to take fioricet but insurance no longer covers it. She reports that she has not fallen and injured herself/ hit her head during any of these episodes. OV scheduled for today.     Inessa Moeller RN   [facility] - Triage

## 2019-02-19 NOTE — PROGRESS NOTES
"  SUBJECTIVE:   Julia Smith is a 33 year old female who presents to clinic today for the following health issues:      Dizziness      Duration: Nov 2019 on and off since then     Description   Feeling faint:  YES, she stands up feels tingly and gets dark and she 'faints' possible black out  . It has been happening twice / week. She thinsk her ' watched it and it was for few second although she thinks,  She is unsure if she  did loose consciousness \" . No possible seizure activity, or postictal state of confusion after dizzy spell.  No associated chest pain  but has been having  palpitation on and off, pounding in chest all the time\" it is better laying down, although it can happen just resting , not necessarily exertional\". No associated HA, although \"she always has HA and feel sob as such' . She does not thinks it is associated with stress or anxiety as she is seeing psychiatrist for that, ad her med's were adjusted. she was asked to increased Zoloft to 150 mg( from 100) and she thinks \" it could correlated to that change in dose \". She has started Abilify only couple of days ago, as well.         She has ongoing issue with  nausea for along time, no vomiting.  Bm is also loose diarrheal, sometimes  Since November  and she has lost weight despite eating ok    Feeling like the surroundings are moving: no   Loss of consciousness or falls: YES    Intensity:  moderate, severe, constant     Accompanying signs and symptoms:   Nausea/vomiting: YES  Palpitations: YES  Weakness in arms or legs: no   Vision or speech changes: no  Ringing in ears (Tinnitus): YES , unsure if could be related to  tmj   Hearing loss related to dizziness: no   Other (fevers/chills/sweating/dyspnea): YES- sweating more some hot flashes , she is s/p complete  hysterectomy     History (similar episodes/head trauma/previous evaluation/recent bleeding): None    Precipitating or alleviating factors (new med's/chemicals): None  Worse with " "activity/head movement: no     Therapies tried and outcome: None  Patient  report that for the last month or so she has been having syncopal episodes and has been losing weight without trying - approximately 20 lbs. She reports she is lightheaded/ dizzy every time she stands and passes out 1-2 times per day. She spoke with triage on 1/2/19 about this and she was advised to present to the ED. Patient never went because she states they\" always  her mental status  in the ED\" . She only wants to be see primary care clinic about this. She reports that she has been taking Excedrin daily for headaches, used to take Fioricet but insurance no longer covers it. She reports that she has not fallen and injured herself/ hit her head during any of these episodes.       Problem list and histories reviewed & adjusted, as indicated.  Additional history: as documented    Patient Active Problem List   Diagnosis     Fibromyalgia     PCOS (polycystic ovarian syndrome)     Constipation     Health Care Home     Anxiety     Endometriosis     Pain in joint, pelvic region and thigh     Bipolar 2 disorder (H)     Blood type A-     Chronic pain disorder     Depression     Glucose intolerance (impaired glucose tolerance)     Adopted     Tobacco abuse  2011- 5-15 @ 1/4 ppd= 1 pk yr hx      Proteinuria     Excessive daytime sleepiness     Syncope     Gastroesophageal reflux disease without esophagitis     Pelvic pain in female     Seroma, postoperative     Postoperative seroma involving genitourinary system after genitourinary procedure     Past Surgical History:   Procedure Laterality Date     COLONOSCOPY N/A 8/22/2014    Procedure: COLONOSCOPY;  Surgeon: Shannon Solis MD;  Location:  GI     COMBINED CYSTOSCOPY, INSERT CATHETER URETER Bilateral 5/9/2018    Procedure: COMBINED CYSTOSCOPY, INSERT CATHETER URETER;;  Surgeon: Marvin Umana MD;  Location:  OR     CYSTOSCOPY N/A 9/3/2014    Procedure: CYSTOSCOPY;  Surgeon: Russell, " Justin Ruth MD;  Location: [facility] OR     CYSTOSCOPY N/A 8/3/2016    Procedure: CYSTOSCOPY;  Surgeon: Justin Wells MD;  Location: [facility] OR     DAVINCI ASSISTED ABLATION / EXCISION OF ENDOMETRIOSIS N/A 8/3/2016    Procedure: DAVINCI ASSISTED ABLATION / EXCISION OF ENDOMETRIOSIS;  Surgeon: Justin Wells MD;  Location: [facility] OR     DAVINCI HYSTERECTOMY TOTAL N/A 8/3/2016    Procedure: DAVINCI HYSTERECTOMY TOTAL;  Surgeon: Justin Wells MD;  Location: [facility] OR     DAVINCI LYSIS OF ADHESIONS N/A 5/9/2018    Procedure: DAVINCI LYSIS OF ADHESIONS;  CYSTOSCOPY, PLACEMENT OF BILATERAL URETERAL CATHETERS (SUE) DAVINCI LAPAROSCOPY, LYSIS ADHESIONS, LEFT URETEROLYSIS (KIN) ;  Surgeon: Justin Wells MD;  Location: [facility] OR     DAVINCI OOPHORECTOMY Right 7/19/2017    Procedure: DAVINCI OOPHORECTOMY;;  Surgeon: Justin Wells MD;  Location: [facility] OR     DAVINCI PELVIC PROCEDURE  9/5/2012    Procedure: DAVINCI PELVIC PROCEDURE;  Diagnostic Laparoscopy, Robotic Assisted Endometrial Resection, Tubal dye study ;  Surgeon: Johanny Velásquez DO;  Location: [facility] OR     DAVINCI PELVIC PROCEDURE N/A 9/3/2014    Procedure: DAVINCI PELVIC PROCEDURE;  Surgeon: Justin Wells MD;  Location: [facility]     DAVINCI PELVIC PROCEDURE Right 7/19/2017    Procedure: DAVINCI PELVIC PROCEDURE;  DAVINCI PELVISCOPY WITH RIGHT OOPHORECTOMY ;  Surgeon: Justin Wells MD;  Location: [facility] OR     DAVINCI SALPINGECTOMY Right 8/3/2016    Procedure: DAVINCI SALPINGECTOMY;  Surgeon: Justin Wells MD;  Location: [facility]     DAVINCI SALPINGO-OOPHORECTOMY INCLUDING BILATERAL Left 8/3/2016    Procedure: DAVINCI SALPINGO-OOPHORECTOMY INCLUDING BILATERAL;  Surgeon: Justin Wells MD;  Location: [facility]     DILATION AND CURETTAGE SUCTION WITH ULTRASOUND GUIDANCE  6/24/2014    Procedure: DILATION AND CURETTAGE SUCTION WITH ULTRASOUND GUIDANCE;  Surgeon: Johanny Velásquez DO;  Location:  OR      "DILATION AND CURETTAGE, HYSTEROSCOPY DIAGNOSTIC, COMBINED N/A 9/3/2014    Procedure: COMBINED DILATION AND CURETTAGE, HYSTEROSCOPY DIAGNOSTIC;  Surgeon: Justin Wells MD;  Location:  OR     HCL PAP SMEAR  2006    WNL     LAPAROSCOPIC APPENDECTOMY N/A 9/3/2014    Procedure: LAPAROSCOPIC APPENDECTOMY;  Surgeon: Ángel Duffy MD;  Location:  OR     LAPAROSCOPY DIAGNOSTIC (GYN)  2012    Procedure: LAPAROSCOPY DIAGNOSTIC (GYN);;  Surgeon: Johanny Velásquez DO;  Location: [facility] OR       Social History     Tobacco Use     Smoking status: Current Every Day Smoker     Packs/day: 0.50     Years: 5.00     Pack years: 2.50     Types: Cigarettes     Last attempt to quit: 2016     Years since quittin.5     Smokeless tobacco: Never Used   Substance Use Topics     Alcohol use: Yes     Alcohol/week: 0.0 oz     Comment: rarely     Family History   Adopted: Yes   Problem Relation Age of Onset     Depression Mother      Unknown/Adopted Mother         adopted     Unknown/Adopted Father         adopted     Depression Maternal Grandmother            Reviewed and updated as needed this visit by clinical staff       Reviewed and updated as needed this visit by Provider         ROS:  Constitutional, HEENT, cardiovascular, pulmonary, GI, , musculoskeletal, neuro, skin, endocrine and psych systems are negative, except as otherwise noted.    OBJECTIVE:     /86   Pulse 106   Temp 98.1  F (36.7  C) (Tympanic)   Ht 1.626 m (5' 4\")   Wt 59.4 kg (131 lb)   LMP 10/23/2014 (LMP Unknown)   SpO2 96%   BMI 22.49 kg/m    Body mass index is 22.49 kg/m .  GENERAL: healthy, alert and no distress  EYES: Eyes grossly normal to inspection, PERRL and conjunctivae and sclerae normal  HENT: ear canals and TM's normal, nose and mouth without ulcers or lesions  NECK: no adenopathy, no asymmetry, masses, or scars and thyroid normal to palpation  RESP: lungs clear to auscultation - no rales, rhonchi or wheezes  CV: " regular rate and rhythm, normal S1 S2, no S3 or S4, no murmur   ABDOMEN: soft, nontender, no hepatosplenomegaly, no masses and bowel sounds normal  NEURO: Normal strength and tone, mentation intact and speech normal  PSYCH: mentation appears normal, affect normal, anxious, fatigued, speech pressured, judgement and insight intact and appearance well groomed        ASSESSMENT/PLAN:       (R42) Dizziness  (primary encounter diagnosis)  Comment: Some ongoing recurrent symptoms, patient sort of vague about it    Plan: CBC with platelets, TSH with free T4 reflex,         Vitamin D Deficiency, Comprehensive metabolic         panel, Hemoglobin A1c, EKG 12-lead complete         w/read - Clinics            (R63.4) Weight loss  Comment:   Plan: CBC with platelets, TSH with free T4 reflex,         Vitamin D Deficiency, Comprehensive metabolic         panel, Hemoglobin A1c            Discussed possible differential diagnosis for her symptoms.  Clinically she is doing okay.  EKG is normal.  Check labs.  Not sure there could be some psych issues behind it, although I told patient that she keeps having any recurrent symptoms it is best for her to go to the emergency room to be further evaluated. Talked about warning s/s for which should be seen urgently.  Asked patient to monitor symptoms closely and to schedule a  follow-up visit with the PCP next week to further evaluate.  May consider further cardiology evaluation if needed.       Patient expressed understanding and agreement with treatment plan. All patient's questions were answered, will let me know if has more later.        Ellie Bryan MD  [facility] [place]

## 2019-02-20 LAB
ALBUMIN SERPL-MCNC: 4.2 G/DL (ref 3.4–5)
ALP SERPL-CCNC: 70 U/L (ref 40–150)
ALT SERPL W P-5'-P-CCNC: 13 U/L (ref 0–50)
ANION GAP SERPL CALCULATED.3IONS-SCNC: <1 MMOL/L (ref 3–14)
AST SERPL W P-5'-P-CCNC: 14 U/L (ref 0–45)
BILIRUB SERPL-MCNC: 0.2 MG/DL (ref 0.2–1.3)
BUN SERPL-MCNC: 12 MG/DL (ref 7–30)
CALCIUM SERPL-MCNC: 9 MG/DL (ref 8.5–10.1)
CHLORIDE SERPL-SCNC: 106 MMOL/L (ref 94–109)
CO2 SERPL-SCNC: 29 MMOL/L (ref 20–32)
CREAT SERPL-MCNC: 0.86 MG/DL (ref 0.52–1.04)
GFR SERPL CREATININE-BSD FRML MDRD: 89 ML/MIN/{1.73_M2}
GLUCOSE SERPL-MCNC: 95 MG/DL (ref 70–99)
POTASSIUM SERPL-SCNC: 3.7 MMOL/L (ref 3.4–5.3)
PROT SERPL-MCNC: 7.3 G/DL (ref 6.8–8.8)
SODIUM SERPL-SCNC: 135 MMOL/L (ref 133–144)
TSH SERPL DL<=0.005 MIU/L-ACNC: 2.29 MU/L (ref 0.4–4)

## 2019-02-21 LAB — DEPRECATED CALCIDIOL+CALCIFEROL SERPL-MC: 16 UG/L (ref 20–75)

## 2019-02-27 NOTE — PATIENT INSTRUCTIONS
Check labs  Cares and symptomatic treatment discussed.   follow up with PCP in next few days, sooner  if problem

## 2019-06-06 ENCOUNTER — APPOINTMENT (OUTPATIENT)
Dept: GENERAL RADIOLOGY | Facility: CLINIC | Age: 34
End: 2019-06-06
Attending: EMERGENCY MEDICINE
Payer: COMMERCIAL

## 2019-06-06 ENCOUNTER — NURSE TRIAGE (OUTPATIENT)
Dept: NURSING | Facility: CLINIC | Age: 34
End: 2019-06-06

## 2019-06-06 ENCOUNTER — HOSPITAL ENCOUNTER (EMERGENCY)
Facility: CLINIC | Age: 34
Discharge: HOME | End: 2019-06-06
Attending: EMERGENCY MEDICINE | Admitting: EMERGENCY MEDICINE
Payer: COMMERCIAL

## 2019-06-06 VITALS
DIASTOLIC BLOOD PRESSURE: 73 MMHG | SYSTOLIC BLOOD PRESSURE: 102 MMHG | HEIGHT: 64 IN | TEMPERATURE: 99.2 F | BODY MASS INDEX: 20.83 KG/M2 | HEART RATE: 91 BPM | RESPIRATION RATE: 19 BRPM | OXYGEN SATURATION: 97 % | WEIGHT: 122 LBS

## 2019-06-06 DIAGNOSIS — R55 SYNCOPE, UNSPECIFIED SYNCOPE TYPE: ICD-10-CM

## 2019-06-06 LAB
ALBUMIN SERPL-MCNC: 4 G/DL (ref 3.4–5)
ALBUMIN UR-MCNC: 10 MG/DL
ALP SERPL-CCNC: 66 U/L (ref 40–150)
ALT SERPL W P-5'-P-CCNC: 12 U/L (ref 0–50)
AMORPH CRY #/AREA URNS HPF: ABNORMAL /HPF
ANION GAP SERPL CALCULATED.3IONS-SCNC: 5 MMOL/L (ref 3–14)
APPEARANCE UR: ABNORMAL
AST SERPL W P-5'-P-CCNC: 9 U/L (ref 0–45)
BASOPHILS # BLD AUTO: 0 10E9/L (ref 0–0.2)
BASOPHILS NFR BLD AUTO: 0.3 %
BILIRUB SERPL-MCNC: 0.3 MG/DL (ref 0.2–1.3)
BILIRUB UR QL STRIP: NEGATIVE
BUN SERPL-MCNC: 9 MG/DL (ref 7–30)
CALCIUM SERPL-MCNC: 9 MG/DL (ref 8.5–10.1)
CHLORIDE SERPL-SCNC: 108 MMOL/L (ref 94–109)
CO2 SERPL-SCNC: 28 MMOL/L (ref 20–32)
COLOR UR AUTO: YELLOW
CREAT SERPL-MCNC: 0.82 MG/DL (ref 0.52–1.04)
DIFFERENTIAL METHOD BLD: NORMAL
EOSINOPHIL # BLD AUTO: 0 10E9/L (ref 0–0.7)
EOSINOPHIL NFR BLD AUTO: 0.6 %
ERYTHROCYTE [DISTWIDTH] IN BLOOD BY AUTOMATED COUNT: 12.9 % (ref 10–15)
GFR SERPL CREATININE-BSD FRML MDRD: >90 ML/MIN/{1.73_M2}
GLUCOSE SERPL-MCNC: 102 MG/DL (ref 70–99)
GLUCOSE UR STRIP-MCNC: NEGATIVE MG/DL
HCG UR QL: NEGATIVE
HCT VFR BLD AUTO: 35.9 % (ref 35–47)
HGB BLD-MCNC: 12.4 G/DL (ref 11.7–15.7)
HGB UR QL STRIP: NEGATIVE
IMM GRANULOCYTES # BLD: 0 10E9/L (ref 0–0.4)
IMM GRANULOCYTES NFR BLD: 0.1 %
INTERPRETATION ECG - MUSE: NORMAL
KETONES UR STRIP-MCNC: NEGATIVE MG/DL
LEUKOCYTE ESTERASE UR QL STRIP: NEGATIVE
LYMPHOCYTES # BLD AUTO: 2.8 10E9/L (ref 0.8–5.3)
LYMPHOCYTES NFR BLD AUTO: 40.7 %
MCH RBC QN AUTO: 30.3 PG (ref 26.5–33)
MCHC RBC AUTO-ENTMCNC: 34.5 G/DL (ref 31.5–36.5)
MCV RBC AUTO: 88 FL (ref 78–100)
MONOCYTES # BLD AUTO: 0.5 10E9/L (ref 0–1.3)
MONOCYTES NFR BLD AUTO: 7 %
MUCOUS THREADS #/AREA URNS LPF: PRESENT /LPF
NEUTROPHILS # BLD AUTO: 3.5 10E9/L (ref 1.6–8.3)
NEUTROPHILS NFR BLD AUTO: 51.3 %
NITRATE UR QL: NEGATIVE
NRBC # BLD AUTO: 0 10*3/UL
NRBC BLD AUTO-RTO: 0 /100
PH UR STRIP: 7 PH (ref 5–7)
PLATELET # BLD AUTO: 265 10E9/L (ref 150–450)
POTASSIUM SERPL-SCNC: 4.1 MMOL/L (ref 3.4–5.3)
PROT SERPL-MCNC: 7.1 G/DL (ref 6.8–8.8)
RBC # BLD AUTO: 4.09 10E12/L (ref 3.8–5.2)
RBC #/AREA URNS AUTO: 5 /HPF (ref 0–2)
SODIUM SERPL-SCNC: 141 MMOL/L (ref 133–144)
SOURCE: ABNORMAL
SP GR UR STRIP: 1.02 (ref 1–1.03)
SQUAMOUS #/AREA URNS AUTO: 2 /HPF (ref 0–1)
UROBILINOGEN UR STRIP-MCNC: NORMAL MG/DL (ref 0–2)
WBC # BLD AUTO: 6.8 10E9/L (ref 4–11)
WBC #/AREA URNS AUTO: 2 /HPF (ref 0–5)

## 2019-06-06 PROCEDURE — 96374 THER/PROPH/DIAG INJ IV PUSH: CPT

## 2019-06-06 PROCEDURE — 81025 URINE PREGNANCY TEST: CPT | Performed by: EMERGENCY MEDICINE

## 2019-06-06 PROCEDURE — 85025 COMPLETE CBC W/AUTO DIFF WBC: CPT | Performed by: EMERGENCY MEDICINE

## 2019-06-06 PROCEDURE — 81001 URINALYSIS AUTO W/SCOPE: CPT | Performed by: EMERGENCY MEDICINE

## 2019-06-06 PROCEDURE — 96361 HYDRATE IV INFUSION ADD-ON: CPT

## 2019-06-06 PROCEDURE — 25000132 ZZH RX MED GY IP 250 OP 250 PS 637: Performed by: EMERGENCY MEDICINE

## 2019-06-06 PROCEDURE — 80053 COMPREHEN METABOLIC PANEL: CPT | Performed by: EMERGENCY MEDICINE

## 2019-06-06 PROCEDURE — 71046 X-RAY EXAM CHEST 2 VIEWS: CPT

## 2019-06-06 PROCEDURE — 25000128 H RX IP 250 OP 636: Performed by: EMERGENCY MEDICINE

## 2019-06-06 PROCEDURE — 99285 EMERGENCY DEPT VISIT HI MDM: CPT | Mod: 25

## 2019-06-06 PROCEDURE — 93005 ELECTROCARDIOGRAM TRACING: CPT

## 2019-06-06 RX ORDER — LORAZEPAM 2 MG/ML
1 INJECTION INTRAMUSCULAR ONCE
Status: COMPLETED | OUTPATIENT
Start: 2019-06-06 | End: 2019-06-06

## 2019-06-06 RX ORDER — ACETAMINOPHEN 325 MG/1
650 TABLET ORAL ONCE
Status: COMPLETED | OUTPATIENT
Start: 2019-06-06 | End: 2019-06-06

## 2019-06-06 RX ADMIN — SODIUM CHLORIDE 1000 ML: 9 INJECTION, SOLUTION INTRAVENOUS at 17:52

## 2019-06-06 RX ADMIN — LORAZEPAM 1 MG: 2 INJECTION, SOLUTION INTRAMUSCULAR; INTRAVENOUS at 19:03

## 2019-06-06 RX ADMIN — ACETAMINOPHEN 650 MG: 325 TABLET, FILM COATED ORAL at 17:57

## 2019-06-06 ASSESSMENT — ENCOUNTER SYMPTOMS
HEADACHES: 1
LIGHT-HEADEDNESS: 1
FEVER: 0
UNEXPECTED WEIGHT CHANGE: 1
ABDOMINAL PAIN: 0
NECK PAIN: 0
BACK PAIN: 0
SHORTNESS OF BREATH: 0
CHEST TIGHTNESS: 1
VOMITING: 0
DIARRHEA: 1
NAUSEA: 0

## 2019-06-06 ASSESSMENT — MIFFLIN-ST. JEOR: SCORE: 1243.39

## 2019-06-06 NOTE — ED PROVIDER NOTES
History     Chief Complaint:  Loss of Consciousness    HPI   Julia Smith is a 34 year old female who presents for evaluation following a loss of consciousness. Today shortly prior to arrival, the patient was standing up and walking while talking to a friend over the phone when she started to develop lightheadedness, described as the sensation that she is going to lose consciousness, and chest tightness. The patient then lost consciousness and fell to the ground striking the back of her head against a wall. Since then, the patient has developed a minor headache. Due to this syncopal episode, the patient came into the ED for evaluation. She reports that she has occasionally had similar episodes of lightheadedness that seem to occur most frequently when she is standing. The patient additionally reports that she has been having episodes of diarrhea after eating for the last seven months, and she generally has three episodes of diarrhea each day. Since seven months ago she reports that she has unintentionally lost 40 pounds. Otherwise, she denies any recent fever, chest pain, shortness of breath, abdominal pain, nausea, vomiting, neck pain, or back pain.     Allergies:  Decadron   Depakote  Ibuprofen   Tramadol  Valproic acid      Medications:    acetaminophen (TYLENOL) 325 MG tablet  AMITRIPTYLINE HCL PO  Amphetamine-Dextroamphetamine (ADDERALL XR PO)  CLONAZEPAM PO  DiazePAM (VALIUM PO)  ESTRADIOL PO  Fluticasone Propionate (FLONASE ALLERGY RELIEF NA)  GABAPENTIN PO  hydrOXYzine (ATARAX) 25 MG tablet  LAMOTRIGINE PO  nicotine (NICOTROL) 10 MG Inhaler  oxyCODONE IR (ROXICODONE) 5 MG tablet  polyethylene glycol (MIRALAX) Packet  promethazine (PHENERGAN) 25 MG tablet  SERTRALINE HCL PO    Past Medical History:    Agoraphobia  Anxiety   Bipolar 2 disorder  Endometriosis   Fibromyalgia  GERD   Migraines   Obsessive compulsive disorder   Chronic pain   Panic attacks  Polycystic ovarian syndrome   Post-traumatic stress  "disorder  TMJ     Past Surgical History:    Colonoscopy  Combined cystoscopy, insert catheter ureter, bilateral   Cytoscopy  Davinci assisted ablation / excision of endometriosis  Davinci hysterectomy total  Davinci lysis of adhesions  Davinci oophorectomy   Davinci salpingectomy   Davinci salpingo-oophorectomy including bilateral   Laparoscopic appendectomy   Laparoscopy diagnostic     Family History:    Depression - Mother     Social History:  Tobacco use:    Current every day smoker - 0.50 packs / day for 5 years   Alcohol use:    Positive   Marital status:       Accompanied to ED by:  Friend      Review of Systems   Constitutional: Positive for unexpected weight change. Negative for fever.   Respiratory: Positive for chest tightness. Negative for shortness of breath.    Cardiovascular: Negative for chest pain.   Gastrointestinal: Positive for diarrhea. Negative for abdominal pain, nausea and vomiting.   Musculoskeletal: Negative for back pain and neck pain.   Neurological: Positive for syncope, light-headedness and headaches.   All other systems reviewed and are negative.      Physical Exam   First Vitals:  BP: (!) 132/91  Pulse: 113  Heart Rate: 102  Temp: 99.2  F (37.3  C)  Resp: 18  Height: 162.6 cm (5' 4\")  Weight: 55.3 kg (122 lb)  SpO2: 97 %  Lying Orthostatic BP: 106/76  Lying Orthostatic Pulse: 93 bpm  Sitting Orthostatic BP: 117/84  Sitting Orthostatic Pulse: 94 bpm  Standing Orthostatic BP: 89/74  Standing Orthostatic Pulse: 100 bpm      Physical Exam  Vitals: reviewed by me  General: Pt seen on [facility], cooperative, and alert to conversation  Eyes: Tracking well, clear conjunctiva BL  ENT: MMM, midline trachea.   Lungs:   No tachypnea, no accessory muscle use. No respiratory distress.   CV: Rate as above, regular rhythm.    Abd: Soft, non tender, no guarding, no rebound. Non distended  MSK: no peripheral edema or joint effusion.  No evidence of trauma  Skin: No rash, normal " turgor and temperature  Neuro: Clear speech and no facial droop.  Psych: Not RIS, no e/o AH/VH      Emergency Department Course   ECG (17:40:45):  Indication: Screening for cardiovascular disease.   Rate 96 bpm. AR interval 160 ms. QRS duration 70 ms. QT/QTc 344/434 ms. P-R-T axes 65 60 57.   Interpretation: Normal sinus rhythm, Normal ECG   Agree with computer interpretation. Yes   Interpreted at 1755 by Dr. Hanna.      Imaging:  Radiographic findings were communicated with the patient who voiced understanding of the findings.    XR chest:  IMPRESSION: No evidence of active cardiopulmonary disease.   Per radiology.     Laboratory:  CBC: WNL (WBC 6.8, HGB 12.4, )   CMP: Glucose 102 high, o/w WNL (Creatinine 0.82)   HCG Qualitative Urine: Negative   UA with Microscopic: Protein albumin 10, RBC 5 high, Squamous epithelial 2 high, Mucous present, Few amorphous crystals, o/w Negative     Interventions:  1752 NS 1,000 mL IV   1757 Tylenol 650 mg PO   1903 Ativan 1 mg IV     Emergency Department Course:  Nursing notes and vitals reviewed.  1727: I performed an exam of the patient as documented above.     1845: I updated and reassessed the patient.     1922: I updated and reassessed the patient.     Findings and plan explained to the Patient. Patient discharged home with instructions regarding supportive care, medications, and reasons to return. The importance of close follow-up was reviewed.     Impression & Plan      Medical Decision Making:  Julia Smith is a 34 year old female who presents to the emergency room with what appears to be a syncopal episode. Fortunately, she describes a very clear prodrome, and unfortunately has had this for some time. She appears stable here, normal vital signs, mildly anxious, but not complaining of any pain or cardiac symptoms. Her EKG is reassuring. She is not anemic. She feels better after IV fluids. Her vital signs are not orthostatic and she is ambulatory, strong,  and independent. Her labs are back and normal. She is asking to go home, and I do think it is reasonable. I did ask her to follow up with her regular doctor for an echo, as well as for additional cares as no clear cause of her syncope has been found here in the ER. This underscored primary care follow up and its importance. We went over the red flags for her to come back to the ER. The patient has no evidence of exertional syncope and has had this on and off for some time. Will discharge with very clear return to ED precautions and primary care follow up.     Diagnosis:    ICD-10-CM   1. Syncope, unspecified syncope type R55       Disposition:  Discharged to home.       I, Grupo Thompson, am serving as a scribe at 5:27 PM on 6/6/2019 to document services personally performed by Dr. Hanna, based on my observations and the provider's statements to me.     EMERGENCY DEPARTMENT       Abiel Hanna MD  06/06/19 0712

## 2019-06-06 NOTE — ED AVS SNAPSHOT
Emergency Department  [address] [facility] [address]  Phone:  [phone]  Fax:  [phone]                                    Julia Smith   MRN: [id number]    Department:   Emergency Department   Date of Visit:  6/6/2019           After Visit Summary Signature Page    I have received my discharge instructions, and my questions have been answered. I have discussed any challenges I see with this plan with the nurse or doctor.    ..........................................................................................................................................  Patient/Patient Representative Signature      ..........................................................................................................................................  Patient Representative Print Name and Relationship to Patient    ..................................................               ................................................  Date                                   Time    ..........................................................................................................................................  Reviewed by Signature/Title    ...................................................              ..............................................  Date                                               Time          22EPIC Rev 08/18

## 2019-06-06 NOTE — TELEPHONE ENCOUNTER
"Seen 2/19 in clinic for dizziness and syncope. Pt states she was advised stress was likely the etiology. Today pt states the syncope has become more frequent. Was 2x/wk in Feb; now \"almost daily\". Lost consciousness today @12pm. Now A/Ox3 but still c/o feeling weak and dizzy.  Happens after standing up even if she changes position slowly. C/o diarrhea every day and wt loss.  Drinking water and Gatorade \"all day long\".  Advised ED now.     Note:  States she has some chest pain now but \"no more that usual\" for her. Advised ED now. States she has been evaluated for chest pain in recent past and it was dx as non-cardiac.    Reason for Disposition    [1] Fainted > 15 minutes ago AND [2] still feels weak or dizzy    Additional Information    Negative: Still unconscious    Negative: Difficult to awaken or acting confused (e.g., disoriented, slurred speech)    Negative: Shock suspected (e.g., cold/pale/clammy skin, too weak to stand, low BP, rapid pulse)    Negative: Difficulty breathing    Negative: Bluish (or gray) lips or face now    Negative: Extra heart beats or heart is beating fast  (i.e.,\"palpitations\")    Negative: Bleeding (e.g., vomiting blood, rectal bleeding or tarry stools, severe vaginal bleeding)(Exception: fainted from sight of small amount of blood; small cut or abrasion)    Negative: Fainted suddenly after medicine, allergic food or bee sting    Negative: Age > 50 years (Exception: occurred > 1 hour ago AND now feels completely fine)    Negative: History of heart problems (e.g., congestive heart failure, heart attack)    Negative: [1] Fainted > 15 minutes ago AND [2] still feels too weak or dizzy to stand    Negative: Sounds like a life-threatening emergency to the triager    Negative: [1] Has diabetes (diabetes mellitus) AND [2] fainting from low blood sugar (i.e., < 70 mg/dl or 3.9 mmol/l)    Negative: Seizure suspected (e.g., muscle jerking or shaking followed by confusion)    Negative: Heat exhaustion " suspected (i.e., dehydration from heat exposure)    Negative: [1] Fainted > 15 minutes ago AND [2] still looks pale (pale skin, pallor)    Commented on: Chest pain     Not more than usual    Protocols used: [id number]

## 2019-06-08 ENCOUNTER — MYC MEDICAL ADVICE (OUTPATIENT)
Dept: FAMILY MEDICINE | Facility: CLINIC | Age: 34
End: 2019-06-08

## 2019-06-10 NOTE — TELEPHONE ENCOUNTER
Please see [employer] message and advise.      Thank you,  Tori GROVERN BSN  [facility]  [phone]

## 2019-07-19 ENCOUNTER — HOSPITAL ENCOUNTER (EMERGENCY)
Facility: CLINIC | Age: 34
Discharge: HOME | End: 2019-07-19
Attending: EMERGENCY MEDICINE | Admitting: EMERGENCY MEDICINE
Payer: COMMERCIAL

## 2019-07-19 VITALS
DIASTOLIC BLOOD PRESSURE: 67 MMHG | WEIGHT: 120 LBS | TEMPERATURE: 98.5 F | RESPIRATION RATE: 18 BRPM | BODY MASS INDEX: 20.49 KG/M2 | HEIGHT: 64 IN | OXYGEN SATURATION: 99 % | SYSTOLIC BLOOD PRESSURE: 110 MMHG

## 2019-07-19 DIAGNOSIS — R55 SYNCOPE, UNSPECIFIED SYNCOPE TYPE: ICD-10-CM

## 2019-07-19 LAB
ANION GAP SERPL CALCULATED.3IONS-SCNC: 2 MMOL/L (ref 3–14)
BASOPHILS # BLD AUTO: 0 10E9/L (ref 0–0.2)
BASOPHILS NFR BLD AUTO: 0.3 %
BUN SERPL-MCNC: 12 MG/DL (ref 7–30)
CALCIUM SERPL-MCNC: 8.7 MG/DL (ref 8.5–10.1)
CHLORIDE SERPL-SCNC: 109 MMOL/L (ref 94–109)
CO2 SERPL-SCNC: 32 MMOL/L (ref 20–32)
CREAT SERPL-MCNC: 0.71 MG/DL (ref 0.52–1.04)
DIFFERENTIAL METHOD BLD: NORMAL
EOSINOPHIL # BLD AUTO: 0.1 10E9/L (ref 0–0.7)
EOSINOPHIL NFR BLD AUTO: 0.9 %
ERYTHROCYTE [DISTWIDTH] IN BLOOD BY AUTOMATED COUNT: 12.9 % (ref 10–15)
GFR SERPL CREATININE-BSD FRML MDRD: >90 ML/MIN/{1.73_M2}
GLUCOSE SERPL-MCNC: 122 MG/DL (ref 70–99)
HCG SERPL QL: NEGATIVE
HCT VFR BLD AUTO: 35.8 % (ref 35–47)
HGB BLD-MCNC: 12.4 G/DL (ref 11.7–15.7)
IMM GRANULOCYTES # BLD: 0 10E9/L (ref 0–0.4)
IMM GRANULOCYTES NFR BLD: 0.1 %
INTERPRETATION ECG - MUSE: NORMAL
LYMPHOCYTES # BLD AUTO: 2.6 10E9/L (ref 0.8–5.3)
LYMPHOCYTES NFR BLD AUTO: 29.2 %
MCH RBC QN AUTO: 31 PG (ref 26.5–33)
MCHC RBC AUTO-ENTMCNC: 34.6 G/DL (ref 31.5–36.5)
MCV RBC AUTO: 90 FL (ref 78–100)
MONOCYTES # BLD AUTO: 0.5 10E9/L (ref 0–1.3)
MONOCYTES NFR BLD AUTO: 5.5 %
NEUTROPHILS # BLD AUTO: 5.6 10E9/L (ref 1.6–8.3)
NEUTROPHILS NFR BLD AUTO: 64 %
NRBC # BLD AUTO: 0 10*3/UL
NRBC BLD AUTO-RTO: 0 /100
PLATELET # BLD AUTO: 284 10E9/L (ref 150–450)
POTASSIUM SERPL-SCNC: 4 MMOL/L (ref 3.4–5.3)
RBC # BLD AUTO: 4 10E12/L (ref 3.8–5.2)
SODIUM SERPL-SCNC: 143 MMOL/L (ref 133–144)
WBC # BLD AUTO: 8.8 10E9/L (ref 4–11)

## 2019-07-19 PROCEDURE — 25000128 H RX IP 250 OP 636: Performed by: EMERGENCY MEDICINE

## 2019-07-19 PROCEDURE — 99284 EMERGENCY DEPT VISIT MOD MDM: CPT | Mod: 25

## 2019-07-19 PROCEDURE — 96374 THER/PROPH/DIAG INJ IV PUSH: CPT

## 2019-07-19 PROCEDURE — 25000132 ZZH RX MED GY IP 250 OP 250 PS 637: Performed by: EMERGENCY MEDICINE

## 2019-07-19 PROCEDURE — 93005 ELECTROCARDIOGRAM TRACING: CPT

## 2019-07-19 PROCEDURE — 96361 HYDRATE IV INFUSION ADD-ON: CPT

## 2019-07-19 PROCEDURE — 84703 CHORIONIC GONADOTROPIN ASSAY: CPT | Performed by: EMERGENCY MEDICINE

## 2019-07-19 PROCEDURE — 80048 BASIC METABOLIC PNL TOTAL CA: CPT | Performed by: EMERGENCY MEDICINE

## 2019-07-19 PROCEDURE — 85025 COMPLETE CBC W/AUTO DIFF WBC: CPT | Performed by: EMERGENCY MEDICINE

## 2019-07-19 RX ORDER — MECLIZINE HYDROCHLORIDE 25 MG/1
25 TABLET ORAL ONCE
Status: COMPLETED | OUTPATIENT
Start: 2019-07-19 | End: 2019-07-19

## 2019-07-19 RX ORDER — ONDANSETRON 2 MG/ML
4 INJECTION INTRAMUSCULAR; INTRAVENOUS ONCE
Status: COMPLETED | OUTPATIENT
Start: 2019-07-19 | End: 2019-07-19

## 2019-07-19 RX ADMIN — SODIUM CHLORIDE 1000 ML: 9 INJECTION, SOLUTION INTRAVENOUS at 15:49

## 2019-07-19 RX ADMIN — ONDANSETRON 4 MG: 2 INJECTION INTRAMUSCULAR; INTRAVENOUS at 15:48

## 2019-07-19 RX ADMIN — MECLIZINE HYDROCHLORIDE 25 MG: 25 TABLET ORAL at 16:46

## 2019-07-19 ASSESSMENT — ENCOUNTER SYMPTOMS
CONFUSION: 1
LIGHT-HEADEDNESS: 1
DIZZINESS: 1
WEAKNESS: 1
HEADACHES: 1
NERVOUS/ANXIOUS: 1
SEIZURES: 0
NUMBNESS: 1

## 2019-07-19 ASSESSMENT — MIFFLIN-ST. JEOR: SCORE: 1234.32

## 2019-07-19 NOTE — ED AVS SNAPSHOT
Emergency Department  [address] [place] [address]  Phone:  [phone]  Fax:  [phone]                                    Julia Smith   MRN: [id number]    Department:   Emergency Department   Date of Visit:  7/19/2019           After Visit Summary Signature Page    I have received my discharge instructions, and my questions have been answered. I have discussed any challenges I see with this plan with the nurse or doctor.    ..........................................................................................................................................  Patient/Patient Representative Signature      ..........................................................................................................................................  Patient Representative Print Name and Relationship to Patient    ..................................................               ................................................  Date                                   Time    ..........................................................................................................................................  Reviewed by Signature/Title    ...................................................              ..............................................  Date                                               Time          22EPIC Rev 08/18

## 2019-07-19 NOTE — ED PROVIDER NOTES
History     Chief Complaint  Syncope      HPI   Julia Smith is a 34 year old female with a history of syncope, migraines, and several mental health diagnoses who presents to the emergency department with her  for evaluation of syncope. Of note, the patient was seen in the ED for a syncopal episode on 6/6/19 and had a negative EKG and reassuring workup. Here, the patient reports that prior to arrival she stood up slowly when she suddenly had tunnel vision and lightheadedness followed by a syncopal episode. Her  was able to lower her to the ground and when she came to after a few seconds, she had diffuse numbness, tingling on her whole body. No whole body shaking. Now, she is feeling weak, dizzy, and has a mild posterior headache. She does note that she has had chronic diarrhea after each meal since December 2018, but has otherwise not been ill. She has been eating and drinking as normal. She denies biting her tongue or loss of control of her bowel or bladder. No fever. No injuries from fall. No chest pain or shortness of breath. No hx of DVT/PE. No palpitations.    Allergies:  Decadron  Depakote  Ibuprofen  Tramadol  Valproic acid      Medications:    Tylenol  Amitriptyline  Adderall XR  Clonazepam  Valium  Estradiol   Gabapentin  Atarax  Lamotrigine  Nicotrol   Roxicodone  Miralax  Phenergan  Sertraline      Past Medical History:    Agoraphobia  Anxiety  Bipolar 2 disorder   Endometriosis  Fibromyalgia  GERD  Migraines  OCD  Other chronic pain  Panic attacks  PCOS  PTSD  TMJ dislocation   Syncope  Proteinuria  Constipation     Past Surgical History:    Colonoscopy  Combined cystoscopy   Davinci ablation of endometriosis  Davinci hysterectomy   Oophorectomy  Salpingectomy   D&C  Appendectomy     Family History:    Depression    Social History:  Tobacco Use: Current every day smoker: 0.5 ppd  Alcohol Use: Rare  PCP: Jillian Palma  Marital Status:        Review of Systems   Eyes: Positive  "for visual disturbance.   Neurological: Positive for dizziness, syncope, weakness, light-headedness, numbness and headaches. Negative for seizures.   Psychiatric/Behavioral: Positive for confusion. The patient is nervous/anxious.    All other systems reviewed and are negative.    Physical Exam     Patient Vitals for the past 24 hrs:   BP Temp Heart Rate Resp SpO2 Height Weight   07/19/19 1718 110/67 -- 90 18 99 % -- --   07/19/19 1514 105/64 98.5  F (36.9  C) 105 16 98 % 1.626 m (5' 4\") 54.4 kg (120 lb)     Physical Exam    General: Resting comfortably on the gurney  Eyes:  The pupils are equal and round    Conjunctivae and sclerae are normal  ENT:    Moist mucous membranes  Neck:  Normal range of motion  CV:  Tachycardic rate and regular rhythm    Skin warm and well perfused   Resp:  Lungs are clear    Non-labored    No rales    No wheezing   GI:  Abdomen is soft, there is no rigidity    No distension    No rebound tenderness     No abdominal tenderness  MS:  Normal muscular tone  Skin:  No rash or acute skin lesions noted  Neuro:   Awake, alert.      Speech is normal and fluent.    Face is symmetric, no facial droop     Moves all extremities equally, no arm or leg drift    SILT on bilateral UE/LE  Psych: Normal affect.  Appropriate interactions.    Emergency Department Course   ECG:  @ 1527  Indication: Syncope  Vent. Rate 98 bpm. IN interval 152 ms. QRS duration 74 ms. QT/QTc 352/449 ms. P-R-T axis 65 63 55.   Normal sinus rhythm. Possible left atrial enlargement. Nonspecific T wave abnormality. Abnormal ECG.  No significant change when compared to previous ECG from 6/6/19   Read @ 1537 by Dr. Estrada.    Laboratory:  CBC: WBC: 8.8, HGB: 12.4, PLT: 284  BMP: Glucose 122 (H), Anion gap: 2 (H), o/w WNL (Creatinine: 0.71)    HCG qualitative pregnancy (blood): Negative    Interventions:  1548 Zofran 4 mg IV  1549 0.9% Sodium Chloride BOLUS 1000 mLs IV   1646 Antivert 25 mg tablet PO    Emergency Department " Course:  1529 Nursing notes and vitals reviewed. I performed an exam of the patient as documented above.     IV inserted. Medicine administered as documented above. Blood drawn. This was sent to the lab for further testing, results above.    EKG was done, interpretation as above.    1631 I rechecked the patient and discussed the results of her workup thus far.     1635 Patient ambulated in the ED with no assistance.     Findings and plan explained to the Patient. Patient discharged home with instructions regarding supportive care, medications, and reasons to return. The importance of close follow-up was reviewed.    I personally reviewed the laboratory results with the Patient and answered all related questions prior to discharge.     Impression & Plan    Medical Decision Making:  Julia Smith is a 34 year old female who presents with a history and clinical exam consistent with syncope.  I considered a broad differential for their syncope today including cardiac arrhythmia, ACS, aortic stenosis,  PE, orthostatic hypotension, drugs, situational, carotid hypersensitivity, seizure, TIA, stroke, vasovagal. There are no signs of a concerning etiology for syncope at this point.  In addition, there is no family history of sudden death, no chest pain, no focal neurologic symptoms, and no complaints of concerning headache.  The workup in the ED is negative and the physical exam is re-assuring. Has no focal findings on exam to suggest CVA/TIA. Doubt seizure given the history. Recommended follow up with PCP.    Diagnosis:    ICD-10-CM    1. Syncope, unspecified syncope type R55        Disposition:  Discharged to home    Scribe Disclosure:  Matt LEIGH, am serving as a scribe on 7/19/2019 at 3:29 PM to personally document services performed by Rae Winkler MD based on my observations and the provider's statements to me.     Matt Murray  7/19/2019    EMERGENCY DEPARTMENT       Rae Estrada  MD Mary  07/19/19 0253

## 2019-07-19 NOTE — ED NOTES
"Pt able to ambulate throughout room with standby assist. Appearance of low but steady gait. Complaints of \"rocking\" sensation  "

## 2019-07-19 NOTE — ED TRIAGE NOTES
Pt was out on deck with  when she had a syncopal event and  was able to catch pt and lower her ground. LOC for 2-3 seconds.  concerned about sz no sz hx

## 2019-11-07 ENCOUNTER — HEALTH MAINTENANCE LETTER (OUTPATIENT)
Age: 34
End: 2019-11-07

## 2020-02-23 ENCOUNTER — HEALTH MAINTENANCE LETTER (OUTPATIENT)
Age: 35
End: 2020-02-23

## 2020-03-08 ENCOUNTER — APPOINTMENT (OUTPATIENT)
Dept: CT IMAGING | Facility: CLINIC | Age: 35
End: 2020-03-08
Attending: EMERGENCY MEDICINE

## 2020-03-08 ENCOUNTER — HOSPITAL ENCOUNTER (EMERGENCY)
Facility: CLINIC | Age: 35
Discharge: HOME | End: 2020-03-08
Attending: EMERGENCY MEDICINE | Admitting: EMERGENCY MEDICINE

## 2020-03-08 ENCOUNTER — MYC MEDICAL ADVICE (OUTPATIENT)
Dept: FAMILY MEDICINE | Facility: CLINIC | Age: 35
End: 2020-03-08

## 2020-03-08 VITALS
DIASTOLIC BLOOD PRESSURE: 83 MMHG | OXYGEN SATURATION: 93 % | WEIGHT: 120 LBS | HEART RATE: 119 BPM | TEMPERATURE: 98.7 F | RESPIRATION RATE: 18 BRPM | HEIGHT: 65 IN | BODY MASS INDEX: 19.99 KG/M2 | SYSTOLIC BLOOD PRESSURE: 110 MMHG

## 2020-03-08 DIAGNOSIS — N12 PYELONEPHRITIS: ICD-10-CM

## 2020-03-08 LAB
ALBUMIN UR-MCNC: 10 MG/DL
ANION GAP SERPL CALCULATED.3IONS-SCNC: 3 MMOL/L (ref 3–14)
APPEARANCE UR: CLEAR
BASOPHILS # BLD AUTO: 0 10E9/L (ref 0–0.2)
BASOPHILS NFR BLD AUTO: 0.5 %
BILIRUB UR QL STRIP: NEGATIVE
BUN SERPL-MCNC: 11 MG/DL (ref 7–30)
CALCIUM SERPL-MCNC: 9.1 MG/DL (ref 8.5–10.1)
CAOX CRY #/AREA URNS HPF: ABNORMAL /HPF
CHLORIDE SERPL-SCNC: 110 MMOL/L (ref 94–109)
CO2 SERPL-SCNC: 26 MMOL/L (ref 20–32)
COLOR UR AUTO: YELLOW
CREAT SERPL-MCNC: 0.79 MG/DL (ref 0.52–1.04)
DIFFERENTIAL METHOD BLD: NORMAL
EOSINOPHIL # BLD AUTO: 0.1 10E9/L (ref 0–0.7)
EOSINOPHIL NFR BLD AUTO: 1.2 %
ERYTHROCYTE [DISTWIDTH] IN BLOOD BY AUTOMATED COUNT: 12.8 % (ref 10–15)
GFR SERPL CREATININE-BSD FRML MDRD: >90 ML/MIN/{1.73_M2}
GLUCOSE SERPL-MCNC: 92 MG/DL (ref 70–99)
GLUCOSE UR STRIP-MCNC: NEGATIVE MG/DL
HCT VFR BLD AUTO: 38 % (ref 35–47)
HGB BLD-MCNC: 12.7 G/DL (ref 11.7–15.7)
HGB UR QL STRIP: NEGATIVE
IMM GRANULOCYTES # BLD: 0 10E9/L (ref 0–0.4)
IMM GRANULOCYTES NFR BLD: 0.2 %
KETONES UR STRIP-MCNC: NEGATIVE MG/DL
LEUKOCYTE ESTERASE UR QL STRIP: ABNORMAL
LYMPHOCYTES # BLD AUTO: 3.7 10E9/L (ref 0.8–5.3)
LYMPHOCYTES NFR BLD AUTO: 44.2 %
MCH RBC QN AUTO: 30.2 PG (ref 26.5–33)
MCHC RBC AUTO-ENTMCNC: 33.4 G/DL (ref 31.5–36.5)
MCV RBC AUTO: 91 FL (ref 78–100)
MONOCYTES # BLD AUTO: 0.4 10E9/L (ref 0–1.3)
MONOCYTES NFR BLD AUTO: 4.4 %
MUCOUS THREADS #/AREA URNS LPF: PRESENT /LPF
NEUTROPHILS # BLD AUTO: 4.2 10E9/L (ref 1.6–8.3)
NEUTROPHILS NFR BLD AUTO: 49.5 %
NITRATE UR QL: NEGATIVE
NRBC # BLD AUTO: 0 10*3/UL
NRBC BLD AUTO-RTO: 0 /100
PH UR STRIP: 6 PH (ref 5–7)
PLATELET # BLD AUTO: 324 10E9/L (ref 150–450)
POTASSIUM SERPL-SCNC: 3.7 MMOL/L (ref 3.4–5.3)
RBC # BLD AUTO: 4.2 10E12/L (ref 3.8–5.2)
RBC #/AREA URNS AUTO: 4 /HPF (ref 0–2)
SODIUM SERPL-SCNC: 139 MMOL/L (ref 133–144)
SOURCE: ABNORMAL
SP GR UR STRIP: 1.03 (ref 1–1.03)
SQUAMOUS #/AREA URNS AUTO: 4 /HPF (ref 0–1)
UROBILINOGEN UR STRIP-MCNC: 2 MG/DL (ref 0–2)
WBC # BLD AUTO: 8.4 10E9/L (ref 4–11)
WBC #/AREA URNS AUTO: 13 /HPF (ref 0–5)

## 2020-03-08 PROCEDURE — 25800030 ZZH RX IP 258 OP 636: Performed by: EMERGENCY MEDICINE

## 2020-03-08 PROCEDURE — 96365 THER/PROPH/DIAG IV INF INIT: CPT

## 2020-03-08 PROCEDURE — 80048 BASIC METABOLIC PNL TOTAL CA: CPT | Performed by: EMERGENCY MEDICINE

## 2020-03-08 PROCEDURE — 81001 URINALYSIS AUTO W/SCOPE: CPT | Performed by: EMERGENCY MEDICINE

## 2020-03-08 PROCEDURE — 25000128 H RX IP 250 OP 636: Performed by: EMERGENCY MEDICINE

## 2020-03-08 PROCEDURE — 85025 COMPLETE CBC W/AUTO DIFF WBC: CPT | Performed by: EMERGENCY MEDICINE

## 2020-03-08 PROCEDURE — 87086 URINE CULTURE/COLONY COUNT: CPT | Performed by: EMERGENCY MEDICINE

## 2020-03-08 PROCEDURE — 96375 TX/PRO/DX INJ NEW DRUG ADDON: CPT

## 2020-03-08 PROCEDURE — 74176 CT ABD & PELVIS W/O CONTRAST: CPT

## 2020-03-08 PROCEDURE — 99285 EMERGENCY DEPT VISIT HI MDM: CPT | Mod: 25

## 2020-03-08 PROCEDURE — 96361 HYDRATE IV INFUSION ADD-ON: CPT

## 2020-03-08 RX ORDER — CEFTRIAXONE 1 G/1
1 INJECTION, POWDER, FOR SOLUTION INTRAMUSCULAR; INTRAVENOUS ONCE
Status: COMPLETED | OUTPATIENT
Start: 2020-03-08 | End: 2020-03-08

## 2020-03-08 RX ORDER — OXYCODONE HYDROCHLORIDE 5 MG/1
5 TABLET ORAL EVERY 6 HOURS PRN
Qty: 5 TABLET | Refills: 0 | Status: SHIPPED | OUTPATIENT
Start: 2020-03-08 | End: 2020-03-08

## 2020-03-08 RX ORDER — PHENAZOPYRIDINE HYDROCHLORIDE 200 MG/1
200 TABLET, FILM COATED ORAL 3 TIMES DAILY PRN
Qty: 21 TABLET | Refills: 0 | Status: ON HOLD | OUTPATIENT
Start: 2020-03-08 | End: 2020-04-19

## 2020-03-08 RX ORDER — DIMENHYDRINATE 50 MG
50 TABLET ORAL ONCE
Status: DISCONTINUED | OUTPATIENT
Start: 2020-03-08 | End: 2020-03-08 | Stop reason: HOSPADM

## 2020-03-08 RX ORDER — HYDROMORPHONE HYDROCHLORIDE 1 MG/ML
0.5 INJECTION, SOLUTION INTRAMUSCULAR; INTRAVENOUS; SUBCUTANEOUS ONCE
Status: COMPLETED | OUTPATIENT
Start: 2020-03-08 | End: 2020-03-08

## 2020-03-08 RX ORDER — CIPROFLOXACIN 500 MG/1
500 TABLET, FILM COATED ORAL 2 TIMES DAILY
Qty: 14 TABLET | Refills: 0 | Status: SHIPPED | OUTPATIENT
Start: 2020-03-08 | End: 2020-03-08

## 2020-03-08 RX ORDER — ONDANSETRON 2 MG/ML
4 INJECTION INTRAMUSCULAR; INTRAVENOUS ONCE
Status: COMPLETED | OUTPATIENT
Start: 2020-03-08 | End: 2020-03-08

## 2020-03-08 RX ORDER — PHENAZOPYRIDINE HYDROCHLORIDE 200 MG/1
200 TABLET, FILM COATED ORAL 3 TIMES DAILY PRN
Qty: 21 TABLET | Refills: 0 | Status: SHIPPED | OUTPATIENT
Start: 2020-03-08 | End: 2020-03-08

## 2020-03-08 RX ORDER — FLUCONAZOLE 150 MG/1
150 TABLET ORAL ONCE
Qty: 1 TABLET | Refills: 0 | Status: SHIPPED | OUTPATIENT
Start: 2020-03-08 | End: 2020-04-19

## 2020-03-08 RX ORDER — ONDANSETRON 4 MG/1
4-8 TABLET, ORALLY DISINTEGRATING ORAL EVERY 8 HOURS PRN
Qty: 12 TABLET | Refills: 0 | Status: ON HOLD | OUTPATIENT
Start: 2020-03-08 | End: 2020-04-19

## 2020-03-08 RX ORDER — ONDANSETRON 4 MG/1
4-8 TABLET, ORALLY DISINTEGRATING ORAL EVERY 8 HOURS PRN
Qty: 12 TABLET | Refills: 0 | Status: SHIPPED | OUTPATIENT
Start: 2020-03-08 | End: 2020-03-08

## 2020-03-08 RX ORDER — FLUCONAZOLE 150 MG/1
150 TABLET ORAL ONCE
Qty: 1 TABLET | Refills: 0 | Status: SHIPPED | OUTPATIENT
Start: 2020-03-08 | End: 2020-03-08

## 2020-03-08 RX ORDER — CIPROFLOXACIN 500 MG/1
500 TABLET, FILM COATED ORAL 2 TIMES DAILY
Qty: 14 TABLET | Refills: 0 | Status: SHIPPED | OUTPATIENT
Start: 2020-03-08 | End: 2020-04-19

## 2020-03-08 RX ORDER — OXYCODONE HYDROCHLORIDE 5 MG/1
5 TABLET ORAL EVERY 6 HOURS PRN
Qty: 5 TABLET | Refills: 0 | Status: ON HOLD | OUTPATIENT
Start: 2020-03-08 | End: 2020-04-19

## 2020-03-08 RX ADMIN — HYDROMORPHONE HYDROCHLORIDE 0.5 MG: 1 INJECTION, SOLUTION INTRAMUSCULAR; INTRAVENOUS; SUBCUTANEOUS at 18:10

## 2020-03-08 RX ADMIN — ONDANSETRON 4 MG: 2 INJECTION INTRAMUSCULAR; INTRAVENOUS at 18:07

## 2020-03-08 RX ADMIN — CEFTRIAXONE SODIUM 1 G: 1 INJECTION, POWDER, FOR SOLUTION INTRAMUSCULAR; INTRAVENOUS at 19:19

## 2020-03-08 RX ADMIN — SODIUM CHLORIDE 1000 ML: 9 INJECTION, SOLUTION INTRAVENOUS at 18:07

## 2020-03-08 ASSESSMENT — ENCOUNTER SYMPTOMS
FEVER: 1
CHEST TIGHTNESS: 0
VOMITING: 1
DIARRHEA: 0
DYSURIA: 1
SHORTNESS OF BREATH: 0
NAUSEA: 1
FLANK PAIN: 1
COUGH: 0

## 2020-03-08 ASSESSMENT — MIFFLIN-ST. JEOR: SCORE: 1245.2

## 2020-03-08 NOTE — ED TRIAGE NOTES
"Patient having bilateral flank pain since yesterday.  Patient has hx of kidney stones and she feels that is what is going on right now.  Patient stating that the pain is \"so much worse today than yesterday\" and that she is vomiting that started today.  "

## 2020-03-08 NOTE — ED PROVIDER NOTES
History     Chief Complaint:  Flank Pain    The history is provided by the patient.      Julia Smith is a 35 year old female with a history of kidney stones who presents to the emergency department for evaluation of flank pain. The patient reports experiencing bilateral flank pain which began yesterday 3/7/2020 and increased this morning with a new onset of nausea and vomiting, prompting her visit to the ED today 3/8/2020. She reports that her flank pain is slightly worse on her left side. The patient reports dysuria and a subjective fever accompanying her symptoms. The patient states that she took Tylenol around 1600 for pain without relief. She denies taking any medication for her nausea today.  The patient denies experiencing diarrhea or respiratory problems. She also denies concerns for pregnancy, noting that she had a total hysterectomy with bilateral salpingo-oophorectomy in the past. The patient states that she is not taking estrogen. Of note, the patient reports that the last time she used narcotics was during a visit to [facility] ED on 08/31/2018 to treat pain for a kidney stone.    Allergies:  Decadron  Depakote  Ibuprofen  Tramadol  Valproic acid     Medications:    Amitriptyline  Adderall  Clonazepam  Diazepam  Estradiol  Gabapentin  Atarax  Lamotrigine  Nicotrol  Phenergan  Sertraline     Past Medical History:    Fibromyalgia  PCOS  Constipation  Anxiety  Endometriosis  Pain in joint  Chronic pain disorder  Depression  Glucose intolerance  Adopted  Tobacco abuse  Proteinuria  Excessive daytime sleepiness  Syncope  GERD  Seroma  Agoraphobia  Migraine  TMJ  PTSD    Past Surgical History:    Ablation and excision of endometriosis  Total hysterectomy  Bilateral Oophorectomy  Salpingectomy  Appendectomy    Family History:    Adopted  Depression    Social History:  Presents alone  Current every day smoker  Alcohol use  Drug use. Marijuana  Marital Status:   [2]     Review of Systems  "  Constitutional: Positive for fever (subjective).   Respiratory: Negative for cough, chest tightness and shortness of breath.    Gastrointestinal: Positive for nausea and vomiting. Negative for diarrhea.   Genitourinary: Positive for dysuria and flank pain.   All other systems reviewed and are negative.    Physical Exam     Patient Vitals for the past 24 hrs:   BP Temp Temp src Pulse Resp SpO2 Height Weight   03/08/20 1739 110/83 98.7  F (37.1  C) Oral 119 18 93 % 1.651 m (5' 5\") 54.4 kg (120 lb)       Physical Exam  Eyes:               Sclera white; Pupils are equal and round  ENT:                External ears and nares normal  CV:                  Rate as above with regular rhythm   Resp:               Breath sounds clear and equal bilaterally                          Non-labored, no retractions or accessory muscle use  GI:                   Abdomen is soft, non-tender, non-distended                          No rebound tenderness or peritoneal features  :                  L CVA tenderness; R mild CVA tenderness  MS:                  Moves all extremities  Skin:                Warm and dry  Neuro:             Speech is normal and fluent. No apparent deficit.    Emergency Department Course     Imaging:  Radiology findings were communicated with the patient who voiced understanding of the findings.    CT ABDOMEN AND PELVIS WITHOUT CONTRAST  IMPRESSION:  1. No radiodense kidney stones or hydronephrosis in either kidney.  2. Moderate amount of stool in the colon, nonspecific, can be seen  with constipation.  As per radiology.     Laboratory:  Laboratory findings were communicated with the patient who voiced understanding of the findings.    CBC: WBC: 8.4, HGB: 12.7, PLT: 324   BMP: Glucose 92, Chloride 110 (H), o/w WNL (Creatinine: 0.79)     UA with micro: Albumin 10, Esterase large, WBC/HPF 13 (H), RBC/HPF 4 (H), mucous present, oxalate few o/w negative     Urine cultures: pending     Interventions:    1807 NS 1L " IV   1807 Zofran 4 mg IV   1810 Dilaudid 0.5 mg IV  1919 Rocephin 1 g IV     Emergency Department Course:  Past medical records, nursing notes, and vitals reviewed.    1745 I performed an exam of the patient as documented above.     IV was inserted and blood was drawn for laboratory testing, results above.    The patient provided a urine sample here in the emergency department. This was sent for laboratory testing, findings above.    The patient was sent for a CT abdomen and pelvis while in the emergency department, results above.     1909 I rechecked the patient and discussed the results of her workup thus far.     Findings and plan explained to the Patient. Patient discharged home with instructions regarding supportive care, medications, and reasons to return. The importance of close follow-up was reviewed. The patient was prescribed ciprofloxacin, Diflucan  Zofran, and Pyridium.    I personally reviewed the laboratory and imaging results with the Patient and answered all related questions prior to discharge.     Impression & Plan     Medical Decision Making:  Julia Smith is a 35 year old female for evaluation of bilateral flank pain, left worse than right. On chart review she has a history of CT confirmed kidney stones with most recent scan being august of 2018 and differential that includes stone, pyelonephritis intraabdominal infection amongst others. There is no concern for pregnancy due to past surgery. CT was obtained with no evidence of recurrent stone. UA and her dysuria are suggestive of pyleonephritis.  Previous care plan several years ago noted but no recent inappropriate use of EDs.  She was given IV antibiotics here and will be discharged with ciprofloxacin. Urine culture is pending, and she will get a call if this shows resistance and requires a change in antibiotics. Do not take NSAIDS and short course of pain medication was prescribed.    Diagnosis:    ICD-10-CM    1. Pyelonephritis  N12 Urine  Culture       Disposition:  Discharged to home.    Discharge Medications:  Current Discharge Medication List      START taking these medications    Details   ciprofloxacin (CIPRO) 500 MG tablet Take 1 tablet (500 mg) by mouth 2 times daily  Qty: 14 tablet, Refills: 0      fluconazole (DIFLUCAN) 150 MG tablet Take 1 tablet (150 mg) by mouth once for 1 dose  Qty: 1 tablet, Refills: 0      ondansetron (ZOFRAN ODT) 4 MG ODT tab Take 1-2 tablets (4-8 mg) by mouth every 8 hours as needed for nausea  Qty: 12 tablet, Refills: 0      phenazopyridine (PYRIDIUM) 200 MG tablet Take 1 tablet (200 mg) by mouth 3 times daily as needed for pain  Qty: 21 tablet, Refills: 0             Scribe Disclosure:  I, Javier Hemphill, am serving as a scribe at 5:45 PM on 3/8/2020 to document services personally performed by Renetta Luna MD based on my observations and the provider's statements to me.         Renetta Luna MD  03/08/20 2024

## 2020-03-08 NOTE — ED AVS SNAPSHOT
Emergency Department  [address] [facility] [address]  Phone:  [phone]  Fax:  [phone]                                    Julia Smith   MRN: [id number]    Department:   Emergency Department   Date of Visit:  3/8/2020           After Visit Summary Signature Page    I have received my discharge instructions, and my questions have been answered. I have discussed any challenges I see with this plan with the nurse or doctor.    ..........................................................................................................................................  Patient/Patient Representative Signature      ..........................................................................................................................................  Patient Representative Print Name and Relationship to Patient    ..................................................               ................................................  Date                                   Time    ..........................................................................................................................................  Reviewed by Signature/Title    ...................................................              ..............................................  Date                                               Time          22EPIC Rev 08/18

## 2020-03-09 LAB
BACTERIA SPEC CULT: NO GROWTH
Lab: NORMAL
SPECIMEN SOURCE: NORMAL

## 2020-03-09 NOTE — TELEPHONE ENCOUNTER
Please see [employer] message below and advise.   Inessa Moeller RN   [facility] - Triage

## 2020-03-10 ENCOUNTER — TELEPHONE (OUTPATIENT)
Dept: EMERGENCY MEDICINE | Facility: CLINIC | Age: 35
End: 2020-03-10

## 2020-03-10 NOTE — TELEPHONE ENCOUNTER
"[facility] Emergency Department Lab result notification:    [place] ED lab result protocol used  Urine Culture protocol    Reason for call  Notify of lab results, assess symptoms,  review ED providers recommendations/discharge instructions (if necessary) and advise per ED lab result f/u protocol    Lab Result   Final urine culture report shows \"NO GROWTH\" and is NEGATIVE.   Emergency Dept discharge antibiotic: Ciprofloxacin (Cipro) 500 mg tablet, 1 tablet (500 mg) by mouth 2 times daily for 7 days.   Is ED discharge Rx antibiotic for UTI only (Yes/No): Yes   Recommendations per [place] ED Lab result protocol - Urine culture protocol.     Information table from ED Provider visit on 3/8/20  Symptoms reported at ED visit (Chief complaint, HPI) Julia Smith is a 35 year old female with a history of kidney stones who presents to the emergency department for evaluation of flank pain. The patient reports experiencing bilateral flank pain which began yesterday 3/7/2020 and increased this morning with a new onset of nausea and vomiting, prompting her visit to the ED today 3/8/2020. She reports that her flank pain is slightly worse on her left side. The patient reports dysuria and a subjective fever accompanying her symptoms. The patient states that she took Tylenol around 1600 for pain without relief. She denies taking any medication for her nausea today.  The patient denies experiencing diarrhea or respiratory problems. She also denies concerns for pregnancy, noting that she had a total hysterectomy with bilateral salpingo-oophorectomy in the past. The patient states that she is not taking estrogen. Of note, the patient reports that the last time she used narcotics was during a visit to [facility]'s ED on 08/31/2018 to treat pain for a kidney stone.   ED providers Impression and Plan (applicable information) Julia Smith is a 35 year old female for evaluation of bilateral flank pain, left worse than " right. On chart review she has a history of CT confirmed kidney stones with most recent scan being august of 2018 and differential that includes stone, pyelonephritis intraabdominal infection amongst others. There is no concern for pregnancy due to past surgery. CT was obtained with no evidence of recurrent stone. UA and her dysuria are suggestive of pyleonephritis.  Previous care plan several years ago noted but no recent inappropriate use of EDs.  She was given IV antibiotics here and will be discharged with ciprofloxacin. Urine culture is pending, and she will get a call if this shows resistance and requires a change in antibiotics. Do not take NSAIDS and short course of pain medication was prescribed.   Miscellaneous information N/A     RN Assessment (Patient s current Symptoms), include time called.  [Insert Left message here if message left]  Julia advised of result.  Not on abx previous to ED visit, no stones per CT scan.   Questions answered.    RN Recommendations/Instructions per [facility] ED lab result protocol  As per protocol, okay to stop abx for No Growth UC or per PCP's recommendation.     Please Contact your PCP clinic or return to the Emergency department if your:    Symptoms return.    Symptoms worsen or other concerning symptom's.    PCP follow-up Questions asked: YES       Johanny Nguyen RN    [email or web address] [facility]   Lung Nodule and ED Lab Results F/U RN  Epic pool (ED late result f/u RN) : P [id number]   # [phone]    Copy of Lab result   Order   Urine Culture [[id number]] (Order [id number])   Order Requisition     Urine Culture (Order #[id number]) on 3/8/20    Exam Information     Exam Date  Exam Time  Accession #  Results     3/8/20   6:04 PM       Specimen Information:  [facility] Urine          Component  Collected  Lab    Specimen Description  03/08/2020  6:04 PM  225    [facility] Urine    Special Requests  03/08/2020  6:04 PM  225    Specimen received in preservative    Culture  Micro  03/08/2020  6:04 PM  225    No growth

## 2020-03-22 ENCOUNTER — MYC MEDICAL ADVICE (OUTPATIENT)
Dept: FAMILY MEDICINE | Facility: CLINIC | Age: 35
End: 2020-03-22

## 2020-03-22 NOTE — TELEPHONE ENCOUNTER
Please see [employer] message and advise.      Thank you,  Tori GROVERN BSN  [facility]  [phone]

## 2020-04-19 ENCOUNTER — APPOINTMENT (OUTPATIENT)
Dept: GENERAL RADIOLOGY | Facility: CLINIC | Age: 35
End: 2020-04-19
Attending: EMERGENCY MEDICINE
Payer: COMMERCIAL

## 2020-04-19 ENCOUNTER — HOSPITAL ENCOUNTER (INPATIENT)
Facility: CLINIC | Age: 35
LOS: 3 days | Discharge: HOME | End: 2020-04-22
Attending: EMERGENCY MEDICINE | Admitting: HOSPITALIST
Payer: COMMERCIAL

## 2020-04-19 ENCOUNTER — NURSE TRIAGE (OUTPATIENT)
Dept: NURSING | Facility: CLINIC | Age: 35
End: 2020-04-19

## 2020-04-19 DIAGNOSIS — A41.9 ACUTE SEPSIS (H): ICD-10-CM

## 2020-04-19 DIAGNOSIS — R10.2 PELVIC PAIN IN FEMALE: Primary | ICD-10-CM

## 2020-04-19 DIAGNOSIS — N10 ACUTE PYELONEPHRITIS: ICD-10-CM

## 2020-04-19 PROBLEM — N12 PYELONEPHRITIS: Status: ACTIVE | Noted: 2020-04-19

## 2020-04-19 LAB
ALBUMIN SERPL-MCNC: 4 G/DL (ref 3.4–5)
ALBUMIN UR-MCNC: 30 MG/DL
ALP SERPL-CCNC: 77 U/L (ref 40–150)
ALT SERPL W P-5'-P-CCNC: 19 U/L (ref 0–50)
ANION GAP SERPL CALCULATED.3IONS-SCNC: 6 MMOL/L (ref 3–14)
APPEARANCE UR: ABNORMAL
AST SERPL W P-5'-P-CCNC: 12 U/L (ref 0–45)
BASOPHILS # BLD AUTO: 0 10E9/L (ref 0–0.2)
BASOPHILS NFR BLD AUTO: 0.5 %
BILIRUB SERPL-MCNC: 0.2 MG/DL (ref 0.2–1.3)
BILIRUB UR QL STRIP: NEGATIVE
BUN SERPL-MCNC: 14 MG/DL (ref 7–30)
CALCIUM SERPL-MCNC: 8.5 MG/DL (ref 8.5–10.1)
CAOX CRY #/AREA URNS HPF: ABNORMAL /HPF
CHLORIDE SERPL-SCNC: 106 MMOL/L (ref 94–109)
CO2 SERPL-SCNC: 28 MMOL/L (ref 20–32)
COLOR UR AUTO: YELLOW
CREAT SERPL-MCNC: 0.67 MG/DL (ref 0.52–1.04)
D DIMER PPP FEU-MCNC: <0.3 UG/ML FEU (ref 0–0.5)
DEPRECATED S PYO AG THROAT QL EIA: NEGATIVE
DIFFERENTIAL METHOD BLD: NORMAL
EOSINOPHIL # BLD AUTO: 0.1 10E9/L (ref 0–0.7)
EOSINOPHIL NFR BLD AUTO: 1.4 %
ERYTHROCYTE [DISTWIDTH] IN BLOOD BY AUTOMATED COUNT: 12.8 % (ref 10–15)
GFR SERPL CREATININE-BSD FRML MDRD: >90 ML/MIN/{1.73_M2}
GLUCOSE SERPL-MCNC: 112 MG/DL (ref 70–99)
GLUCOSE UR STRIP-MCNC: NEGATIVE MG/DL
HCG UR QL: NEGATIVE
HCT VFR BLD AUTO: 37.8 % (ref 35–47)
HGB BLD-MCNC: 12.5 G/DL (ref 11.7–15.7)
HGB UR QL STRIP: NEGATIVE
IMM GRANULOCYTES # BLD: 0 10E9/L (ref 0–0.4)
IMM GRANULOCYTES NFR BLD: 0.3 %
INTERPRETATION ECG - MUSE: NORMAL
KETONES UR STRIP-MCNC: 10 MG/DL
LACTATE BLD-SCNC: 2 MMOL/L (ref 0.7–2)
LACTATE BLD-SCNC: 2.4 MMOL/L (ref 0.7–2)
LEUKOCYTE ESTERASE UR QL STRIP: ABNORMAL
LYMPHOCYTES # BLD AUTO: 3.7 10E9/L (ref 0.8–5.3)
LYMPHOCYTES NFR BLD AUTO: 42.5 %
MAGNESIUM SERPL-MCNC: 3.1 MG/DL (ref 1.6–2.3)
MCH RBC QN AUTO: 30.3 PG (ref 26.5–33)
MCHC RBC AUTO-ENTMCNC: 33.1 G/DL (ref 31.5–36.5)
MCV RBC AUTO: 92 FL (ref 78–100)
MONOCYTES # BLD AUTO: 0.6 10E9/L (ref 0–1.3)
MONOCYTES NFR BLD AUTO: 6.5 %
MUCOUS THREADS #/AREA URNS LPF: PRESENT /LPF
NEUTROPHILS # BLD AUTO: 4.3 10E9/L (ref 1.6–8.3)
NEUTROPHILS NFR BLD AUTO: 48.8 %
NITRATE UR QL: NEGATIVE
NRBC # BLD AUTO: 0 10*3/UL
NRBC BLD AUTO-RTO: 0 /100
PH UR STRIP: 6.5 PH (ref 5–7)
PLATELET # BLD AUTO: 314 10E9/L (ref 150–450)
POTASSIUM SERPL-SCNC: 2.9 MMOL/L (ref 3.4–5.3)
POTASSIUM SERPL-SCNC: 3.5 MMOL/L (ref 3.4–5.3)
PROT SERPL-MCNC: 7.2 G/DL (ref 6.8–8.8)
RBC # BLD AUTO: 4.13 10E12/L (ref 3.8–5.2)
RBC #/AREA URNS AUTO: 3 /HPF (ref 0–2)
SODIUM SERPL-SCNC: 140 MMOL/L (ref 133–144)
SOURCE: ABNORMAL
SP GR UR STRIP: 1.02 (ref 1–1.03)
SPECIMEN SOURCE: NORMAL
SPECIMEN SOURCE: NORMAL
SQUAMOUS #/AREA URNS AUTO: 3 /HPF (ref 0–1)
STREP GROUP A PCR: NOT DETECTED
TRANS CELLS #/AREA URNS HPF: 1 /HPF (ref 0–1)
TROPONIN I SERPL-MCNC: <0.015 UG/L (ref 0–0.04)
UROBILINOGEN UR STRIP-MCNC: NORMAL MG/DL (ref 0–2)
WBC # BLD AUTO: 8.7 10E9/L (ref 4–11)
WBC #/AREA URNS AUTO: 104 /HPF (ref 0–5)

## 2020-04-19 PROCEDURE — 83735 ASSAY OF MAGNESIUM: CPT | Performed by: HOSPITALIST

## 2020-04-19 PROCEDURE — 81001 URINALYSIS AUTO W/SCOPE: CPT | Performed by: EMERGENCY MEDICINE

## 2020-04-19 PROCEDURE — 71045 X-RAY EXAM CHEST 1 VIEW: CPT

## 2020-04-19 PROCEDURE — 87040 BLOOD CULTURE FOR BACTERIA: CPT | Performed by: EMERGENCY MEDICINE

## 2020-04-19 PROCEDURE — 93005 ELECTROCARDIOGRAM TRACING: CPT

## 2020-04-19 PROCEDURE — 81025 URINE PREGNANCY TEST: CPT | Performed by: EMERGENCY MEDICINE

## 2020-04-19 PROCEDURE — 99285 EMERGENCY DEPT VISIT HI MDM: CPT | Mod: 25

## 2020-04-19 PROCEDURE — 25800030 ZZH RX IP 258 OP 636: Performed by: HOSPITALIST

## 2020-04-19 PROCEDURE — 87086 URINE CULTURE/COLONY COUNT: CPT | Performed by: EMERGENCY MEDICINE

## 2020-04-19 PROCEDURE — 99223 1ST HOSP IP/OBS HIGH 75: CPT | Mod: AI | Performed by: HOSPITALIST

## 2020-04-19 PROCEDURE — 84132 ASSAY OF SERUM POTASSIUM: CPT | Performed by: EMERGENCY MEDICINE

## 2020-04-19 PROCEDURE — 96375 TX/PRO/DX INJ NEW DRUG ADDON: CPT

## 2020-04-19 PROCEDURE — 85025 COMPLETE CBC W/AUTO DIFF WBC: CPT | Performed by: EMERGENCY MEDICINE

## 2020-04-19 PROCEDURE — 40001204 ZZHCL STATISTIC STREP A RAPID: Performed by: EMERGENCY MEDICINE

## 2020-04-19 PROCEDURE — 25000132 ZZH RX MED GY IP 250 OP 250 PS 637: Performed by: EMERGENCY MEDICINE

## 2020-04-19 PROCEDURE — 80053 COMPREHEN METABOLIC PANEL: CPT | Performed by: EMERGENCY MEDICINE

## 2020-04-19 PROCEDURE — 83605 ASSAY OF LACTIC ACID: CPT | Performed by: EMERGENCY MEDICINE

## 2020-04-19 PROCEDURE — 96361 HYDRATE IV INFUSION ADD-ON: CPT

## 2020-04-19 PROCEDURE — 25000132 ZZH RX MED GY IP 250 OP 250 PS 637: Performed by: HOSPITALIST

## 2020-04-19 PROCEDURE — 85379 FIBRIN DEGRADATION QUANT: CPT | Performed by: EMERGENCY MEDICINE

## 2020-04-19 PROCEDURE — 96365 THER/PROPH/DIAG IV INF INIT: CPT

## 2020-04-19 PROCEDURE — 96368 THER/DIAG CONCURRENT INF: CPT

## 2020-04-19 PROCEDURE — 87635 SARS-COV-2 COVID-19 AMP PRB: CPT | Performed by: EMERGENCY MEDICINE

## 2020-04-19 PROCEDURE — 12000011 ZZH R&B MS OVERFLOW

## 2020-04-19 PROCEDURE — 84484 ASSAY OF TROPONIN QUANT: CPT | Performed by: EMERGENCY MEDICINE

## 2020-04-19 PROCEDURE — 25000128 H RX IP 250 OP 636: Performed by: EMERGENCY MEDICINE

## 2020-04-19 PROCEDURE — 87651 STREP A DNA AMP PROBE: CPT | Performed by: EMERGENCY MEDICINE

## 2020-04-19 PROCEDURE — 25800030 ZZH RX IP 258 OP 636: Performed by: EMERGENCY MEDICINE

## 2020-04-19 RX ORDER — DEXTROAMPHETAMINE SACCHARATE, AMPHETAMINE ASPARTATE MONOHYDRATE, DEXTROAMPHETAMINE SULFATE AND AMPHETAMINE SULFATE 5; 5; 5; 5 MG/1; MG/1; MG/1; MG/1
20 CAPSULE, EXTENDED RELEASE ORAL EVERY MORNING
COMMUNITY

## 2020-04-19 RX ORDER — HYDROMORPHONE HYDROCHLORIDE 1 MG/ML
0.5 INJECTION, SOLUTION INTRAMUSCULAR; INTRAVENOUS; SUBCUTANEOUS ONCE
Status: COMPLETED | OUTPATIENT
Start: 2020-04-19 | End: 2020-04-19

## 2020-04-19 RX ORDER — AMOXICILLIN 250 MG
1 CAPSULE ORAL 2 TIMES DAILY PRN
Status: DISCONTINUED | OUTPATIENT
Start: 2020-04-19 | End: 2020-04-22 | Stop reason: HOSPADM

## 2020-04-19 RX ORDER — POTASSIUM CHLORIDE 1.5 G/1.58G
40 POWDER, FOR SOLUTION ORAL ONCE
Status: COMPLETED | OUTPATIENT
Start: 2020-04-19 | End: 2020-04-19

## 2020-04-19 RX ORDER — SERTRALINE HYDROCHLORIDE 100 MG/1
200 TABLET, FILM COATED ORAL EVERY MORNING
COMMUNITY

## 2020-04-19 RX ORDER — AMOXICILLIN 250 MG
2 CAPSULE ORAL 2 TIMES DAILY PRN
Status: DISCONTINUED | OUTPATIENT
Start: 2020-04-19 | End: 2020-04-22 | Stop reason: HOSPADM

## 2020-04-19 RX ORDER — LAMOTRIGINE 150 MG/1
150 TABLET ORAL EVERY EVENING
Status: DISCONTINUED | OUTPATIENT
Start: 2020-04-19 | End: 2020-04-22 | Stop reason: HOSPADM

## 2020-04-19 RX ORDER — CEFTRIAXONE 1 G/1
1 INJECTION, POWDER, FOR SOLUTION INTRAMUSCULAR; INTRAVENOUS EVERY 24 HOURS
Status: DISCONTINUED | OUTPATIENT
Start: 2020-04-20 | End: 2020-04-21

## 2020-04-19 RX ORDER — OXYCODONE HYDROCHLORIDE 5 MG/1
5-10 TABLET ORAL
Status: DISCONTINUED | OUTPATIENT
Start: 2020-04-19 | End: 2020-04-22 | Stop reason: HOSPADM

## 2020-04-19 RX ORDER — ONDANSETRON 4 MG/1
4 TABLET, ORALLY DISINTEGRATING ORAL EVERY 6 HOURS PRN
Status: DISCONTINUED | OUTPATIENT
Start: 2020-04-19 | End: 2020-04-22 | Stop reason: HOSPADM

## 2020-04-19 RX ORDER — GABAPENTIN 400 MG/1
400 CAPSULE ORAL 3 TIMES DAILY
COMMUNITY

## 2020-04-19 RX ORDER — SODIUM CHLORIDE 9 MG/ML
INJECTION, SOLUTION INTRAVENOUS CONTINUOUS
Status: DISCONTINUED | OUTPATIENT
Start: 2020-04-19 | End: 2020-04-21

## 2020-04-19 RX ORDER — ACETAMINOPHEN 325 MG/1
650 TABLET ORAL ONCE
Status: DISCONTINUED | OUTPATIENT
Start: 2020-04-19 | End: 2020-04-19

## 2020-04-19 RX ORDER — ONDANSETRON 2 MG/ML
4 INJECTION INTRAMUSCULAR; INTRAVENOUS EVERY 6 HOURS PRN
Status: DISCONTINUED | OUTPATIENT
Start: 2020-04-19 | End: 2020-04-22 | Stop reason: HOSPADM

## 2020-04-19 RX ORDER — POTASSIUM CHLORIDE 1500 MG/1
20-40 TABLET, EXTENDED RELEASE ORAL
Status: DISCONTINUED | OUTPATIENT
Start: 2020-04-19 | End: 2020-04-22 | Stop reason: HOSPADM

## 2020-04-19 RX ORDER — ACETAMINOPHEN 650 MG/1
650 SUPPOSITORY RECTAL EVERY 4 HOURS PRN
Status: DISCONTINUED | OUTPATIENT
Start: 2020-04-19 | End: 2020-04-22 | Stop reason: HOSPADM

## 2020-04-19 RX ORDER — MORPHINE SULFATE 4 MG/ML
4 INJECTION, SOLUTION INTRAMUSCULAR; INTRAVENOUS ONCE
Status: COMPLETED | OUTPATIENT
Start: 2020-04-19 | End: 2020-04-19

## 2020-04-19 RX ORDER — ACETAMINOPHEN 325 MG/1
650 TABLET ORAL ONCE
Status: COMPLETED | OUTPATIENT
Start: 2020-04-19 | End: 2020-04-19

## 2020-04-19 RX ORDER — POTASSIUM CHLORIDE 29.8 MG/ML
20 INJECTION INTRAVENOUS
Status: DISCONTINUED | OUTPATIENT
Start: 2020-04-19 | End: 2020-04-22 | Stop reason: HOSPADM

## 2020-04-19 RX ORDER — POTASSIUM CHLORIDE 7.45 MG/ML
10 INJECTION INTRAVENOUS
Status: DISCONTINUED | OUTPATIENT
Start: 2020-04-19 | End: 2020-04-22 | Stop reason: HOSPADM

## 2020-04-19 RX ORDER — ACETAMINOPHEN 325 MG/1
650 TABLET ORAL EVERY 4 HOURS PRN
Status: DISCONTINUED | OUTPATIENT
Start: 2020-04-19 | End: 2020-04-22 | Stop reason: HOSPADM

## 2020-04-19 RX ORDER — LAMOTRIGINE 100 MG/1
100 TABLET ORAL EVERY MORNING
Status: DISCONTINUED | OUTPATIENT
Start: 2020-04-20 | End: 2020-04-22 | Stop reason: HOSPADM

## 2020-04-19 RX ORDER — NALOXONE HYDROCHLORIDE 0.4 MG/ML
.1-.4 INJECTION, SOLUTION INTRAMUSCULAR; INTRAVENOUS; SUBCUTANEOUS
Status: DISCONTINUED | OUTPATIENT
Start: 2020-04-19 | End: 2020-04-22 | Stop reason: HOSPADM

## 2020-04-19 RX ORDER — HYDROMORPHONE HYDROCHLORIDE 1 MG/ML
0.5 INJECTION, SOLUTION INTRAMUSCULAR; INTRAVENOUS; SUBCUTANEOUS ONCE
Status: DISCONTINUED | OUTPATIENT
Start: 2020-04-19 | End: 2020-04-19 | Stop reason: ALTCHOICE

## 2020-04-19 RX ORDER — ACETAMINOPHEN 500 MG
1000 TABLET ORAL EVERY 6 HOURS PRN
Status: ON HOLD | COMMUNITY
End: 2020-04-22

## 2020-04-19 RX ORDER — GABAPENTIN 400 MG/1
400 CAPSULE ORAL 3 TIMES DAILY
Status: DISCONTINUED | OUTPATIENT
Start: 2020-04-20 | End: 2020-04-19

## 2020-04-19 RX ORDER — LAMOTRIGINE 100 MG/1
150 TABLET ORAL EVERY EVENING
COMMUNITY

## 2020-04-19 RX ORDER — POLYETHYLENE GLYCOL 3350 17 G/17G
17 POWDER, FOR SOLUTION ORAL DAILY PRN
Status: DISCONTINUED | OUTPATIENT
Start: 2020-04-19 | End: 2020-04-19

## 2020-04-19 RX ORDER — MAGNESIUM SULFATE HEPTAHYDRATE 40 MG/ML
4 INJECTION, SOLUTION INTRAVENOUS EVERY 4 HOURS PRN
Status: DISCONTINUED | OUTPATIENT
Start: 2020-04-19 | End: 2020-04-22 | Stop reason: HOSPADM

## 2020-04-19 RX ORDER — LIDOCAINE 40 MG/G
CREAM TOPICAL
Status: DISCONTINUED | OUTPATIENT
Start: 2020-04-19 | End: 2020-04-22 | Stop reason: HOSPADM

## 2020-04-19 RX ORDER — CEFTRIAXONE 1 G/1
1 INJECTION, POWDER, FOR SOLUTION INTRAMUSCULAR; INTRAVENOUS ONCE
Status: COMPLETED | OUTPATIENT
Start: 2020-04-19 | End: 2020-04-19

## 2020-04-19 RX ORDER — CLONAZEPAM 0.5 MG/1
1 TABLET ORAL AT BEDTIME
Status: DISCONTINUED | OUTPATIENT
Start: 2020-04-19 | End: 2020-04-22 | Stop reason: HOSPADM

## 2020-04-19 RX ORDER — DEXTROAMPHETAMINE SACCHARATE, AMPHETAMINE ASPARTATE MONOHYDRATE, DEXTROAMPHETAMINE SULFATE AND AMPHETAMINE SULFATE 2.5; 2.5; 2.5; 2.5 MG/1; MG/1; MG/1; MG/1
20 CAPSULE, EXTENDED RELEASE ORAL EVERY MORNING
Status: DISCONTINUED | OUTPATIENT
Start: 2020-04-20 | End: 2020-04-22 | Stop reason: HOSPADM

## 2020-04-19 RX ORDER — LABETALOL HYDROCHLORIDE 5 MG/ML
10 INJECTION, SOLUTION INTRAVENOUS
Status: DISCONTINUED | OUTPATIENT
Start: 2020-04-19 | End: 2020-04-22 | Stop reason: HOSPADM

## 2020-04-19 RX ORDER — POTASSIUM CL/LIDO/0.9 % NACL 10MEQ/0.1L
10 INTRAVENOUS SOLUTION, PIGGYBACK (ML) INTRAVENOUS
Status: DISCONTINUED | OUTPATIENT
Start: 2020-04-19 | End: 2020-04-22 | Stop reason: HOSPADM

## 2020-04-19 RX ORDER — POLYETHYLENE GLYCOL 3350 17 G/17G
17 POWDER, FOR SOLUTION ORAL DAILY PRN
Status: DISCONTINUED | OUTPATIENT
Start: 2020-04-19 | End: 2020-04-22 | Stop reason: HOSPADM

## 2020-04-19 RX ORDER — CLONAZEPAM 1 MG/1
1 TABLET ORAL AT BEDTIME
COMMUNITY

## 2020-04-19 RX ORDER — POTASSIUM CHLORIDE 1.5 G/1.58G
20-40 POWDER, FOR SOLUTION ORAL
Status: DISCONTINUED | OUTPATIENT
Start: 2020-04-19 | End: 2020-04-22 | Stop reason: HOSPADM

## 2020-04-19 RX ORDER — LAMOTRIGINE 100 MG/1
100 TABLET ORAL EVERY MORNING
COMMUNITY

## 2020-04-19 RX ORDER — BISACODYL 10 MG
10 SUPPOSITORY, RECTAL RECTAL DAILY PRN
Status: DISCONTINUED | OUTPATIENT
Start: 2020-04-19 | End: 2020-04-22 | Stop reason: HOSPADM

## 2020-04-19 RX ORDER — LORAZEPAM 0.5 MG/1
0.5 TABLET ORAL PRN
COMMUNITY

## 2020-04-19 RX ADMIN — LAMOTRIGINE 150 MG: 150 TABLET ORAL at 23:37

## 2020-04-19 RX ADMIN — SODIUM CHLORIDE 1000 ML: 9 INJECTION, SOLUTION INTRAVENOUS at 18:21

## 2020-04-19 RX ADMIN — ACETAMINOPHEN 650 MG: 325 TABLET, FILM COATED ORAL at 18:20

## 2020-04-19 RX ADMIN — AMITRIPTYLINE HYDROCHLORIDE 25 MG: 25 TABLET, FILM COATED ORAL at 23:37

## 2020-04-19 RX ADMIN — GABAPENTIN 400 MG: 100 CAPSULE ORAL at 23:37

## 2020-04-19 RX ADMIN — POTASSIUM CHLORIDE 40 MEQ: 1.5 POWDER, FOR SOLUTION ORAL at 20:05

## 2020-04-19 RX ADMIN — CEFTRIAXONE 1 G: 1 INJECTION, POWDER, FOR SOLUTION INTRAMUSCULAR; INTRAVENOUS at 21:40

## 2020-04-19 RX ADMIN — MORPHINE SULFATE 4 MG: 4 INJECTION INTRAVENOUS at 18:27

## 2020-04-19 RX ADMIN — HYDROMORPHONE HYDROCHLORIDE 0.5 MG: 1 INJECTION, SOLUTION INTRAMUSCULAR; INTRAVENOUS; SUBCUTANEOUS at 20:30

## 2020-04-19 RX ADMIN — POTASSIUM CHLORIDE 1000 ML/HR: 2 INJECTION, SOLUTION, CONCENTRATE INTRAVENOUS at 20:30

## 2020-04-19 RX ADMIN — SODIUM CHLORIDE: 9 INJECTION, SOLUTION INTRAVENOUS at 23:40

## 2020-04-19 RX ADMIN — CLONAZEPAM 1 MG: 0.5 TABLET ORAL at 23:37

## 2020-04-19 ASSESSMENT — ENCOUNTER SYMPTOMS
DIARRHEA: 0
VOMITING: 0
COUGH: 1
SORE THROAT: 1
FEVER: 1
SHORTNESS OF BREATH: 1

## 2020-04-19 NOTE — ED PROVIDER NOTES
History     Chief Complaint:  Difficulty breathing      HPI  Julia Smith is a 35 year old year old female who presents for evaluation of pain while breathing. She states that it hurts to breath on the left sided lower than her breast with pain while breathing . She states that the shortness of breath started yesterday and is constant and exacerbated while laying down. Pain is 4/10 during the day while increasing to 6/10 at night and in the morning while laying down. She also has had a dry cough and sore throat for the past 4-5 days with a noted fever for approximately a month, running 100-102 in the mornings. She endorses taking DayQuil at 1200 today. She denies any new diarrhea, vomiting, DVT history, leg pain or swelling. Of note her  works as a medical device  and there has been sickness in the facility, but he is currently not sick. She also reports that she has had pain across her mid back for 1 month and was seen here and diagnosed with possible UTI and discharged on an antibiotic, but when her urine culture results returned not showing any growth she was called and told to stop the antibiotic.  She had a CT Abd/Pelvis at that visit on 3/8/2020 which did not show any kidney stones.  She has not had any dysuria, urgency or frequency of urination.     Allergies:  Decadron   Depakote  Ibuprofen   Tramadol   Valporic Acid    Medications:   Amitriptyline  Adderall  Cipro   Valium   Clonazepam   Estradiol   Flonase  Gabapentin   Atarax  Lamotrigine  Nicotrol   Zofran   Oxycodone   Pyriduim   Miralax   Phenegran   Sertraline    Medical History:   Agoraphonia   Anxiety   Bipolar disorder 2  Endometriosis  Fibtomyalgia  Migraine  OCD  Panic attacks  PCOS  PTSD   TMJ  Glucose intolerance  Chronic pain   Gastroesophageal reflux disease     Surgical History   Colonoscopy   Combined cystoscopy   Cystoscopy 2x  Davinci assisted ablation of endomtriosis   Hysterectomy total   DaVinci lysis of  adhesions  Oophorectomy   Pelvic procedure 3x  Salpingectomy   Curettage suction with ultrasound   HCL PAP smear  Laparoscopic appendectomy   Laparoscopy diagnostic     Family History:   Mother: Depression   Father: Adopted    Social History:  Patient was not accompanied to the ED.   Smoking Status: Former Smoker    Type: Cigarettes    Quit 2016  Smokeless Tobacco: Never Used   Alcohol Use: Positive    Drug Use: Positive    Primary Care: Jill[facility] Palma      Review of Systems   Constitutional: Positive for fever.   HENT: Positive for sore throat.    Respiratory: Positive for cough and shortness of breath.    Cardiovascular: Positive for chest pain. Negative for leg swelling.   Gastrointestinal: Negative for diarrhea and vomiting.   All other systems reviewed and are negative.      Physical Exam     Patient Vitals for the past 24 hrs:   BP Temp Temp src Pulse Resp SpO2   04/19/20 1900 107/74 -- -- 103 -- 96 %   04/19/20 1845 109/73 -- -- 102 -- 96 %   04/19/20 1831 114/85 -- -- 105 -- 98 %   04/19/20 1731 -- 99.1  F (37.3  C) Oral 113 18 96 %          Physical Exam  Nursing note and vitals reviewed.  Constitutional:  Appears well-developed and well-nourished.   HENT:   Head:    Atraumatic.   Mouth/Throat:   Oropharynx is clear and moist. No oropharyngeal exudate.   Eyes:    Pupils are equal, round, and reactive to light.   Neck:    Normal range of motion. Neck supple.      No tracheal deviation present. No thyromegaly present.   Cardiovascular:  Normal rate, regular rhythm, no murmur   Pulmonary/Chest: Breath sounds are clear and equal without wheezes or crackles.  Abdominal:   Soft. Bowel sounds are normal. Exhibits no distension and      no mass. There is no tenderness.      There is no rebound and no guarding.   Back:                          Bilateral CVA tenderness.  Musculoskeletal:  Exhibits no edema.   Lymphadenopathy:  No cervical adenopathy.   Neurological:   Alert and oriented to person, place, and  time.   Skin:    Skin is warm and dry. No rash noted. No pallor.       Emergency Department Course     ECG:  ECG taken at 1825, ECG read at 1832  Normal sinus rhythm  Normal ECG  Rate 82 bpm. CO interval 156 ms. QRS duration 72 ms. QT/QTc 366/452 ms. P-R-T axes 66 64 55.    Imaging:  Radiology results were communicated with the patient who voiced understanding of the findings.    XR Chest Port 1 View  Negative chest.    CURT L BEHRNS, MD    Reading per radiology     Laboratory:  Laboratory findings were communicated with the patient who voiced understanding of the findings.    UA with Microscopic: ketone 10 (A) Protein albumin 30 (A) Leukocyte Esterase large (A) WBC/HPF  104 (H)  RBC/HPF 3 (H)  Squamous Epithelial 3 (H) mucous present (A) Calcium oxalate moderate (A)   HCG qualitative: Negative   Rapid Strep Test: Negative   Strep Culture: Pending   Covid 19: IP    Lactic Acid: 2.4 (H)   Troponin: (Collected 1816) <0.015   D dimer: < 0.3      CBC: WBC 8.7, HGB 12.5,   CMP: Glucose 112 (H) Potassium 2.9 (L)  (Creatinine 0.67) o/w WNL     Interventions:   1820 Tylenol 650 mg PO  1821 NS 1000 mL IV    1827 Morphine 4 mg IV  2005 Klor Con 40 mEq PO  2030 Dilaudid 0.5 mg IV   2030 IV Electrolyte 1000 mL/hr  2059 Rocephin 1 g IV    Emergency Department Course:    1726 Nursing notes and vitals reviewed.   I performed an exam of the patient as documented above.     1816 IV was inserted and blood was drawn for laboratory testing, results above.    1845 The patient was sent for XR while in the emergency department, results above.     2004 The patient provided a urine sample here in the emergency department. This was sent for laboratory testing, findings above.     2004 A throat sample was obtained for laboratory testing as documented above.     2124 I consulted with Dr. Stokes of the hospitalist services. They are in agreement to accept the patient for admission. Findings and plan explained to the Patient who  consents to admission. Discussed the patient with Dr. Cartagena, who will admit the patient to an observation bed for further monitoring, evaluation, and treatment.    Impression & Plan       The patient has signs of Severe Sepsis REMINDER: Please use septic shock SmartPhrase for Lactate > 4 or a patient requiring vasopressors after initial fluid bolus (meaning persistent hypotension)      If one the following conditions is present, a 30 mL/kg bolus is recommended as part of the 6 hour bundle (IBW can be used for BMI >30, or document refusal/contraindication):      1.   Initial hypotension  defined as 2 bps < 90 or map < 65 in the 6hrs before or 6hrs after time zero.     2.  Lactate >4.     The patient has signs of Severe Sepsis as evidenced by:    1. 2 SIRS criteria, AND  2. Suspected infection, AND   3. Organ dysfunction: Lactic Acid > 2.0    Time severe sepsis diagnosis confirmed: 20:04  04/19/20 as this was the time when Lactate resulted, and the level was > 2.0 and Urinalysis results returned showing UTI    3 Hour Severe Sepsis Bundle Completion:    1. Initial Lactic Acid Result:   Recent Labs   Lab Test 04/19/20  2138 04/19/20  1816 09/08/16 2045   LACT 2.0 2.4* 0.8     2. Blood Cultures before Antibiotics: Yes  3. Broad Spectrum Antibiotics Administered:  yes       Anti-infectives (From admission through now)    Start     Dose/Rate Route Frequency Ordered Stop    04/19/20 2058  cefTRIAXone (ROCEPHIN) 1 g vial to attach to  mL bag for ADULTS or NS 50 mL bag for PEDS      1 g  over 15-30 Minutes Intravenous ONCE 04/19/20 2058            4. Fluid volume administered in ED:  Full 30 mL/kg bolus given (see amount below).    BMI Readings from Last 1 Encounters:   03/08/20 19.97 kg/m      30 mL/kg fluids based on weight: 1,630 mL  30 mL/kg fluids based on IBW (must be >= 60 inches tall): 1,710 mL                Severe Sepsis reassessment:  1. Repeat Lactic Acid Level: 2.0    Covid-19  Julia Smith was  evaluated during a global COVID-19 pandemic, which necessitated consideration that the patient might be at risk for infection with the SARS-CoV-2 virus that causes COVID-19.   Applicable protocols for evaluation were followed during the patient's care.     Medical Decision Making:  Julia Smith is a 35 year old female who presents for evaluation of fever, pleuritic chest pain and SOB.  The patient was promptly evaluated for possible sepsis and met criteria with tachycardia, elevated Lactic Acid Level and UTI on her urinalysis. The patient had a normal blood pressure throughout the ED stay. Lactic acid was measured at 2.4. Therefore the patient did not meet criteria for Severe Sepsis.    The etiology of the sepsis is most likely due to pyelonephritis. Broad spectrum antibiotics were initiated in the ED after 2 sets of blood cultures and a urine specimen was obtained. A broad workup was done but I feel this is most likely infectious and treatment as noted above was directed towards infectious etiologies.  I did not feel CT was indicated today to look for ureteral calculus due to the fact that she had a negative CT 1 month ago which was negative for stone when her flank pain began.    I also had concern for possible COVID-19 infection as the cause of her respiratory symptoms, so COVID-19 test was sent.    I considered pericarditis in the DDX for her pleuritic chest pain, but she has a negative D-dimer, so pulmonary embolism is unlikely.  I feel that her tachycardia is due to sepsis and not due to PE.    Diagnosis:     ICD-10-CM    1. Acute sepsis (H)  A41.9    2. Acute pyelonephritis  N10         Disposition:  Admitted to Dr. Cartagena.    Discharge Medications:  New Prescriptions    No medications on file       Scribe Disclosure:  Earl LEIGH, am serving as a scribe at 5:42 PM on 4/19/2020 to document services personally performed by Rocío Alcala MD based on my observations and the provider's statements  to me.            Rocío Alcala MD  04/19/20 3000

## 2020-04-20 LAB
ANION GAP SERPL CALCULATED.3IONS-SCNC: 2 MMOL/L (ref 3–14)
BACTERIA SPEC CULT: NORMAL
BUN SERPL-MCNC: 12 MG/DL (ref 7–30)
CALCIUM SERPL-MCNC: 7.5 MG/DL (ref 8.5–10.1)
CHLORIDE SERPL-SCNC: 110 MMOL/L (ref 94–109)
CO2 SERPL-SCNC: 28 MMOL/L (ref 20–32)
CREAT SERPL-MCNC: 0.59 MG/DL (ref 0.52–1.04)
ERYTHROCYTE [DISTWIDTH] IN BLOOD BY AUTOMATED COUNT: 13 % (ref 10–15)
GFR SERPL CREATININE-BSD FRML MDRD: >90 ML/MIN/{1.73_M2}
GLUCOSE SERPL-MCNC: 88 MG/DL (ref 70–99)
HCT VFR BLD AUTO: 30.3 % (ref 35–47)
HGB BLD-MCNC: 10 G/DL (ref 11.7–15.7)
Lab: NORMAL
MCH RBC QN AUTO: 30.9 PG (ref 26.5–33)
MCHC RBC AUTO-ENTMCNC: 33 G/DL (ref 31.5–36.5)
MCV RBC AUTO: 94 FL (ref 78–100)
PLATELET # BLD AUTO: 231 10E9/L (ref 150–450)
POTASSIUM SERPL-SCNC: 4.5 MMOL/L (ref 3.4–5.3)
RBC # BLD AUTO: 3.24 10E12/L (ref 3.8–5.2)
SARS-COV-2 PCR COMMENT: NORMAL
SARS-COV-2 RNA SPEC QL NAA+PROBE: NEGATIVE
SARS-COV-2 RNA SPEC QL NAA+PROBE: NORMAL
SODIUM SERPL-SCNC: 140 MMOL/L (ref 133–144)
SPECIMEN SOURCE: NORMAL
WBC # BLD AUTO: 5.9 10E9/L (ref 4–11)

## 2020-04-20 PROCEDURE — 12000000 ZZH R&B MED SURG/OB

## 2020-04-20 PROCEDURE — 25000132 ZZH RX MED GY IP 250 OP 250 PS 637: Performed by: HOSPITALIST

## 2020-04-20 PROCEDURE — 80048 BASIC METABOLIC PNL TOTAL CA: CPT | Performed by: HOSPITALIST

## 2020-04-20 PROCEDURE — 36415 COLL VENOUS BLD VENIPUNCTURE: CPT | Performed by: HOSPITALIST

## 2020-04-20 PROCEDURE — 25000128 H RX IP 250 OP 636: Performed by: HOSPITALIST

## 2020-04-20 PROCEDURE — 25800030 ZZH RX IP 258 OP 636: Performed by: HOSPITALIST

## 2020-04-20 PROCEDURE — 99232 SBSQ HOSP IP/OBS MODERATE 35: CPT | Performed by: HOSPITALIST

## 2020-04-20 PROCEDURE — 85027 COMPLETE CBC AUTOMATED: CPT | Performed by: HOSPITALIST

## 2020-04-20 RX ADMIN — SERTRALINE HYDROCHLORIDE 200 MG: 50 TABLET ORAL at 08:48

## 2020-04-20 RX ADMIN — GABAPENTIN 400 MG: 100 CAPSULE ORAL at 08:48

## 2020-04-20 RX ADMIN — GABAPENTIN 400 MG: 100 CAPSULE ORAL at 20:27

## 2020-04-20 RX ADMIN — CEFTRIAXONE SODIUM 1 G: 1 INJECTION, POWDER, FOR SOLUTION INTRAMUSCULAR; INTRAVENOUS at 20:33

## 2020-04-20 RX ADMIN — ACETAMINOPHINE, CAFFEINE 2 TABLET: 500; 65 TABLET, FILM COATED ORAL at 16:50

## 2020-04-20 RX ADMIN — SODIUM CHLORIDE: 9 INJECTION, SOLUTION INTRAVENOUS at 12:26

## 2020-04-20 RX ADMIN — AMITRIPTYLINE HYDROCHLORIDE 25 MG: 25 TABLET, FILM COATED ORAL at 21:46

## 2020-04-20 RX ADMIN — LAMOTRIGINE 100 MG: 100 TABLET ORAL at 08:49

## 2020-04-20 RX ADMIN — DEXTROAMPHETAMINE SACCHARATE, AMPHETAMINE ASPARTATE MONOHYDRATE, DEXTROAMPHETAMINE SULFATE, AMPHETAMINE SULFATE 20 MG: 2.5; 2.5; 2.5; 2.5 CAPSULE, EXTENDED RELEASE ORAL at 10:37

## 2020-04-20 RX ADMIN — OXYCODONE HYDROCHLORIDE 5 MG: 5 TABLET ORAL at 20:28

## 2020-04-20 RX ADMIN — ACETAMINOPHEN 650 MG: 325 TABLET, FILM COATED ORAL at 12:35

## 2020-04-20 RX ADMIN — OXYCODONE HYDROCHLORIDE 5 MG: 5 TABLET ORAL at 03:06

## 2020-04-20 RX ADMIN — SENNOSIDES AND DOCUSATE SODIUM 2 TABLET: 8.6; 5 TABLET ORAL at 20:28

## 2020-04-20 RX ADMIN — ACETAMINOPHEN 650 MG: 325 TABLET, FILM COATED ORAL at 20:28

## 2020-04-20 RX ADMIN — GABAPENTIN 400 MG: 100 CAPSULE ORAL at 14:50

## 2020-04-20 RX ADMIN — OXYCODONE HYDROCHLORIDE 5 MG: 5 TABLET ORAL at 08:49

## 2020-04-20 RX ADMIN — OXYCODONE HYDROCHLORIDE 5 MG: 5 TABLET ORAL at 12:35

## 2020-04-20 RX ADMIN — CLONAZEPAM 1 MG: 0.5 TABLET ORAL at 21:42

## 2020-04-20 RX ADMIN — LAMOTRIGINE 150 MG: 150 TABLET ORAL at 20:27

## 2020-04-20 RX ADMIN — OXYCODONE HYDROCHLORIDE 5 MG: 5 TABLET ORAL at 16:50

## 2020-04-20 ASSESSMENT — ACTIVITIES OF DAILY LIVING (ADL)
ADLS_ACUITY_SCORE: 13
ADLS_ACUITY_SCORE: 11

## 2020-04-20 NOTE — PROGRESS NOTES
RECEIVING UNIT ED HANDOFF REVIEW    ED Nurse Handoff Report was reviewed by: Andressa Rivas RN on April 19, 2020 at 9:37 PM

## 2020-04-20 NOTE — H&P
[facility]    History and Physical  Hospitalist       Date of Admission:  4/19/2020  Date of Service (when I saw the patient): 04/19/20    Assessment & Plan   Julia Smith is a 35 year old female who presents with difficulty breathing. Admitted for further evaluation and treatment.     Shortness of breath: D dimer is <0.3 on admission. CXR negative on admission. EKG Sinus rhythm on admission.  Patient presenting for evaluation of shortness of breath, stating that it hurts to breathe on the left side of her chest.  Patient states that she has shortness of breath starting since day prior to admission.  Patient states the shortness of breath is worse while lying down.  Patient rates her pain at a 4-6 out of 10, and reports dry cough and sore throat for the past several days.  Patient does note fever up to 102 usually in the morning.  Patient has been taking DayQuil at home.  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  Patient denies abdominal pain, nausea, vomiting, lower extremity edema, calf pain, ear pain, eye pain, headache, blood in the stool or urine, constipation.  - COVID rule out  - Isolation precautions.   - Supportive management.     Suspected pyelonephritis: Urine pregnancy negative. Lactic acid 2.4 on admission. WBC is 8.7 on admission. U/a with large LE on admission, 104 WBC, 3 squamous epithelial cells.   - Consider repeat CT as clinically indicated.   - Repeat lactic acid.   - BC x2 pending.   - Rocephin.   - UC pending.     Hypokalemia: Potassium of 2.9 on admission.   - Monitor and replete.     Recurrent sleep attacks. Likely Dx is Cataplexy and narcolepsy per neurology.   - Monitor.     History of bipolar disorder.   - PTA Amitriptyline.   - PTA Adderall  - PTA Clonazepam  - PTA Gabapentin.   - PTA Lamotrigine  - PTA Sertraline.      History of endometriosis with complications of adhesions due to da Prerna  surgery. patient was on fentanyl in the past.  - Monitor.      History of fibromyalgia, polycystic ovarian syndrome, migraine.   - Monitor.   - PTA Amitriptyline.   - PTA Adderall  - PTA Clonazepam  - PTA Gabapentin.   - PTA Lamotrigine  - PTA Sertraline.     History of GERD. Stable.     DVT Prophylaxis: Pneumatic Compression Devices  Code Status: Full Code    Disposition: Inpatient.    Dr. Matias Stokes D.O.  [facility] Hospitalist  Pager [phone]    Primary Care Physician   Jillian Palma    Chief Complaint   Shortness of breath    History is obtained from the patient and medical records.     History of Present Illness   Julia Smith is a 35 year old female who presents with difficulty breathing. Admitted for further evaluation and treatment. D dimer is <0.3 on admission. CXR negative on admission. EKG Sinus rhythm on admission.  Patient presenting for evaluation of shortness of breath, stating that it hurts to breathe on the left side of her chest.  Patient states that she has shortness of breath starting since day prior to admission.  Patient states the shortness of breath is worse while lying down.  Patient rates her pain at a 4-6 out of 10, and reports dry cough and sore throat for the past several days.  Patient does note fever up to 102 usually in the morning.  Patient has been taking DayQuil at home.  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  Patient denies abdominal pain, nausea, vomiting, lower extremity edema, calf pain, ear pain, eye pain, headache, blood in the stool or urine, constipation.    Past Medical History    I have reviewed this patient's medical history and updated it with pertinent information if needed.   Past Medical History:   Diagnosis Date     Agoraphobia      Anxiety, generalized      Bipolar 2 disorder (H) 12/17/2013    [facility]     Endometriosis     confirmed by [facility]     Fibromyalgia      Gastro-oesophageal reflux disease      Migraine      OCD (obsessive compulsive disorder)      Other chronic pain     PELVIC     Panic attacks      PCOS (polycystic ovarian syndrome)      PTSD (post-traumatic stress disorder)      TMJ (dislocation of temporomandibular joint)        Past Surgical History   I have reviewed this patient's surgical history and updated it with pertinent information if needed.  Past Surgical History:   Procedure Laterality Date     COLONOSCOPY N/A 8/22/2014    Procedure: COLONOSCOPY;  Surgeon: Shannon Solis MD;  Location:  GI     COMBINED CYSTOSCOPY, INSERT CATHETER URETER Bilateral 5/9/2018    Procedure: COMBINED CYSTOSCOPY, INSERT CATHETER URETER;;  Surgeon: Marvin Umana MD;  Location: [facility]     CYSTOSCOPY N/A 9/3/2014    Procedure: CYSTOSCOPY;  Surgeon: Justin Wells MD;  Location: [facility]     CYSTOSCOPY N/A 8/3/2016    Procedure: CYSTOSCOPY;  Surgeon: Justin Wells MD;  Location: [facility]     DAVINCI ASSISTED ABLATION / EXCISION OF ENDOMETRIOSIS N/A 8/3/2016    Procedure: DAVINCI ASSISTED ABLATION / EXCISION OF ENDOMETRIOSIS;  Surgeon: Justin Wells MD;  Location: [facility]     DAVINCI HYSTERECTOMY TOTAL N/A 8/3/2016    Procedure: DAVINCI HYSTERECTOMY TOTAL;  Surgeon: Justin Wells MD;  Location: [facility]     DAVINCI LYSIS OF ADHESIONS N/A 5/9/2018    Procedure: DAVINCI LYSIS OF ADHESIONS;  CYSTOSCOPY, PLACEMENT OF BILATERAL URETERAL CATHETERS (SUE) DAVINCI LAPAROSCOPY, LYSIS ADHESIONS, LEFT URETEROLYSIS (KIN) ;  Surgeon: Justin Wells MD;  Location: [facility]     DAVINCI OOPHORECTOMY Right 7/19/2017    Procedure: DAVINCI OOPHORECTOMY;;  Surgeon: Justin Wells MD;  Location: [facility]     DAVINCI PELVIC PROCEDURE  9/5/2012    Procedure: DAVINCI PELVIC PROCEDURE;  Diagnostic Laparoscopy, Robotic Assisted Endometrial Resection, Tubal dye study ;  Surgeon: Johanny Velásquez DO;  Location:  [facility]     DAVINCI PELVIC PROCEDURE N/A 9/3/2014    Procedure: DAVINCI PELVIC PROCEDURE;  Surgeon: Justin Wells MD;  Location: [facility]     DAVINCI PELVIC PROCEDURE Right 7/19/2017    Procedure: DAVINCI PELVIC PROCEDURE;  DAVINCI PELVISCOPY WITH RIGHT OOPHORECTOMY ;  Surgeon: Justin Wells MD;  Location: [facility]     DAVINCI SALPINGECTOMY Right 8/3/2016    Procedure: DAVINCI SALPINGECTOMY;  Surgeon: Justin Wells MD;  Location: [facility]     DAVINCI SALPINGO-OOPHORECTOMY INCLUDING BILATERAL Left 8/3/2016    Procedure: DAVINCI SALPINGO-OOPHORECTOMY INCLUDING BILATERAL;  Surgeon: Justin Wells MD;  Location: [facility]     DILATION AND CURETTAGE SUCTION WITH ULTRASOUND GUIDANCE  6/24/2014    Procedure: DILATION AND CURETTAGE SUCTION WITH ULTRASOUND GUIDANCE;  Surgeon: Johanny Velásquez DO;  Location: [facility]     DILATION AND CURETTAGE, HYSTEROSCOPY DIAGNOSTIC, COMBINED N/A 9/3/2014    Procedure: COMBINED DILATION AND CURETTAGE, HYSTEROSCOPY DIAGNOSTIC;  Surgeon: Justin Wells MD;  Location: [facility]     HCL PAP SMEAR  12/2006    WNL     LAPAROSCOPIC APPENDECTOMY N/A 9/3/2014    Procedure: LAPAROSCOPIC APPENDECTOMY;  Surgeon: Ángel Duffy MD;  Location: [facility]     LAPAROSCOPY DIAGNOSTIC (GYN)  9/5/2012    Procedure: LAPAROSCOPY DIAGNOSTIC (GYN);;  Surgeon: Johanny Velásquez DO;  Location: [facility]       Prior to Admission Medications   Prior to Admission Medications   Prescriptions Last Dose Informant Patient Reported? Taking?   AMITRIPTYLINE HCL PO  Self Yes No   Sig: Take 25 mg by mouth At Bedtime   Amphetamine-Dextroamphetamine (ADDERALL XR PO)  Self Yes No   Sig: Take 10 mg by mouth every morning (patient takes 2 X 5 mg = 10 mg dose)   CLONAZEPAM PO  Self Yes No   Sig: Take 1 mg by mouth 2 times daily as needed for anxiety    DiazePAM (VALIUM PO)  Self Yes No   Sig: Place 10 mg vaginally daily as needed    ESTRADIOL PO  Self Yes No   Sig: Take 1 mg by mouth every morning   "  Fluticasone Propionate (FLONASE ALLERGY RELIEF NA)  Self Yes No   Sig: Spray 1 spray in nostril daily as needed   GABAPENTIN PO  Self Yes No   Sig: Take 400 mg by mouth 3 times daily    LAMOTRIGINE PO  Self Yes No   Sig: Take 100 mg by mouth 2 times daily   SERTRALINE HCL PO  Self Yes No   Sig: Take 150 mg by mouth every morning (patient takes 1.5 X 100 mg = 150 mg dose)   acetaminophen (TYLENOL) 325 MG tablet   No No   Sig: Take 2 tablets (650 mg) by mouth every 4 hours as needed for other (mild pain)   Patient not taking: Reported on 2/19/2019   ciprofloxacin (CIPRO) 500 MG tablet   No No   Sig: Take 1 tablet (500 mg) by mouth 2 times daily   fluconazole (DIFLUCAN) 150 MG tablet   No No   Sig: Take 1 tablet (150 mg) by mouth once for 1 dose   hydrOXYzine (ATARAX) 25 MG tablet   No No   Sig: Take 1 tablet (25 mg) by mouth every 6 hours as needed for itching (and nausea)   Patient not taking: Reported on 2/19/2019   nicotine (NICOTROL) 10 MG Inhaler  Self Yes No   Sig: Inhale 6-16 Cartridges into the lungs daily as needed for smoking cessation   ondansetron (ZOFRAN ODT) 4 MG ODT tab   No No   Sig: Take 1-2 tablets (4-8 mg) by mouth every 8 hours as needed for nausea   oxyCODONE (ROXICODONE) 5 MG tablet   No No   Sig: Take 1 tablet (5 mg) by mouth every 6 hours as needed for severe pain   phenazopyridine (PYRIDIUM) 200 MG tablet   No No   Sig: Take 1 tablet (200 mg) by mouth 3 times daily as needed for pain   polyethylene glycol (MIRALAX) Packet  Self Yes No   Sig: Take 17 g by mouth daily as needed for constipation    promethazine (PHENERGAN) 25 MG tablet  Self No No   Sig: Take 1 tablet (25 mg) by mouth every 6 hours as needed for nausea   Patient not taking: Reported on 2/19/2019      Facility-Administered Medications: None     Allergies   Allergies   Allergen Reactions     Decadron [Dexamethasone] Other (See Comments)     Muscle cramps  No problem with prednisone     Depakote [Divalproex Sodium]      \"slows " "vision  - Weird reaction\"     Ibuprofen Nausea and Vomiting     Tramadol Hives and Other (See Comments)     Causes adverse reaction with other medications that pt takes     Valproic Acid Rash     (Divalproex)       Social History   I have reviewed this patient's social history and updated it with pertinent information if needed. Julia Smith  reports that she has been smoking cigarettes. She has a 2.50 pack-year smoking history. She has never used smokeless tobacco. She reports current alcohol use. She reports current drug use. Drug: Marijuana.    Family History   I have reviewed this patient's family history and updated it with pertinent information if needed.   Family History   Adopted: Yes   Problem Relation Age of Onset     Depression Mother      Unknown/Adopted Mother         adopted     Unknown/Adopted Father         adopted     Depression Maternal Grandmother        Review of Systems   The 10 point Review of Systems is negative other than noted in the HPI or here.     Physical Exam   Temp: 99.1  F (37.3  C) Temp src: Oral BP: 107/74 Pulse: 103   Resp: 18 SpO2: 96 % O2 Device: None (Room air)    Vital Signs with Ranges  Temp:  [99.1  F (37.3  C)] 99.1  F (37.3  C)  Pulse:  [102-113] 103  Resp:  [18] 18  BP: (107-114)/(73-85) 107/74  SpO2:  [96 %-98 %] 96 %  0 lbs 0 oz    GENERAL: Alert and oriented. NAD. Conversational, appropriate.   HEENT: Normocephalic. EOMI. No icterus or injection. Nares normal.   LUNGS: Clear to auscultation. No dyspnea at rest.   HEART: Regular rate. Extremities perfused.   ABDOMEN: Soft, nontender, and nondistended. Positive bowel sounds.   EXTREMITIES: No LE edema noted.   NEUROLOGIC: Moves extremities x4 on command. No acute focal neurologic abnormalities noted.     Data   Data reviewed today:  I personally reviewed both laboratory and imaging data.   Recent Labs   Lab 04/19/20  1816   WBC 8.7   HGB 12.5   MCV 92         POTASSIUM 2.9*   CHLORIDE 106   CO2 28   BUN " 14   CR 0.67   ANIONGAP 6   EFRAÍN 8.5   *   ALBUMIN 4.0   PROTTOTAL 7.2   BILITOTAL 0.2   ALKPHOS 77   ALT 19   AST 12   TROPI <0.015       Recent Results (from the past 24 hour(s))   XR Chest Port 1 View    Narrative    XR CHEST PORT 1 VW 4/19/2020 6:45 PM    HISTORY: Cough, fever, shortness of breath with pleuritic left chest  pain.    COMPARISON: 6/6/2019, 9/20/2017      Impression    Impression: Negative chest.    CURT L BEHRNS, MD

## 2020-04-20 NOTE — PLAN OF CARE
Cognitive Concerns/ Orientation : A & O x 4  BEHAVIOR & AGGRESSION TOOL COLOR: Green   ABNL VS/O2: VSS on RA, soft BP at baseline  MOBILITY: SBA  PAIN MANAGMENT: Oxy given x 1  DIET: Regular  BOWEL/BLADDER: Continent of B & B  ABNL LAB/BG: N/a  DRAIN/DEVICES: PIV infusing at 75 mL/h  SKIN: Intact  TESTS/PROCEDURES: N/a  D/C DAY/GOALS/PLACE: D

## 2020-04-20 NOTE — PROGRESS NOTES
[facility]    Medicine Progress Note - Hospitalist Service       Date of Admission:  4/19/2020  Assessment & Plan   Julia Smith is a 35 year old female who presents with difficulty breathing. Admitted for further evaluation and treatment.      Suspected pyelonephritis  Urine pregnancy negative. Lactic acid 2.4 on admission. WBC is 8.7 on admission. U/a with large LE on admission, 104 WBC, 3 squamous epithelial cells.   - Consider repeat CT as clinically indicated 4/21, could consider Urology consult if not improving.  - Repeat lactic acid normalized  - BC x2 pending.   - Rocephin continued for now  - UC pending.      Shortness of breath - improved  D dimer is <0.3 on admission. CXR negative on admission. EKG Sinus rhythm on admission.  Patient presenting for evaluation of shortness of breath, stating that it hurts to breathe on the left side of her chest.  Patient states that she has shortness of breath starting since day prior to admission.  Patient states the shortness of breath is worse while lying down.  Patient rates her pain at a 4-6 out of 10, and reports dry cough and sore throat for the past several days.  Patient does note fever up to 102 usually in the morning.  Patient has been taking DayQuil at home.  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  Patient denies abdominal pain, nausea, vomiting, lower extremity edema, calf pain, ear pain, eye pain, headache, blood in the stool or urine, constipation.  - COVID negative  - Supportive management.     Hypokalemia  Potassium of 2.9 on admission.   - Monitor and replete.      Recurrent sleep attacks. Likely Dx is Cataplexy and narcolepsy per neurology.   - Monitor.     History of bipolar disorder.   - PTA Amitriptyline.   - PTA Adderall  - PTA Clonazepam  - PTA Gabapentin.   - PTA Lamotrigine  - PTA Sertraline.      History of endometriosis with complications of adhesions  due to da Prerna surgery. patient was on fentanyl in the past.  - Monitor.      History of fibromyalgia, polycystic ovarian syndrome, migraine.   - Monitor.   - PTA Amitriptyline.   - PTA Adderall  - PTA Clonazepam  - PTA Gabapentin.   - PTA Lamotrigine  - PTA Sertraline.      History of GERD. Stable.     Diet: Combination Diet Regular Diet Adult  Snacks/Supplements Adult: Boost Breeze; Between Meals    DVT Prophylaxis: Pneumatic Compression Devices  Galindo Catheter: in place, indication:    Code Status: Full Code      Disposition Plan   Expected discharge: possibly home tomorrow if improved and urine culture resulted  Entered: Nate Cook MD 04/20/2020, 5:09 PM       The patient's care was discussed with the Bedside Nurse and Patient.    Nate Cook MD  Hospitalist Service  [facility]    ______________________________________________________________________    Interval History   Seen and examined.  Continues to have some urinary discomfort as well as back pain.  No fevers or chills.  Shortness of breath is improved.  COVID negative.  She reports that she had this before and her culture ended up being negative entirely.    Data reviewed today: I reviewed all medications, new labs and imaging results over the last 24 hours. I personally reviewed no images or EKG's today.    Physical Exam   Vital Signs: Temp: 96.4  F (35.8  C) Temp src: Oral BP: 97/62 Pulse: 83   Resp: 16 SpO2: 94 % O2 Device: None (Room air)    Weight: 127 lbs 4 oz    Gen: NAD, pleasant  HEENT: Normocephalic, EOMI, MMM  Resp: no crackles,  no wheezes, no increased work of resp  CV: S1S2 heard, reg rhythm, reg rate, no pedal edema  Abdo: soft, nontender, nondistended, bowel sounds present  Ext: calves nontender, well perfused  Neuro: AAOx3, CN grossly intact, no facial asymmetry      Data   Recent Labs   Lab 04/20/20  0714 04/19/20  2138 04/19/20  1816   WBC 5.9  --  8.7   HGB 10.0*  --  12.5   MCV 94  --  92     --   314     --  140   POTASSIUM 4.5 3.5 2.9*   CHLORIDE 110*  --  106   CO2 28  --  28   BUN 12  --  14   CR 0.59  --  0.67   ANIONGAP 2*  --  6   EFRAÍN 7.5*  --  8.5   GLC 88  --  112*   ALBUMIN  --   --  4.0   PROTTOTAL  --   --  7.2   BILITOTAL  --   --  0.2   ALKPHOS  --   --  77   ALT  --   --  19   AST  --   --  12   TROPI  --   --  <0.015     Recent Results (from the past 24 hour(s))   XR Chest Port 1 View    Narrative    XR CHEST PORT 1 VW 4/19/2020 6:45 PM    HISTORY: Cough, fever, shortness of breath with pleuritic left chest  pain.    COMPARISON: 6/6/2019, 9/20/2017      Impression    Impression: Negative chest.    CURT L BEHRNS, MD

## 2020-04-20 NOTE — CONSULTS
"BRIEF NUTRITION ASSESSMENT      REASON FOR ASSESSMENT:  Julia Smith is a 35 year old female seen by Registered Dietitian for Admission Nutrition Risk Screen for unintentional loss of 10# or more in the past two months    NUTRITION HISTORY:  Pt reports she has a history of foods \"going right through her\" if she eats too much all at once.  She has to eat small amounts frequently.  Her weight loss occurred 1.5 years ago, and she has managed to maintain reasonably well as of late.  She was told by a provider 1 year ago last Feb that the diarrhea was likely due to stress.  When she kept a food diary, she realized that the more amounts of food she ate, the worse the diarrhea.  She tried Ensure which caused diarrhea.  She likes yogurt and tolerates it well if she eats it very slowly.  She states she likely lost a combination of both fat and muscle back then, but she's not a big exercise person with defined muscles.  She did feel a lot weaker.    CURRENT DIET AND INTAKE:  Diet:  Regular              Pt ate all breakfast and lunch today (spread it out, and ate slowly).    ANTHROPOMETRICS:  Height:  5 ft 5 in  Weight:  127 lbs 4 oz  Body mass index is 21.18 kg/m .   Weight Status: Normal BMI  IBW:  56.8 kg  %IBW:  102%  Weight History:  - Pt reports weight loss of 50 lbs over 6-8 months time 1.5 years ago.  - As below, weight loss 25 lbs (18%) over past 1.5 years.  - Weight up 3.3 kg over past 9 months.    Wt Readings from Last 20 Encounters:   04/20/20 57.7 kg (127 lb 4 oz)   03/08/20 54.4 kg (120 lb)   07/19/19 54.4 kg (120 lb)   06/06/19 55.3 kg (122 lb)   02/19/19 59.4 kg (131 lb)   09/05/18 66.2 kg (146 lb)   08/31/18 62.1 kg (137 lb)   08/28/18 63.2 kg (139 lb 4.8 oz)   05/09/18 64.9 kg (143 lb)   03/18/18 65.8 kg (145 lb)   01/26/18 68 kg (150 lb)   09/20/17 64.4 kg (142 lb)   09/10/17 63.5 kg (139 lb 15.9 oz)   09/04/17 63.5 kg (140 lb)   07/20/17 68.3 kg (150 lb 9.6 oz)   07/07/17 64 kg (141 lb)   06/28/17 64.4 " kg (142 lb)   06/07/17 65.8 kg (145 lb)   05/25/17 66.7 kg (147 lb)   05/15/17 65.8 kg (145 lb)       LABS:  Labs noted    MALNUTRITION:  Visual Nutrition Focused Physical Assessment (NFPA) not completed due to COVID-19 distancing precautions.  Patient does not meet two of the following criteria necessary for diagnosing malnutrition.     % Weight Loss:  Weight loss does not meet criteria for malnutrition   % Intake:  No decreased intake noted  Subcutaneous Fat Loss:  Unable to determine without physical exam  Muscle Loss:  Unable to determine without physical exam  Fluid Retention:  None noted    NUTRITION INTERVENTION:  Nutrition Diagnosis:  No nutrition diagnosis at this time.    Implementation:  Nutrition Education:  Per Provider order if indicated.  Discussed the use of supplements to optimize intake.  Medical food supplement therapy - Ordered Berry Boost Breeze BID btw meals.    FOLLOW UP/MONITORING:   Will re-evaluate in 7 - 10 days, or sooner, if re-consulted.    Mally Almeida, RD, LD, CNSC

## 2020-04-20 NOTE — PLAN OF CARE
[id number]  Pt A&Ox4, VSS on RA. Afebrile at this time. C/o generalized body aches. Up with SBA. K replaced in ED, recheck 3.5. LA 2.0, MD aware. UC pending. LS diminished. Pt reported diarrhea before admission, no diarrhea yet this shift. Reg diet.     Covid negative. MD notified, precautions discontinued. Transfer pt to . Report called to NITIN Noble.

## 2020-04-20 NOTE — PHARMACY-ADMISSION MEDICATION HISTORY
Pharmacy Medication History  Admission medication history interview status for the 4/19/2020  admission is complete. See EPIC admission navigator for prior to admission medications     Medication history sources: Patient and Dhaval  Medication history source reliability: Good  Adherence assessment: Moderate    Significant changes made to the medication list:  Deleted Cipro, estradiol, flonase, hydroxyzine, nicotrol, zofran, oxycodone, Pyridium, promethazine  Added Lorazepam  Updated/formatted strength and SIGs on tylenol, amitriptyline, clonazepam, gabapentin, lamotrigine, sertraline.       Additional medication history information:   Patient had an extensive but an old med list. Spoke to the patient and verified all the medications. However, wasn't completely comfortable with deleting and re-entering the entire list. Called [facility] and verified all the medications and SIGs to ensure the info I got from the patient was up to date. Last dispense was done April 13th 2020 and all the information provided by the patient matched with the information provided by Dhaval. The only medication that wasn't recent but pt claims having on hand (lorazepam prn) was last dispensed a long time ago at the retail pharmacy. Patient is not taking any birth control or smoking cessation at this time.   Medication reconciliation completed by provider prior to medication history? No    Time spent in this activity: 40min      Prior to Admission medications    Medication Sig Last Dose Taking? Auth Provider   acetaminophen (TYLENOL) 500 MG tablet Take 1,000 mg by mouth every 6 hours as needed for mild pain prn at prn Yes Unknown, Entered By History   amitriptyline (ELAVIL) 25 MG tablet Take 25 mg by mouth At Bedtime 4/18/2020 at pm Yes Unknown, Entered By History   amphetamine-dextroamphetamine (ADDERALL XR) 20 MG 24 hr capsule Take 20 mg by mouth every morning 4/19/2020 at Unknown time Yes Unknown, Entered By History   clonazePAM  (KLONOPIN) 1 MG tablet Take 1 mg by mouth At Bedtime 4/18/2020 at Unknown time Yes Unknown, Entered By History   DiazePAM (VALIUM PO) Place 10 mg vaginally daily as needed  prn at prn Yes Reported, Patient   gabapentin (NEURONTIN) 400 MG capsule Take 400 mg by mouth 3 times daily 4/19/2020 at noon Yes Unknown, Entered By History   lamoTRIgine (LAMICTAL) 100 MG tablet Take 100 mg by mouth every morning 4/19/2020 at Unknown time Yes Unknown, Entered By History   lamoTRIgine (LAMICTAL) 100 MG tablet Take 150 mg by mouth every evening 4/18/2020 at Unknown time Yes Unknown, Entered By History   LORazepam (ATIVAN) 0.5 MG tablet Take 0.5 mg by mouth as needed for anxiety prn at prn Yes Unknown, Entered By History   sertraline (ZOLOFT) 100 MG tablet Take 200 mg by mouth every morning 4/19/2020 at Unknown time Yes Unknown, Entered By History   polyethylene glycol (MIRALAX) Packet Take 17 g by mouth daily as needed for constipation  prn at prn  Reported, Patient

## 2020-04-20 NOTE — ED NOTES
"[facility]  ED Nurse Handoff Report    ED Chief complaint: Shortness of Breath; Cough; and Fever      ED Diagnosis:   Final diagnoses:   Acute sepsis (H)   Acute pyelonephritis       Code Status: Full Code    Allergies:   Allergies   Allergen Reactions     Decadron [Dexamethasone] Other (See Comments)     Muscle cramps  No problem with prednisone     Depakote [Divalproex Sodium]      \"slows vision  - Weird reaction\"     Ibuprofen Nausea and Vomiting     Tramadol Hives and Other (See Comments)     Causes adverse reaction with other medications that pt takes     Valproic Acid Rash     (Divalproex)       Patient Story: Cough, SOB, with slight fever.  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 hasnt felt well for a few months.   Focused Assessment:  Cough, pain in chest with SOB    Treatments and/or interventions provided: see MAR  Patient's response to treatments and/or interventions:     To be done/followed up on inpatient unit:      Does this patient have any cognitive concerns?: alert and oriented    Activity level - Baseline/Home:  Independent  Activity Level - Current:   Independent    Patient's Preferred language: English   Needed?: No    Isolation: None and Other: covid precautions  Infection: Not Applicable  covid precautions  Bariatric?: No    Vital Signs:   Vitals:    04/19/20 1731 04/19/20 1831 04/19/20 1845 04/19/20 1900   BP:  114/85 109/73 107/74   Pulse: 113 105 102 103   Resp: 18      Temp: 99.1  F (37.3  C)      TempSrc: Oral      SpO2: 96% 98% 96% 96%       Cardiac Rhythm:     Was the PSS-3 completed:   Yes  What interventions are required if any?               Family Comments:   OBS brochure/video discussed/provided to patient/family: Yes              Name of person given brochure if not patient:               Relationship to patient:     For the majority of the shift this patient's behavior was Green.   Behavioral interventions performed were .    ED NURSE PHONE NUMBER: [phone]   "

## 2020-04-20 NOTE — PLAN OF CARE
A&O.  Soft BP but patient asymptomatic.  Afebrile.  Oxycodone given for back pain and headache.  C/O urinary hesitancy.  Tolerating regular diet.  Up in room independently.  Plan to continue IV abx.

## 2020-04-20 NOTE — PROVIDER NOTIFICATION
MD Notification    Notified Person: MD    Notified Person Name: Kike    Notification Date/Time: paged at 0204    Notification Interaction: paged    Purpose of Notification: Covid neg    Orders Received: discontinue precautions      Comments:

## 2020-04-21 ENCOUNTER — APPOINTMENT (OUTPATIENT)
Dept: MRI IMAGING | Facility: CLINIC | Age: 35
End: 2020-04-21
Attending: HOSPITALIST
Payer: COMMERCIAL

## 2020-04-21 LAB
ANION GAP SERPL CALCULATED.3IONS-SCNC: <1 MMOL/L (ref 3–14)
BUN SERPL-MCNC: 7 MG/DL (ref 7–30)
CALCIUM SERPL-MCNC: 8.3 MG/DL (ref 8.5–10.1)
CHLORIDE SERPL-SCNC: 110 MMOL/L (ref 94–109)
CO2 SERPL-SCNC: 31 MMOL/L (ref 20–32)
CREAT SERPL-MCNC: 0.59 MG/DL (ref 0.52–1.04)
ERYTHROCYTE [DISTWIDTH] IN BLOOD BY AUTOMATED COUNT: 12.9 % (ref 10–15)
GFR SERPL CREATININE-BSD FRML MDRD: >90 ML/MIN/{1.73_M2}
GLUCOSE SERPL-MCNC: 97 MG/DL (ref 70–99)
HCT VFR BLD AUTO: 31.8 % (ref 35–47)
HGB BLD-MCNC: 10.6 G/DL (ref 11.7–15.7)
MCH RBC QN AUTO: 30.6 PG (ref 26.5–33)
MCHC RBC AUTO-ENTMCNC: 33.3 G/DL (ref 31.5–36.5)
MCV RBC AUTO: 92 FL (ref 78–100)
PLATELET # BLD AUTO: 256 10E9/L (ref 150–450)
POTASSIUM SERPL-SCNC: 4.5 MMOL/L (ref 3.4–5.3)
RBC # BLD AUTO: 3.46 10E12/L (ref 3.8–5.2)
SODIUM SERPL-SCNC: 140 MMOL/L (ref 133–144)
WBC # BLD AUTO: 7.7 10E9/L (ref 4–11)

## 2020-04-21 PROCEDURE — 12000000 ZZH R&B MED SURG/OB

## 2020-04-21 PROCEDURE — 25000132 ZZH RX MED GY IP 250 OP 250 PS 637: Performed by: HOSPITALIST

## 2020-04-21 PROCEDURE — 25800030 ZZH RX IP 258 OP 636: Performed by: HOSPITALIST

## 2020-04-21 PROCEDURE — 85027 COMPLETE CBC AUTOMATED: CPT | Performed by: HOSPITALIST

## 2020-04-21 PROCEDURE — 25000128 H RX IP 250 OP 636: Performed by: HOSPITALIST

## 2020-04-21 PROCEDURE — 99233 SBSQ HOSP IP/OBS HIGH 50: CPT | Performed by: HOSPITALIST

## 2020-04-21 PROCEDURE — 72148 MRI LUMBAR SPINE W/O DYE: CPT

## 2020-04-21 PROCEDURE — 80048 BASIC METABOLIC PNL TOTAL CA: CPT | Performed by: HOSPITALIST

## 2020-04-21 PROCEDURE — 36415 COLL VENOUS BLD VENIPUNCTURE: CPT | Performed by: HOSPITALIST

## 2020-04-21 RX ORDER — PHENAZOPYRIDINE HYDROCHLORIDE 100 MG/1
100 TABLET, FILM COATED ORAL
Status: DISCONTINUED | OUTPATIENT
Start: 2020-04-21 | End: 2020-04-22 | Stop reason: HOSPADM

## 2020-04-21 RX ORDER — MORPHINE SULFATE 2 MG/ML
1 INJECTION, SOLUTION INTRAMUSCULAR; INTRAVENOUS
Status: DISCONTINUED | OUTPATIENT
Start: 2020-04-21 | End: 2020-04-22 | Stop reason: HOSPADM

## 2020-04-21 RX ORDER — HYDROMORPHONE HYDROCHLORIDE 1 MG/ML
0.2 INJECTION, SOLUTION INTRAMUSCULAR; INTRAVENOUS; SUBCUTANEOUS
Status: DISCONTINUED | OUTPATIENT
Start: 2020-04-21 | End: 2020-04-21

## 2020-04-21 RX ORDER — LORAZEPAM 0.5 MG/1
0.5 TABLET ORAL EVERY 4 HOURS PRN
Status: COMPLETED | OUTPATIENT
Start: 2020-04-21 | End: 2020-04-21

## 2020-04-21 RX ADMIN — GABAPENTIN 400 MG: 100 CAPSULE ORAL at 07:53

## 2020-04-21 RX ADMIN — ACETAMINOPHINE, CAFFEINE 1 TABLET: 500; 65 TABLET, FILM COATED ORAL at 07:29

## 2020-04-21 RX ADMIN — GABAPENTIN 400 MG: 100 CAPSULE ORAL at 13:18

## 2020-04-21 RX ADMIN — SENNOSIDES AND DOCUSATE SODIUM 1 TABLET: 8.6; 5 TABLET ORAL at 05:42

## 2020-04-21 RX ADMIN — ACETAMINOPHINE, CAFFEINE 2 TABLET: 500; 65 TABLET, FILM COATED ORAL at 01:20

## 2020-04-21 RX ADMIN — ACETAMINOPHINE, CAFFEINE 2 TABLET: 500; 65 TABLET, FILM COATED ORAL at 14:43

## 2020-04-21 RX ADMIN — LAMOTRIGINE 100 MG: 100 TABLET ORAL at 07:53

## 2020-04-21 RX ADMIN — SODIUM CHLORIDE: 9 INJECTION, SOLUTION INTRAVENOUS at 01:25

## 2020-04-21 RX ADMIN — OXYCODONE HYDROCHLORIDE 5 MG: 5 TABLET ORAL at 00:47

## 2020-04-21 RX ADMIN — PHENAZOPYRIDINE HYDROCHLORIDE 100 MG: 100 TABLET ORAL at 13:18

## 2020-04-21 RX ADMIN — MORPHINE SULFATE 1 MG: 2 INJECTION, SOLUTION INTRAMUSCULAR; INTRAVENOUS at 10:30

## 2020-04-21 RX ADMIN — OXYCODONE HYDROCHLORIDE 10 MG: 5 TABLET ORAL at 21:46

## 2020-04-21 RX ADMIN — OXYCODONE HYDROCHLORIDE 10 MG: 5 TABLET ORAL at 18:03

## 2020-04-21 RX ADMIN — OXYCODONE HYDROCHLORIDE 5 MG: 5 TABLET ORAL at 06:31

## 2020-04-21 RX ADMIN — ACETAMINOPHINE, CAFFEINE 2 TABLET: 500; 65 TABLET, FILM COATED ORAL at 20:59

## 2020-04-21 RX ADMIN — DEXTROAMPHETAMINE SACCHARATE, AMPHETAMINE ASPARTATE MONOHYDRATE, DEXTROAMPHETAMINE SULFATE, AMPHETAMINE SULFATE 20 MG: 2.5; 2.5; 2.5; 2.5 CAPSULE, EXTENDED RELEASE ORAL at 07:53

## 2020-04-21 RX ADMIN — OXYCODONE HYDROCHLORIDE 5 MG: 5 TABLET ORAL at 05:42

## 2020-04-21 RX ADMIN — CLONAZEPAM 1 MG: 0.5 TABLET ORAL at 21:00

## 2020-04-21 RX ADMIN — OXYCODONE HYDROCHLORIDE 10 MG: 5 TABLET ORAL at 13:21

## 2020-04-21 RX ADMIN — LORAZEPAM 0.5 MG: 0.5 TABLET ORAL at 14:48

## 2020-04-21 RX ADMIN — ONDANSETRON 4 MG: 2 INJECTION INTRAMUSCULAR; INTRAVENOUS at 13:05

## 2020-04-21 RX ADMIN — SENNOSIDES AND DOCUSATE SODIUM 1 TABLET: 8.6; 5 TABLET ORAL at 18:03

## 2020-04-21 RX ADMIN — GABAPENTIN 400 MG: 100 CAPSULE ORAL at 20:59

## 2020-04-21 RX ADMIN — PHENAZOPYRIDINE HYDROCHLORIDE 100 MG: 100 TABLET ORAL at 17:58

## 2020-04-21 RX ADMIN — SERTRALINE HYDROCHLORIDE 200 MG: 50 TABLET ORAL at 07:53

## 2020-04-21 RX ADMIN — AMITRIPTYLINE HYDROCHLORIDE 25 MG: 25 TABLET, FILM COATED ORAL at 21:00

## 2020-04-21 RX ADMIN — LAMOTRIGINE 150 MG: 150 TABLET ORAL at 20:59

## 2020-04-21 ASSESSMENT — ACTIVITIES OF DAILY LIVING (ADL)
ADLS_ACUITY_SCORE: 13

## 2020-04-21 NOTE — PROGRESS NOTES
[facility]    Medicine Progress Note - Hospitalist Service       Date of Admission:  4/19/2020  Assessment & Plan   Julia Smith is a 35 year old female who presents with difficulty breathing. Admitted for further evaluation and treatment.      Urinary retention  Low back pain  Patient states that she has been dealing with urinary retention type symptoms and low back pain that wraps around both sides of her back for the last 1 month.  Urine pregnancy negative. Lactic acid 2.4 on admission. WBC is 8.7 on admission. U/a with large LE on admission, 104 WBC, 3 squamous epithelial cells.  CT abdomen pelvis noncontrast on 3/8 was negative for stones.  - Repeat lactic acid normalized  - BC x2 NGTD  -Urine cultures with normal urogenital meche only.  -Discontinued antibiotics.  -Have consulted urology.  -Will obtain MRI lumbar spine with and without contrast given back pain with urinary retention.  No other concerning neurological signs or symptoms  In lower extremities.  -Galindo has been placed for urinary retention.  Has needed straight cath x2.     Shortness of breath - improved  D dimer is <0.3 on admission. CXR negative on admission. EKG Sinus rhythm on admission.  Patient presenting for evaluation of shortness of breath, stating that it hurts to breathe on the left side of her chest.  Patient states that she has shortness of breath starting since day prior to admission.  Patient states the shortness of breath is worse while lying down.  Patient rates her pain at a 4-6 out of 10, and reports dry cough and sore throat for the past several days.  Patient does note fever up to 102 usually in the morning.  Patient has been taking DayQuil at home.  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  Patient denies abdominal pain, nausea, vomiting, lower extremity edema, calf pain, ear pain, eye pain, headache, blood in the stool or urine,  constipation.  - COVID negative  - Supportive management.     Hypokalemia  Potassium of 2.9 on admission.   - Monitor and replete.      Recurrent sleep attacks. Likely Dx is Cataplexy and narcolepsy per neurology.   - Monitor.     History of bipolar disorder.   - PTA Amitriptyline.   - PTA Adderall  - PTA Clonazepam  - PTA Gabapentin.   - PTA Lamotrigine  - PTA Sertraline.      History of endometriosis with complications of adhesions due to da Prerna surgery. patient was on fentanyl in the past.  - Monitor.      History of fibromyalgia, polycystic ovarian syndrome, migraine.   - Monitor.   - PTA Amitriptyline.   - PTA Adderall  - PTA Clonazepam  - PTA Gabapentin.   - PTA Lamotrigine  - PTA Sertraline.      History of GERD. Stable.     Diet: Combination Diet Regular Diet Adult  Snacks/Supplements Adult: Boost Breeze; Between Meals    DVT Prophylaxis: Pneumatic Compression Devices  Galindo Catheter: in place, indication: Retention  Code Status: Full Code      Disposition Plan   Expected discharge: In 1 to 2 days pending MRI results and neurology recommendations.  Entered: Manuela Mae MD 04/21/2020, 2:35 PM       The patient's care was discussed with the Bedside Nurse and Patient.    Manuela Mae MD  Hospitalist Service  [facility]    ______________________________________________________________________    Interval History   Seen and examined.  Continues to have some urinary discomfort as well as back pain.  No fevers or chills.  Shortness of breath is improved.  COVID negative.  On close questioning she does not really admit to dysuria but more pain in her lower back that seems to wrap around both sides of her back.  Tells me that about a month ago she was evaluated for the same symptoms and had a negative UA back then as well and antibiotics were discontinued.    Data reviewed today: I reviewed all medications, new labs and imaging results over the last 24 hours. I personally reviewed no images  or EKG's today.    Physical Exam   Vital Signs: Temp: 97.3  F (36.3  C) Temp src: Oral BP: 100/69 Pulse: 83 Heart Rate: 87 Resp: 16 SpO2: 96 % O2 Device: None (Room air)    Weight: 129 lbs 4.8 oz    Gen: NAD, pleasant  HEENT: Normocephalic, EOMI, MMM  Resp: no crackles,  no wheezes, no increased work of resp  CV: S1S2 heard, reg rhythm, reg rate, no pedal edema  Abdo: soft, nontender, nondistended, bowel sounds present  Ext: calves nontender, well perfused  Neuro: AAOx3, CN grossly intact, no facial asymmetry      Data   Recent Labs   Lab 04/21/20  0815 04/20/20  0714 04/19/20  2138 04/19/20  1816   WBC 7.7 5.9  --  8.7   HGB 10.6* 10.0*  --  12.5   MCV 92 94  --  92    231  --  314    140  --  140   POTASSIUM 4.5 4.5 3.5 2.9*   CHLORIDE 110* 110*  --  106   CO2 31 28  --  28   BUN 7 12  --  14   CR 0.59 0.59  --  0.67   ANIONGAP <1* 2*  --  6   EFRAÍN 8.3* 7.5*  --  8.5   GLC 97 88  --  112*   ALBUMIN  --   --   --  4.0   PROTTOTAL  --   --   --  7.2   BILITOTAL  --   --   --  0.2   ALKPHOS  --   --   --  77   ALT  --   --   --  19   AST  --   --   --  12   TROPI  --   --   --  <0.015     No results found for this or any previous visit (from the past 24 hour(s)).

## 2020-04-21 NOTE — PROVIDER NOTIFICATION
MD Notification    Notified Person: MD    Notified Person Name: Dr. Mae    Notification Date/Time: 4/21/20 1042    Notification Interaction: text page    Purpose of Notification: strait cath for 850cc urine, can I have pain meds, also requesting pyridium.    Orders Received: urology consult, morphine 1mg q2h IV prn, pyridium TID.    Comments:

## 2020-04-21 NOTE — PLAN OF CARE
Oriented x4.  VSS but hypotensive.  IV fluids and IV antibiotics infusing.  CO pain in low back, oxycodone and tylenol given with some relief.  Excedrine given for headache.  Diminished lungs in bases.  Tolerating regular diet.  Hesitancy with voiding.  Ambulating independently within room.  Nursing will continue to monitor.

## 2020-04-21 NOTE — PLAN OF CARE
Oriented x4.  VSS.  C/o pain to haley lower back, unable to void prn morphine IV prn oxycodone, heat, and excedrine given for pain.   C/o nausea prn zofran given.  Pyridium given for voiding pain.  MRI done today prn ativan given.  Diminished lungs in bases.  Tolerating regular diet.  Hesitancy with voiding.  Unable to void strait cath x 1 for 850, second time placed greene with a urine return of 1000.  Ambulating independently within room.  Urology to see tomorrow.

## 2020-04-21 NOTE — PLAN OF CARE
Cognitive Concerns/ Orientation : A & O x 4   BEHAVIOR & AGGRESSION TOOL COLOR: Green   ABNL VS/O2: VSS on RA, soft BP at baseline  MOBILITY: Independent  PAIN MANAGMENT: C/o of pain in head and back. Oxy given x 3.  DIET: Regular  BOWEL/BLADDER: Continent. Urinary hesitancy/retention, straight cath 625 mL. Pt states she feels constipated, 1 senna given.   ABNL LAB/BG: N/a  DRAIN/DEVICES: NS running at 75 mL/h  SKIN: Intact  TESTS/PROCEDURES: N/a  D/C DAY/GOALS/PLACE: TBD  OTHER IMPORTANT INFO: Lung sounds clear JAMES.

## 2020-04-21 NOTE — PROGRESS NOTES
Urology progress NOte  Consult received for urinary retention; chart and images reviewed; talked to the nurse     Plan: her retention is most likely neurogenic/pcychiatric in origin; no signs of urosepsis/pyelo seen     Plan: CIC teacaing vs. Galindo            MRI of the spine to r/o neurologic origin,also I worry about MS            Will see her tomorrow            If retention persists she will need UDS as outpatient once  Epidemics is resolved     Yeny Philip MD

## 2020-04-22 VITALS
RESPIRATION RATE: 16 BRPM | WEIGHT: 127.1 LBS | SYSTOLIC BLOOD PRESSURE: 110 MMHG | OXYGEN SATURATION: 94 % | HEART RATE: 86 BPM | BODY MASS INDEX: 21.15 KG/M2 | TEMPERATURE: 96.9 F | DIASTOLIC BLOOD PRESSURE: 66 MMHG

## 2020-04-22 LAB
CRP SERPL-MCNC: 6 MG/L (ref 0–8)
ERYTHROCYTE [SEDIMENTATION RATE] IN BLOOD BY WESTERGREN METHOD: 17 MM/H (ref 0–20)
HGB BLD-MCNC: 11.1 G/DL (ref 11.7–15.7)

## 2020-04-22 PROCEDURE — 36415 COLL VENOUS BLD VENIPUNCTURE: CPT | Performed by: HOSPITALIST

## 2020-04-22 PROCEDURE — 86140 C-REACTIVE PROTEIN: CPT | Performed by: HOSPITALIST

## 2020-04-22 PROCEDURE — 85652 RBC SED RATE AUTOMATED: CPT | Performed by: HOSPITALIST

## 2020-04-22 PROCEDURE — 85018 HEMOGLOBIN: CPT | Performed by: HOSPITALIST

## 2020-04-22 PROCEDURE — 25000132 ZZH RX MED GY IP 250 OP 250 PS 637: Performed by: HOSPITALIST

## 2020-04-22 PROCEDURE — 99239 HOSP IP/OBS DSCHRG MGMT >30: CPT | Performed by: HOSPITALIST

## 2020-04-22 RX ORDER — TAMSULOSIN HYDROCHLORIDE 0.4 MG/1
0.4 CAPSULE ORAL AT BEDTIME
Status: DISCONTINUED | OUTPATIENT
Start: 2020-04-22 | End: 2020-04-22 | Stop reason: HOSPADM

## 2020-04-22 RX ORDER — TAMSULOSIN HYDROCHLORIDE 0.4 MG/1
0.4 CAPSULE ORAL AT BEDTIME
Qty: 30 CAPSULE | Refills: 1 | Status: SHIPPED | OUTPATIENT
Start: 2020-04-22 | End: 2020-05-22

## 2020-04-22 RX ORDER — PHENAZOPYRIDINE HYDROCHLORIDE 100 MG/1
100 TABLET, FILM COATED ORAL 3 TIMES DAILY PRN
Qty: 10 TABLET | Refills: 0 | Status: SHIPPED | OUTPATIENT
Start: 2020-04-22

## 2020-04-22 RX ORDER — ACETAMINOPHEN AND CODEINE PHOSPHATE 300; 30 MG/1; MG/1
1 TABLET ORAL EVERY 4 HOURS PRN
Qty: 15 TABLET | Refills: 0 | Status: SHIPPED | OUTPATIENT
Start: 2020-04-22

## 2020-04-22 RX ADMIN — GABAPENTIN 400 MG: 100 CAPSULE ORAL at 08:34

## 2020-04-22 RX ADMIN — OXYCODONE HYDROCHLORIDE 10 MG: 5 TABLET ORAL at 11:17

## 2020-04-22 RX ADMIN — OXYCODONE HYDROCHLORIDE 10 MG: 5 TABLET ORAL at 07:48

## 2020-04-22 RX ADMIN — OXYCODONE HYDROCHLORIDE 10 MG: 5 TABLET ORAL at 14:27

## 2020-04-22 RX ADMIN — PHENAZOPYRIDINE HYDROCHLORIDE 100 MG: 100 TABLET ORAL at 14:27

## 2020-04-22 RX ADMIN — LAMOTRIGINE 100 MG: 100 TABLET ORAL at 08:34

## 2020-04-22 RX ADMIN — SERTRALINE HYDROCHLORIDE 200 MG: 50 TABLET ORAL at 08:34

## 2020-04-22 RX ADMIN — ACETAMINOPHINE, CAFFEINE 2 TABLET: 500; 65 TABLET, FILM COATED ORAL at 07:48

## 2020-04-22 RX ADMIN — PHENAZOPYRIDINE HYDROCHLORIDE 100 MG: 100 TABLET ORAL at 08:34

## 2020-04-22 RX ADMIN — GABAPENTIN 400 MG: 100 CAPSULE ORAL at 14:27

## 2020-04-22 RX ADMIN — DEXTROAMPHETAMINE SACCHARATE, AMPHETAMINE ASPARTATE MONOHYDRATE, DEXTROAMPHETAMINE SULFATE, AMPHETAMINE SULFATE 20 MG: 2.5; 2.5; 2.5; 2.5 CAPSULE, EXTENDED RELEASE ORAL at 08:34

## 2020-04-22 ASSESSMENT — ACTIVITIES OF DAILY LIVING (ADL)
ADLS_ACUITY_SCORE: 13

## 2020-04-22 NOTE — PLAN OF CARE
Pt is A&OX4. VSS ex soft BP. C/o of back pain, PRN oxy given x2. PRN excedrine given x2 for headache. MRI done yesterday- see results. Galindo placed for retention yesterday, helping with pain signficantly. Reg diet. Up ind. PIV SL. Plan pending. Urology to see pt today. Slept between cares.

## 2020-04-22 NOTE — PLAN OF CARE
VSS, afebrile, pt upset w-urologist. RN suggested follow up from partner. Will discharge w-greene and remove it at home, then will self-cath TID as needed. Catheters and instruction provided. AVS reviewed, questions answered, supplies provided for 3-days. Follow up tele-health arranged for next week. Will discharge home w-family

## 2020-04-22 NOTE — DISCHARGE INSTRUCTIONS
On Monday morning (April 27th) remove your greene catheter and begin intermittent catheterization three times daily as needed.

## 2020-04-22 NOTE — DISCHARGE SUMMARY
Discharge Summary  Hospitalist    Date of Admission:  4/19/2020  Date of Discharge:  4/22/2020  Discharging Provider: Manuela Mae MD  Date of Service (when I saw the patient): 04/22/20    Discharge Diagnoses   Urinary retention  Low back pain  Shortness of breath - improved  History of bipolar disorder.   Recurrent sleep attacks.  History of endometriosis with complications of adhesions due to da Prerna surgery.  History of fibromyalgia, polycystic ovarian syndrome, migraine  Chronic pain    History of Present Illness   Please refer H & P for details.      Hospital Course   Julia Smith is a 35 year old female who presents with difficulty breathing, urinary difficulty, concern for UTI. Admitted for further evaluation and treatment.      Urinary retention  Low back pain  Patient states that she has been dealing with urinary retention type symptoms and low back pain that wraps around both sides of her back for the last 1 month.  Urine pregnancy negative. Lactic acid 2.4 on admission. WBC is 8.7 on admission. U/a with large LE on admission, 104 WBC, 3 squamous epithelial cells.  CT abdomen pelvis noncontrast on 3/8 was negative for stones.  - Repeat lactic acid normalized. Sepsis ruled out given negative cultures..   - BC x2 NGTD  -Urine cultures with normal urogenital meche only.  -Discontinued antibiotics..  -Obtained MRI lumbar spine that was completely unremarkable.  -Galindo has been placed for urinary retention.  Has needed straight cath x2.  -Urology was consulted.  They recommended leaving the Galindo in and follow-up in 1 week .  -ESR and CRP were normal.  -On chart review, she has had chronic pain issues, specifically pelvic pain issues.  Has had multiple surgeries in the past.  Has a history of endometriosis, see below.  I did discuss about possibility of having recurrent endometriosis.  She insists on only following up with her own gynecologist and she will set up the appointment herself.  I also  discussed possibility of side effects from the several medication she is on specifically amitriptyline leading to urinary retention.  She did not want me to make any adjustments to any of her medications.  She insisted on following up only with her own psychiatrist.  Her labs overall have been very reassuring and she has not had any fevers during this hospital stay.  She is felt safe to discharge home.     Shortness of breath - improved  D dimer is <0.3 on admission. CXR negative on admission. EKG Sinus rhythm on admission.  Patient presenting for evaluation of shortness of breath, stating that it hurts to breathe on the left side of her chest.  Patient states that she has shortness of breath starting since day prior to admission.  Patient states the shortness of breath is worse while lying down.  Patient rates her pain at a 4-6 out of 10, and reports dry cough and sore throat for the past several days.  Patient does note fever up to 102 usually in the morning.  Patient has been taking DayQuil at home.  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  Patient denies abdominal pain, nausea, vomiting, lower extremity edema, calf pain, ear pain, eye pain, headache, blood in the stool or urine, constipation.  - COVID negative  - Supportive management.   -No fevers during hospital stay.     Hypokalemia  Potassium of 2.9 on admission.   - Monitor and repleted PRN.      Recurrent sleep attacks. Likely Dx is Cataplexy and narcolepsy per neurology.   - Monitor.     History of bipolar disorder.   - PTA Amitriptyline.   - PTA Adderall  - PTA Clonazepam  - PTA Gabapentin.   - PTA Lamotrigine  - PTA Sertraline.      History of endometriosis with complications of adhesions due to da Prerna surgery. patient was on fentanyl in the past.  - Monitor.      History of fibromyalgia, polycystic ovarian syndrome, migraine.   - Monitor.   - PTA Amitriptyline.   - PTA  Adderall  - PTA Clonazepam  - PTA Gabapentin.   - PTA Lamotrigine  - PTA Sertraline.       Manuela Mae MD, MD      Pending Results   These results will be followed up by Hospitalist team.  Unresulted Labs Ordered in the Past 30 Days of this Admission     Date and Time Order Name Status Description    4/19/2020 1803 Blood culture Preliminary     4/19/2020 1803 Blood culture Preliminary           Code Status   Full Code       Primary Care Physician   Jillian Palma    Follow-ups Needed After Discharge   Follow-up Appointments     Follow-up and recommended labs and tests       Call [phone] to schedule a televisit with Dr. Katz (urology) in   one week         Follow-up and recommended labs and tests       Follow up with primary care provider, Jillian Palma, within 7 days for   hospital follow- up.  No follow up labs or test are needed.   Please follow-up with your regular psychiatrist [for possible medication   adjustment] and gynecologist [for evaluation for endometriosis]             Physical Exam   Temp: 96.9  F (36.1  C) Temp src: Oral BP: 110/66 Pulse: 86 Heart Rate: 87 Resp: 16 SpO2: 94 % O2 Device: None (Room air)    Vitals:    04/20/20 0255 04/21/20 0607 04/22/20 0456   Weight: 57.7 kg (127 lb 4 oz) 58.7 kg (129 lb 4.8 oz) 57.7 kg (127 lb 1.6 oz)     Vital Signs with Ranges  Temp:  [96.3  F (35.7  C)-97  F (36.1  C)] 96.9  F (36.1  C)  Pulse:  [86] 86  Heart Rate:  [85-95] 87  Resp:  [16] 16  BP: ()/(58-66) 110/66  SpO2:  [92 %-95 %] 94 %  I/O last 3 completed shifts:  In: -   Out: 3100 [Urine:3100]    Constitutional: Alert, cooperative, no apparent distress  Respiratory: Non labored breathing, clear to auscultation bilaterally, no crackles or wheezing  Cardiovascular: Regular rate and rhythm, no murmurs, no edema  GI: Normal bowel sounds, soft, non-distended, non-tender  Skin: No obvious rash  Neuro: Alert, engages in appropriate conversation, fluent speech, moving all extremities, no facial  asymmetry  Psych: Calm and pleasant, no obvious anxiety/ depression      Discharge Disposition   Discharged to home  Condition at discharge: Stable    Consultations This Hospital Stay   UROLOGY IP CONSULT  UROLOGY IP CONSULT    Time Spent on this Encounter   I, Manuela Mae MD, personally saw the patient today and spent greater than 30 minutes discharging this patient.    Discharge Orders      Follow-up and recommended labs and tests     Call [phone] to schedule a televisit with Dr. Katz (urology) in one week     Reason for your hospital stay    You were hospitalized with shortness of breath and back pain.  No definite cause was found for your symptoms.  It is likely related to fibromyalgia/endometriosis.  You are also going home with a Galindo catheter.  You will need to follow-up with urology in a week.  Please follow-up with your regular psychiatrist [for possible medication adjustment] and gynecologist [for evaluation for endometriosis]     Follow-up and recommended labs and tests     Follow up with primary care provider, Jillian Palma, within 7 days for hospital follow- up.  No follow up labs or test are needed.   Please follow-up with your regular psychiatrist [for possible medication adjustment] and gynecologist [for evaluation for endometriosis]     Activity    Your activity upon discharge: activity as tolerated     When to contact your care team    Call your primary doctor if you have any of the following: Worsening pain, shortness of breath, cough, fever, problems with Galindo catheter     Full Code     Diet    Follow this diet upon discharge: Orders Placed This Encounter      Snacks/Supplements Adult: Boost Breeze; Between Meals      Combination Diet Regular Diet Adult     Discharge Medications   Current Discharge Medication List      START taking these medications    Details   acetaminophen-codeine (TYLENOL WITH CODEINE #3) 300-30 MG per tablet Take 1 tablet by mouth every 4 hours as needed for  "severe pain  Qty: 15 tablet, Refills: 0    Associated Diagnoses: Pelvic pain in female      phenazopyridine (PYRIDIUM) 100 MG tablet Take 1 tablet (100 mg) by mouth 3 times daily as needed for urinary tract discomfort  Qty: 10 tablet, Refills: 0    Associated Diagnoses: Pelvic pain in female      tamsulosin (FLOMAX) 0.4 MG capsule Take 1 capsule (0.4 mg) by mouth At Bedtime  Qty: 30 capsule, Refills: 1    Associated Diagnoses: Acute sepsis (H)         CONTINUE these medications which have NOT CHANGED    Details   amitriptyline (ELAVIL) 25 MG tablet Take 25 mg by mouth At Bedtime      amphetamine-dextroamphetamine (ADDERALL XR) 20 MG 24 hr capsule Take 20 mg by mouth every morning      clonazePAM (KLONOPIN) 1 MG tablet Take 1 mg by mouth At Bedtime      DiazePAM (VALIUM PO) 10 mg daily as needed       gabapentin (NEURONTIN) 400 MG capsule Take 400 mg by mouth 3 times daily      !! lamoTRIgine (LAMICTAL) 100 MG tablet Take 100 mg by mouth every morning      !! lamoTRIgine (LAMICTAL) 100 MG tablet Take 150 mg by mouth every evening      LORazepam (ATIVAN) 0.5 MG tablet Take 0.5 mg by mouth as needed for anxiety      sertraline (ZOLOFT) 100 MG tablet Take 200 mg by mouth every morning      polyethylene glycol (MIRALAX) Packet Take 17 g by mouth daily as needed for constipation        !! - Potential duplicate medications found. Please discuss with provider.      STOP taking these medications       acetaminophen (TYLENOL) 500 MG tablet Comments:   Reason for Stopping:             Allergies   Allergies   Allergen Reactions     Decadron [Dexamethasone] Other (See Comments)     Muscle cramps  No problem with prednisone     Depakote [Divalproex Sodium]      \"slows vision  - Weird reaction\"     Ibuprofen Nausea and Vomiting     Tramadol Hives and Other (See Comments)     Causes adverse reaction with other medications that pt takes     Valproic Acid Rash     (Divalproex)     Data   Most Recent 3 CBC's:  Recent Labs   Lab Test " 04/22/20  0957 04/21/20  0815 04/20/20  0714 04/19/20  1816   WBC  --  7.7 5.9 8.7   HGB 11.1* 10.6* 10.0* 12.5   MCV  --  92 94 92   PLT  --  256 231 314      Most Recent 3 BMP's:  Recent Labs   Lab Test 04/21/20  0815 04/20/20  0714 04/19/20  2138 04/19/20  1816    140  --  140   POTASSIUM 4.5 4.5 3.5 2.9*   CHLORIDE 110* 110*  --  106   CO2 31 28  --  28   BUN 7 12  --  14   CR 0.59 0.59  --  0.67   ANIONGAP <1* 2*  --  6   EFRAÍN 8.3* 7.5*  --  8.5   GLC 97 88  --  112*     Most Recent 2 LFT's:  Recent Labs   Lab Test 04/19/20 1816 06/06/19  1750   AST 12 9   ALT 19 12   ALKPHOS 77 66   BILITOTAL 0.2 0.3     Most Recent INR's and Anticoagulation Dosing History:  Anticoagulation Dose History     Recent Dosing and Labs Latest Ref Rng & Units 8/9/2014 8/14/2016 8/5/2017    INR 0.86 - 1.14 1.02 1.01 1.02        Most Recent 3 Troponin's:  Recent Labs   Lab Test 04/19/20 1816 05/25/17  1655 03/26/13  0820   TROPI <0.015 <0.015  The 99th percentile for upper reference range is 0.045 ug/L.  Troponin values in   the range of 0.045 - 0.120 ug/L may be associated with risks of adverse   clinical events.   <0.012     Most Recent Cholesterol Panel:  Recent Labs   Lab Test 05/13/16  0934   CHOL 208*   *   HDL 35*   TRIG 198*     Most Recent 6 Bacteria Isolates From Any Culture (See EPIC Reports for Culture Details):  Recent Labs   Lab Test 04/19/20 2004 04/19/20  1837 04/19/20  1816 03/08/20  1804 09/05/18  1247 08/31/18  1710   CULT <10,000 colonies/mL  urogenital meche  Susceptibility testing not routinely done   No growth after 3 days No growth after 3 days No growth 10,000 to 50,000 colonies/mL  mixed urogenital meche    Susceptibility testing not routinely done <10,000 colonies/mL  Candida albicans / dubliniensis  Candida albicans and Candida dubliniensis are not routinely speciated  Susceptibility testing not routinely done  *     Most Recent TSH, T4 and A1c Labs:  Recent Labs   Lab Test 02/19/19  9412    TSH 2.29   A1C 5.0       Results for orders placed or performed during the hospital encounter of 04/19/20   XR Chest Port 1 View    Narrative    XR CHEST PORT 1 VW 4/19/2020 6:45 PM    HISTORY: Cough, fever, shortness of breath with pleuritic left chest  pain.    COMPARISON: 6/6/2019, 9/20/2017      Impression    Impression: Negative chest.    CURT L BEHRNS, MD   MR Lumbar Spine w/o Contrast    Narrative    MR LUMBAR SPINE WITHOUT CONTRAST 4/21/2020 3:32 PM     HISTORY: Radiculopathy, > 6 weeks conservative treatment, persistent  symptoms. Low back pain and urinary retention for a month.     TECHNIQUE: Multiplanar multisequence images were obtained through the  lumbar spine without contrast.    FINDINGS: Five lumbar type vertebral bodies are presumed. Posterior  alignment is normal. The conus medullaris is normal in appearance with  its tip at the L2-3 level. Cauda equina nerve roots are normal.  Vertebral body heights and disc spaces are preserved. Bone marrow  signal intensity is normal.    T12-L1: Normal.    L1-L2: Normal.    L2-L3: Normal.    L3-L4: Normal. Incidental note is made of some nerve root sleeve cysts  bilaterally.     L4-L5: Normal.    L5-S1: Normal.    Paraspinal soft tissues: Unremarkable as visualized.      Impression    IMPRESSION: Negative lumbar spine MRI without contrast.    BJ BUTTERFIELD MD

## 2020-04-22 NOTE — CONSULTS
UROLOGY CONSULTATION:    PATIENT: Julia DANIELS (1985)  AGE: 35 year old year old female    Primary Care Provider / Referring Physician:  Jillian Palma    CHIEF COMPLAINT:  Pelvic pain in female  (primary encounter diagnosis)  Acute sepsis (H)  Acute pyelonephritis    HPI:   Admitted for suspected pyelo/h/o FM/endometriosis/pelvicpain/narcolepxy evaluated for urinary retention. Her back pain and abdominal pain is better with Galindo placed. PVR-1 liter came out.  CT 3/2020 was shantanu (no hydro, no stones); reviewed by me.       Past Medical History:   Diagnosis Date     Agoraphobia      Anxiety, generalized      Bipolar 2 disorder (H) 2013    [facility] [place]     Endometriosis     confirmed by lap. [facility] [place]     Fibromyalgia      Gastro-oesophageal reflux disease      Migraine      OCD (obsessive compulsive disorder)      Other chronic pain     PELVIC     Panic attacks      PCOS (polycystic ovarian syndrome)      PTSD (post-traumatic stress disorder)      TMJ (dislocation of temporomandibular joint)      Past Surgical History:   Procedure Laterality Date     COLONOSCOPY N/A 2014    Procedure: COLONOSCOPY;  Surgeon: Shannon Solis MD;  Location:  GI     COMBINED CYSTOSCOPY, INSERT CATHETER URETER Bilateral 2018    Procedure: COMBINED CYSTOSCOPY, INSERT CATHETER URETER;;  Surgeon: Marvin Umana MD;  Location:  OR     CYSTOSCOPY N/A 9/3/2014    Procedure: CYSTOSCOPY;  Surgeon: Justin Wells MD;  Location:  OR     CYSTOSCOPY N/A 8/3/2016    Procedure: CYSTOSCOPY;  Surgeon: Justin Wells MD;  Location:  OR     DAVINCI ASSISTED ABLATION / EXCISION OF ENDOMETRIOSIS N/A 8/3/2016    Procedure: DAVINCI ASSISTED ABLATION / EXCISION OF ENDOMETRIOSIS;  Surgeon: Justin Wells MD;  Location:  OR     DAVINCI HYSTERECTOMY TOTAL N/A 8/3/2016    Procedure: DAVINCI HYSTERECTOMY TOTAL;  Surgeon: Justin Wells MD;   Location: [facility] OR     DAVINCI LYSIS OF ADHESIONS N/A 5/9/2018    Procedure: DAVINCI LYSIS OF ADHESIONS;  CYSTOSCOPY, PLACEMENT OF BILATERAL URETERAL CATHETERS (SUE) DAVINCI LAPAROSCOPY, LYSIS ADHESIONS, LEFT URETEROLYSIS (KIN) ;  Surgeon: Justin Wells MD;  Location: [facility] OR     DAVINCI OOPHORECTOMY Right 7/19/2017    Procedure: DAVINCI OOPHORECTOMY;;  Surgeon: Justin Wells MD;  Location: [facility] OR     DAVINCI PELVIC PROCEDURE  9/5/2012    Procedure: DAVINCI PELVIC PROCEDURE;  Diagnostic Laparoscopy, Robotic Assisted Endometrial Resection, Tubal dye study ;  Surgeon: Johanny Velásquez DO;  Location: [facility] OR     DAVINCI PELVIC PROCEDURE N/A 9/3/2014    Procedure: DAVINCI PELVIC PROCEDURE;  Surgeon: Justin Wells MD;  Location: [facility] OR     DAVINCI PELVIC PROCEDURE Right 7/19/2017    Procedure: DAVINCI PELVIC PROCEDURE;  DAVINCI PELVISCOPY WITH RIGHT OOPHORECTOMY ;  Surgeon: Justin Wells MD;  Location: [facility] OR     DAVINCI SALPINGECTOMY Right 8/3/2016    Procedure: DAVINCI SALPINGECTOMY;  Surgeon: Justin Wells MD;  Location: [facility] OR     DAVINCI SALPINGO-OOPHORECTOMY INCLUDING BILATERAL Left 8/3/2016    Procedure: DAVINCI SALPINGO-OOPHORECTOMY INCLUDING BILATERAL;  Surgeon: Justin Wells MD;  Location:  OR     DILATION AND CURETTAGE SUCTION WITH ULTRASOUND GUIDANCE  6/24/2014    Procedure: DILATION AND CURETTAGE SUCTION WITH ULTRASOUND GUIDANCE;  Surgeon: Johanny Velásquez DO;  Location: [facility] OR     DILATION AND CURETTAGE, HYSTEROSCOPY DIAGNOSTIC, COMBINED N/A 9/3/2014    Procedure: COMBINED DILATION AND CURETTAGE, HYSTEROSCOPY DIAGNOSTIC;  Surgeon: Justin Wells MD;  Location: [facility] OR     HCL PAP SMEAR  12/2006    WNL     LAPAROSCOPIC APPENDECTOMY N/A 9/3/2014    Procedure: LAPAROSCOPIC APPENDECTOMY;  Surgeon: Ángel Duffy MD;  Location: [facility] OR     LAPAROSCOPY DIAGNOSTIC (GYN)  9/5/2012    Procedure: LAPAROSCOPY DIAGNOSTIC (GYN);;  Surgeon:  "Johanny Velásquez, DO;  Location: [facility] OR     PAST SOCIAL HISTORY  Social History     Social History Narrative     Not on file     Social History     Tobacco Use     Smoking status: Current Every Day Smoker     Packs/day: 0.50     Years: 5.00     Pack years: 2.50     Types: Cigarettes     Last attempt to quit: 7/30/2016     Years since quitting: 3.7     Smokeless tobacco: Never Used   Substance Use Topics     Alcohol use: Yes     Alcohol/week: 0.0 standard drinks     Comment: rarely     Review of Systems     The 10 point Review of Systems is negative other than noted in the HPI or here.              Current Outpatient Medications   Medication Sig Dispense Refill     acetaminophen-codeine (TYLENOL WITH CODEINE #3) 300-30 MG per tablet Take 1 tablet by mouth every 4 hours as needed for severe pain 15 tablet 0     tamsulosin (FLOMAX) 0.4 MG capsule Take 1 capsule (0.4 mg) by mouth At Bedtime 30 capsule 1                Allergies   Allergen Reactions     Decadron [Dexamethasone] Other (See Comments)     Muscle cramps  No problem with prednisone     Depakote [Divalproex Sodium]      \"slows vision  - Weird reaction\"     Ibuprofen Nausea and Vomiting     Tramadol Hives and Other (See Comments)     Causes adverse reaction with other medications that pt takes     Valproic Acid Rash     (Divalproex)       PHYSICAL EXAM:          /66 (BP Location: Left arm)   Pulse 86   Temp 96.9  F (36.1  C) (Oral)   Resp 16   Wt 57.7 kg (127 lb 1.6 oz)   LMP 10/23/2014 (LMP Unknown)   SpO2 94%   BMI 21.15 kg/m           General appearance shows no deformaties and good grooming.    Chest: clear  Abdomen: some CVA tendernes as well as tenderness over low back;   Abdomen: soft, tender at many points  Extrem; no edema Galindo drains clear urine     LABS:   No results found for: PSA  UA RESULTS:  Recent Labs   Lab Test 04/19/20 2004 09/05/18  1247   COLOR Yellow   < > Yellow   APPEARANCE Slightly Cloudy   < > Clear   URINEGLC " Negative   < > Negative   URINEBILI Negative   < > Negative   URINEKETONE 10*   < > Negative   SG 1.020   < > 1.020   UBLD Negative   < > Negative   URINEPH 6.5   < > 6.5   PROTEIN 30*   < > Negative   UROBILINOGEN  --   --  0.2   NITRITE Negative   < > Negative   LEUKEST Large*   < > Small*   RBCU 3*   < > 2-5*   WBCU 104*   < > 0 - 5    < > = values in this interval not displayed.     Creatinine   Date Value Ref Range Status   04/21/2020 0.59 0.52 - 1.04 mg/dL Final   ]  Hemoglobin   Date Value Ref Range Status   04/22/2020 11.1 (L) 11.7 - 15.7 g/dL Final   ]  MRI spine normal  Urine is non-infected but high WBC is secondary to high volume and retention     ASSESSMENT:   1. Pelvic pain in female    2. Acute sepsis (H)    3. Acute pyelonephritis      Urinary retention could be multifactorial (multipharmacy/ immobility/pain/voiding dysfunction )    PLAN:    Keep Galindo for one week (pt's preference, start CIC after)  Start flomax  F/u in 1 week with televisit on her retention progress  teaching how to do CIC now   Might need UDS in the future and more complete neuro-evaluation as outpatient  I am not convinces that she has pyelo since normal WBC in serum and no nitrate in urine. Abx for 5 days is reasonable  iven urinary retention/Galindo manipulations     Rob Philip MD

## 2020-04-23 ENCOUNTER — TELEPHONE (OUTPATIENT)
Dept: FAMILY MEDICINE | Facility: CLINIC | Age: 35
End: 2020-04-23

## 2020-04-23 NOTE — TELEPHONE ENCOUNTER
"ED/Discharge Protocol    \"Hi, my name is Jennifer LORENA Daigle RN, a registered nurse, and I am calling on behalf of Dr. Palma's's office at [place].  I am calling to follow up and see how things are going for you after your recent visit.\"    \"I see that you were in the (ER/UC/IP) on 4/19/2020.    How are you doing now that you are home?\"     Is patient experiencing symptoms that may require a hospital visit?    Patient is not having any concerning symptoms. Patient has a catheter in currently and is producing urine.     Discharge Instructions    \"Let's review your discharge instructions.  What is/are the follow-up recommendations?  Pt. Response: Patient is to follow up with urology. Patient is to follow up with PCP as needed, but she states she is going to go first to urology and go from there. Patient will call back to schedule with PCP if needed.     \"Were you instructed to make a follow-up appointment?\"  Pt. Response: No.       \"When you see the provider, I would recommend that you bring your discharge instructions with you.    Medications    \"How many new medications are you on since your hospitalization/ED visit?\"    2 or more - Epic MTM referral needed  \"How many of your current medicines changed (dose, timing, name, etc.) while you were in the hospital/ED visit?\"   2 or more - Epic MTM referral needed  \"Do you have questions about your medications?\"   No  \"Were you newly diagnosed with heart failure, COPD, diabetes or did you have a heart attack?\"   No  For patients on insulin: \"Did you start on insulin in the hospital or did you have your insulin dose changed?\"   No  Post Discharge Medication Reconciliation Status: discharge medications reconciled and changed, per note/orders (see AVS).    Was MTM referral placed (*Make sure to put transitions as reason for referral)?   No    Call Summary    \"Do you have any questions or concerns about your condition or care plan at the moment?\"    No  Triage nurse advice " "given: Follow up with urology for virtual visit. Patient states she is going to follow up with urology first and once the catheter is out then patient can follow up with Dr. Palma.     Patient was in ER x4 in the past year (assess appropriateness of ER visits.)      \"If you have questions or things don't continue to improve, we encourage you contact us through the main clinic number,  [phone].  Even if the clinic is not open, triage nurses are available 24/7 to help you.     We would like you to know that our clinic has extended hours (provide information).  We also have urgent care (provide details on closest location and hours/contact info)\"      \"Thank you for your time and take care!\"    Jennifer Daigle RN, BSN  [facility]      "

## 2020-04-23 NOTE — TELEPHONE ENCOUNTER
Pt was discharged from [facility] on 4/22 after being treated for Acute Sepsis (H), Pelvic Pain In Female. Please call the pt. Thank you.  Marguerite Vásquez,

## 2020-04-25 LAB
BACTERIA SPEC CULT: NO GROWTH
BACTERIA SPEC CULT: NO GROWTH
SPECIMEN SOURCE: NORMAL
SPECIMEN SOURCE: NORMAL

## 2020-11-06 NOTE — DISCHARGE INSTRUCTIONS
Discharge Instructions  Chest Pain    You have been seen today for chest pain or discomfort.  At this time, your doctor has found no signs that your chest pain is due to a serious or life-threatening condition, (or you have declined more testing and/or admission to the hospital). However, sometimes there is a serious problem that does not show up right away. Your evaluation today may not be complete and you may need further testing and evaluation.     You need to follow-up with your regular doctor within 3 days.    Return to the Emergency Department if:    Your chest pain changes, gets worse, starts to happen more often, or comes with less activity.    You are short of breath.    You get very weak or tired.    You pass out or faint.    You have any new symptoms, like fever, cough, numb legs, or you cough up blood.    You have anything else that worries you.    Until you follow-up with your regular doctor please do the following:    Take one aspirin daily unless you have an allergy or are told not to by your doctor.    If a stress test appointment has been made, go to the appointment.    If you have questions, contact your regular doctor.    If your doctor today has told you to follow-up with your regular doctor, it is very important that you make an appointment with your clinic and go to the appointment.  If you do not follow-up with your primary doctor, it may result in missing an important development which could result in permanent injury or disability and/or lasting pain.  If there is any problem keeping your appointment, call your doctor or return to the Emergency Department.    If you were given a prescription for medicine here today, be sure to read all of the information (including the package insert) that comes with your prescription.  This will include important information about the medicine, its side effects, and any warnings that you need to know about.  The pharmacist who fills the prescription can  Subjective:      Patient ID: Nery Lovelace is a 60 y.o. female. HPI    Patient is here for follow up. She was in the ER for groin pain. She has had prior hip replacement. Dr Chi Lopez operated on her hip many years ago. Xray was negative. She has COPD and chronic respiratory failure. She uses 2 L of oxygen at hs. She says her COPD is controlled. She sees Dr Chicas.     She has sleep apnea.     She has atrial fibrillation. She is usually in  NSR. She is on Eliquis.     She has a history of CAD. She has three stents. She takes baby ASA.     She has hypertension. It is controlled. She takes COREG.     She has chronic systolic and diastolic CHF. She takes Lasix and K prn.     She has HLD. It is controlled.     She has insomnia. She has almost weaned off Xanax.       Review of Systems   Constitutional: Negative for appetite change and fatigue. HENT: Negative for postnasal drip and rhinorrhea. Respiratory: Negative for shortness of breath and wheezing. Cardiovascular: Negative for chest pain and palpitations. Gastrointestinal: Negative for abdominal pain, nausea and vomiting. Musculoskeletal: Negative for joint swelling. Skin: Negative for rash. Neurological: Negative for light-headedness. Psychiatric/Behavioral: Negative for sleep disturbance. There are no changes to past medical history, family history, social history or review of systems(except as noted in the history section) since prior note (all reviewed with patient). Current Outpatient Medications   Medication Instructions    aclidinium (TUDORZA PRESSAIR) 400 MCG/ACT AEPB inhaler 1 puff, Inhalation, DAILY    albuterol (PROVENTIL) 2.5 mg, Nebulization, EVERY 6 HOURS PRN    albuterol sulfate HFA (PROVENTIL HFA) 108 (90 Base) MCG/ACT inhaler 2 puffs, Inhalation, EVERY 6 HOURS PRN    ALPRAZolam (XANAX) 0.25 MG tablet Take 1 tablet by mouth daily for 14 days, THEN 1 tablet every other day for 14 days.  Take 0.5 mg by mouth 3 provide more information and answer questions you may have about the medicine.  If you have questions or concerns that the pharmacist cannot address, please call or return to the Emergency Department.     Opioid Medication Information    Pain medications are among the most commonly prescribed medicines, so we are including this information for all our patients. If you did not receive pain medication or get a prescription for pain medicine, you can ignore it.     You may have been given a prescription for an opioid (narcotic) pain medicine and/or have received a pain medicine while here in the Emergency Department. These medicines can make you drowsy or impaired. You must not drive, operate dangerous equipment, or engage in any other dangerous activities while taking these medications. If you drive while taking these medications, you could be arrested for DUI, or driving under the influence. Do not drink any alcohol while you are taking these medications.     Opioid pain medications can cause addiction. If you have a history of chemical dependency of any type, you are at a higher risk of becoming addicted to pain medications.  Only take these prescribed medications to treat your pain when all other options have been tried. Take it for as short a time and as few doses as possible. Store your pain pills in a secure place, as they are frequently stolen and provide a dangerous opportunity for children or visitors in your house to start abusing these powerful medications. We will not replace any lost or stolen medicine.  As soon as your pain is better, you should flush all your remaining medication.     Many prescription pain medications contain Tylenol  (acetaminophen), including Vicodin , Tylenol #3 , Norco , Lortab , and Percocet .  You should not take any extra pills of Tylenol  if you are using these prescription medications or you can get very sick.  Do not ever take more than 3000 mg of acetaminophen in any 24 hour  times daily as needed. Zuleyka Brewster amLODIPine (NORVASC) 5 mg, Oral, DAILY    apixaban (ELIQUIS) 5 mg, Oral, 2 TIMES DAILY    aspirin 81 mg, Oral, DAILY    atorvastatin (LIPITOR) 80 MG tablet TAKE 1 TABLET BY MOUTH EVERY DAY AT NIGHT    carvedilol (COREG) 3.125 MG tablet TAKE 1 TABLET BY MOUTH TWICE A DAY WITH MEALS    diclofenac sodium (VOLTAREN) 2 g, Topical, 4 TIMES DAILY    furosemide (LASIX) 40 mg, Oral, DAILY PRN    gabapentin (NEURONTIN) 300 mg, Oral, 2 TIMES DAILY    mometasone-formoterol (DULERA) 100-5 MCG/ACT inhaler 2 puffs, Inhalation, 2 TIMES DAILY    nitroGLYCERIN (NITROSTAT) 0.4 mg, Sublingual, EVERY 5 MIN PRN    oxyCODONE-acetaminophen (PERCOCET)  MG per tablet 1 tablet, Oral, 3 TIMES DAILY    OXYGEN 2 L, Inhalation    polyethylene glycol (GLYCOLAX) 17 g, Oral, DAILY PRN    potassium chloride (KLOR-CON M) 10 MEQ extended release tablet 10 mEq, Oral, DAILY PRN       /80 (Site: Right Upper Arm, Position: Sitting, Cuff Size: Medium Adult)   Pulse 70   Resp 12   Ht 5' 2\" (1.575 m)   BMI 23.78 kg/m²         Objective:   Physical Exam  Constitutional:       Appearance: She is well-developed. HENT:      Head: Normocephalic. Eyes:      Conjunctiva/sclera: Conjunctivae normal.      Pupils: Pupils are equal, round, and reactive to light. Neck:      Musculoskeletal: Normal range of motion and neck supple. Thyroid: No thyroid mass or thyromegaly. Vascular: No carotid bruit or JVD. Trachea: Trachea normal.   Cardiovascular:      Rate and Rhythm: Normal rate and regular rhythm. Heart sounds: Normal heart sounds. No murmur. No gallop. Pulmonary:      Effort: Pulmonary effort is normal. No respiratory distress. Breath sounds: Normal breath sounds. No wheezing or rales. Abdominal:      General: Bowel sounds are normal. There is no distension. Palpations: Abdomen is soft. There is no hepatomegaly, splenomegaly or mass. Tenderness:  There is no abdominal tenderness. Musculoskeletal:      Right hip: She exhibits decreased range of motion, decreased strength and tenderness. Lymphadenopathy:      Cervical: No cervical adenopathy. Skin:     General: Skin is warm and dry. Findings: No rash. Neurological:      Mental Status: She is alert and oriented to person, place, and time. Cranial Nerves: No cranial nerve deficit. Deep Tendon Reflexes: Reflexes are normal and symmetric. Psychiatric:         Behavior: Behavior normal.         Thought Content: Thought content normal.         Judgment: Judgment normal.         Assessment:       Diagnosis Orders   1. Coronary artery disease involving native coronary artery of native heart without angina pectoris     2. Paroxysmal A-fib ([address]     3. Chronic diastolic CHF (congestive heart failure) ([address]     4. HTN (hypertension), benign     5. COPD, severe ([address]     6. Mixed hyperlipidemia     7. Primary insomnia  ALPRAZolam (XANAX) 0.25 MG tablet   8. DEVI (obstructive sleep apnea)     9. Coronary artery disease of native artery of native heart with stable angina pectoris ([address]     10. Right hip pain            Plan:      #  CAD stable. #  A fib stable. #  Right hip pain. See ortho. #  COPD stable    #  HLD at goal.    #  Insomnia. She almost weaned off Xanax. #  Chronic dCHF stable. #  HTN controlled.           Elridge Rubinstein, MD period.    All opioids tend to cause constipation. Drink plenty of water and eat foods that have a lot of fiber, such as fruits, vegetables, prune juice, apple juice and high fiber cereal.  Take a laxative if you don t move your bowels at least every other day. Miralax , Milk of Magnesia, Colace , or Senna  can be used to keep you regular.      Remember that you can always come back to the Emergency Department if you are not able to see your regular doctor in the amount of time listed above, if you get any new symptoms, or if there is anything that worries you.

## 2020-12-06 ENCOUNTER — HEALTH MAINTENANCE LETTER (OUTPATIENT)
Age: 35
End: 2020-12-06

## 2022-01-15 ENCOUNTER — HEALTH MAINTENANCE LETTER (OUTPATIENT)
Age: 37
End: 2022-01-15

## 2023-04-22 ENCOUNTER — HEALTH MAINTENANCE LETTER (OUTPATIENT)
Age: 38
End: 2023-04-22

## 2023-12-02 ENCOUNTER — HOSPITAL ENCOUNTER (EMERGENCY)
Facility: CLINIC | Age: 38
Discharge: HOME | End: 2023-12-02
Attending: EMERGENCY MEDICINE | Admitting: EMERGENCY MEDICINE

## 2023-12-02 VITALS
HEART RATE: 102 BPM | OXYGEN SATURATION: 98 % | DIASTOLIC BLOOD PRESSURE: 85 MMHG | TEMPERATURE: 98.1 F | SYSTOLIC BLOOD PRESSURE: 127 MMHG | RESPIRATION RATE: 16 BRPM

## 2023-12-02 DIAGNOSIS — R10.9 ABDOMINAL PAIN, UNSPECIFIED ABDOMINAL LOCATION: ICD-10-CM

## 2023-12-02 LAB
ALBUMIN UR-MCNC: NEGATIVE MG/DL
APPEARANCE UR: CLEAR
BILIRUB UR QL STRIP: NEGATIVE
COLOR UR AUTO: ABNORMAL
GLUCOSE UR STRIP-MCNC: NEGATIVE MG/DL
HGB UR QL STRIP: NEGATIVE
KETONES UR STRIP-MCNC: NEGATIVE MG/DL
LEUKOCYTE ESTERASE UR QL STRIP: NEGATIVE
MUCOUS THREADS #/AREA URNS LPF: PRESENT /LPF
NITRATE UR QL: NEGATIVE
PH UR STRIP: 6 [PH] (ref 5–7)
RBC URINE: 1 /HPF
SP GR UR STRIP: 1.02 (ref 1–1.03)
SQUAMOUS EPITHELIAL: 2 /HPF
UROBILINOGEN UR STRIP-MCNC: NORMAL MG/DL
WBC URINE: 2 /HPF

## 2023-12-02 PROCEDURE — 250N000013 HC RX MED GY IP 250 OP 250 PS 637: Performed by: EMERGENCY MEDICINE

## 2023-12-02 PROCEDURE — 81001 URINALYSIS AUTO W/SCOPE: CPT | Performed by: EMERGENCY MEDICINE

## 2023-12-02 PROCEDURE — 99283 EMERGENCY DEPT VISIT LOW MDM: CPT

## 2023-12-02 RX ORDER — OXYCODONE HYDROCHLORIDE 5 MG/1
5 TABLET ORAL ONCE
Status: COMPLETED | OUTPATIENT
Start: 2023-12-02 | End: 2023-12-02

## 2023-12-02 RX ADMIN — OXYCODONE HYDROCHLORIDE 5 MG: 5 TABLET ORAL at 16:45

## 2023-12-02 ASSESSMENT — ACTIVITIES OF DAILY LIVING (ADL): ADLS_ACUITY_SCORE: 35

## 2023-12-02 NOTE — ED TRIAGE NOTES
Pt BIBA from home with c/o lower bad pain. Hx of multiple pelvic surgeries. EMS gave 100mcg Fentanyl, 20g IV in R AC. VSS, A&O x4.

## 2023-12-02 NOTE — ED NOTES
Bed: ED27  Expected date: 12/2/23  Expected time: 4:03 PM  Means of arrival: Ambulance  Comments:  [id number] abd pain ETA now

## 2023-12-02 NOTE — ED PROVIDER NOTES
"  History     Chief Complaint:  Abdominal Pain       HPI   Julia Smith is a 38 year old female with a history of UTI, endometriosis, migraine, and hysterectomy who presents with a \"sharp pulling pain with claws\" feeling like contractions that started when she woke up today. In the last few days, she reports twitching that has caused her to lose sleep. She takes her medications regularly which help her sleep, but last night it was not effective. She usually takes 1 milligram of melatonin and if she takes more she gets nightmares. She states Unisom and Benadryl are ineffective. She reports increased urinary frequency for the last couple days, but no dysuria. She has nausea, but no vomiting. She woke up today with twitching and a headache. Her last bowel movement was this morning at 0300 and it was loose. Denies recent head injury or car accidents. Denies, fever, chills, or cold symptoms, but has a temperature of 99.3 when her usual temperature is 97.7. Denies diarrhea, history of STI, bloody stool, or rash. She just found out many members of her family have severe health issues which has been causing her stress and keeping her up at night.  She does not have any URI symptoms.  No sore throat or ear pain.  No cough.  She reports having a hysterectomy and appendectomy in the past.  She has a history of endometriosis.  She has had numerous surgeries for her endometriosis.    Independent Historian:   None - Patient Only    Review of External Notes:   Reviewed patient's emergency care plan.  7/3/23 from Dr. Garnica where she was seen for migraine.   Patient has had 5 CTAs of abdomen/pelvis this year that were are unremarkable except for some suggested cystitis.     Medications:    Tylenol with codeine   Elavil   Adderall  Klonopin   Valium   Ggabapentin   Lamictal   Ativan   Pyridium   Zoloft     Past Medical History:    Fibromyalgia  PCOS   Anxiety  Endometriosis  Bipolar 2 disorder   Blood type A-  Chronic pain " disorder  Depression  Glucose intolerance   Adopted  Tobacco abuse    Proteinuria  Excessive daytime sleepiness  Syncope  Gastroesophageal reflux disease  Seroma, postoperative  Pyelonephritis  Agoraphobia  Migraine  OCD  Panic attacks  PTSD  TMJ     Past Surgical History:    Past Surgical History:   Procedure Laterality Date    COLONOSCOPY N/A 8/22/2014    Procedure: COLONOSCOPY;  Surgeon: Shannon Solis MD;  Location:  GI    COMBINED CYSTOSCOPY, INSERT CATHETER URETER Bilateral 5/9/2018    Procedure: COMBINED CYSTOSCOPY, INSERT CATHETER URETER;;  Surgeon: Marvin Umana MD;  Location: [facility]    CYSTOSCOPY N/A 9/3/2014    Procedure: CYSTOSCOPY;  Surgeon: Justin Wells MD;  Location: [facility]    CYSTOSCOPY N/A 8/3/2016    Procedure: CYSTOSCOPY;  Surgeon: Justin Wells MD;  Location: [facility]    DAVINCI ASSISTED ABLATION / EXCISION OF ENDOMETRIOSIS N/A 8/3/2016    Procedure: DAVINCI ASSISTED ABLATION / EXCISION OF ENDOMETRIOSIS;  Surgeon: Justin Wells MD;  Location: [facility]    DAVINCI HYSTERECTOMY TOTAL N/A 8/3/2016    Procedure: DAVINCI HYSTERECTOMY TOTAL;  Surgeon: Justin Wells MD;  Location: [facility]    DAVINCI LYSIS OF ADHESIONS N/A 5/9/2018    Procedure: DAVINCI LYSIS OF ADHESIONS;  CYSTOSCOPY, PLACEMENT OF BILATERAL URETERAL CATHETERS (SUE) DAVINCI LAPAROSCOPY, LYSIS ADHESIONS, LEFT URETEROLYSIS (KIN) ;  Surgeon: Justin Wells MD;  Location: [facility]    DAVINCI OOPHORECTOMY Right 7/19/2017    Procedure: DAVINCI OOPHORECTOMY;;  Surgeon: Justin Wells MD;  Location: [facility]    DAVINCI PELVIC PROCEDURE  9/5/2012    Procedure: DAVINCI PELVIC PROCEDURE;  Diagnostic Laparoscopy, Robotic Assisted Endometrial Resection, Tubal dye study ;  Surgeon: Johanny Velásquez DO;  Location: [facility]    DAVINCI PELVIC PROCEDURE N/A 9/3/2014    Procedure: DAVINCI PELVIC PROCEDURE;  Surgeon: Justin Wells MD;  Location: [facility]    DAVINCI PELVIC PROCEDURE Right  7/19/2017    Procedure: DAVINCI PELVIC PROCEDURE;  DAVINCI PELVISCOPY WITH RIGHT OOPHORECTOMY ;  Surgeon: Justin Wells MD;  Location: [facility]    DAVINCI SALPINGECTOMY Right 8/3/2016    Procedure: DAVINCI SALPINGECTOMY;  Surgeon: Justin Wells MD;  Location: [facility]    DAVINCI SALPINGO-OOPHORECTOMY INCLUDING BILATERAL Left 8/3/2016    Procedure: DAVINCI SALPINGO-OOPHORECTOMY INCLUDING BILATERAL;  Surgeon: Justin Wells MD;  Location:  OR    DILATION AND CURETTAGE SUCTION WITH ULTRASOUND GUIDANCE  6/24/2014    Procedure: DILATION AND CURETTAGE SUCTION WITH ULTRASOUND GUIDANCE;  Surgeon: Johanny Velásquez DO;  Location: [facility]    DILATION AND CURETTAGE, HYSTEROSCOPY DIAGNOSTIC, COMBINED N/A 9/3/2014    Procedure: COMBINED DILATION AND CURETTAGE, HYSTEROSCOPY DIAGNOSTIC;  Surgeon: Justin Wells MD;  Location:  OR    HCL PAP SMEAR  12/2006    WNL    LAPAROSCOPIC APPENDECTOMY N/A 9/3/2014    Procedure: LAPAROSCOPIC APPENDECTOMY;  Surgeon: Ángel Duffy MD;  Location:  OR    LAPAROSCOPY DIAGNOSTIC (GYN)  9/5/2012    Procedure: LAPAROSCOPY DIAGNOSTIC (GYN);;  Surgeon: Johanny Velásquez DO;  Location: [facility]        Physical Exam   Patient Vitals for the past 24 hrs:   BP Temp Temp src Pulse Resp SpO2   12/02/23 1639 -- 98.1  F (36.7  C) Oral -- -- --   12/02/23 1612 127/85 99.3  F (37.4  C) Oral 102 16 98 %        Physical Exam  General:  Sitting on bed.  HENT:  No obvious trauma to head  Right Ear:  External ear normal.   Left Ear:  External ear normal.   Nose:  Nose normal.   Eyes:  Conjunctivae and EOM are normal. Pupils are equal, round, and reactive.   Neck: Normal range of motion. Neck supple. No tracheal deviation present.   CV:  Normal heart sounds. No murmur heard.  Pulm/Chest: Effort normal and breath sounds normal.   Abd: Soft. No distension. There is no tenderness. There is no rigidity, no rebound and no guarding.   M/S: Normal range of motion.   Neuro: Awake  and alert.   Skin: Skin is warm and dry. No rash noted. Not diaphoretic.   Psych: Normal mood and affect. Behavior is normal.      Emergency Department Course     Laboratory:  Labs Ordered and Resulted from Time of ED Arrival to Time of ED Departure   ROUTINE UA WITH MICROSCOPIC REFLEX TO CULTURE - Abnormal       Result Value    Color Urine Light Yellow      Appearance Urine Clear      Glucose Urine Negative      Bilirubin Urine Negative      Ketones Urine Negative      Specific Gravity Urine 1.022      Blood Urine Negative      pH Urine 6.0      Protein Albumin Urine Negative      Urobilinogen Urine Normal      Nitrite Urine Negative      Leukocyte Esterase Urine Negative      Mucus Urine Present (*)     RBC Urine 1      WBC Urine 2      Squamous Epithelials Urine 2 (*)       Emergency Department Course & Assessments:    Interventions:  Medications   oxyCODONE (ROXICODONE) tablet 5 mg (5 mg Oral $Given 12/2/23 9056)        Assessments:  1617 I obtained the history and examined the patient as detailed above.     Independent Interpretation (X-rays, CTs, rhythm strip):  None    Consultations/Discussion of Management or Tests:  None     Social Determinants of Health affecting care:   None    Disposition:  The patient was discharged to home.     Impression & Plan      Medical Decision Making:  Julia Smith is a very pleasant 38 year old year old patient who presents to the emergency department with concern of lower abdominal pain.  She describes it as a pulling sensation.  The patient has a history of chronic abdominal pain.  She has a history of endometriosis.  She has had a total hysterectomy.  She has had an appendectomy.  She has a benign, nonsurgical abdomen.  She is hemodynamically stable.  Initial temperature is 99 and recheck oral temperature is 98.1.  Urine analysis shows no evidence of urinary tract infection.  She has no concern for STIs.  No vaginal discharge or bleeding.  She has absolutely no abdominal  tenderness on exam.  She has had 5 CTs in the past month.  She does have a chronic pain plan on file.  She will get a ride home and she was provided 1 oxycodone tablet as above.  No indication for IV narcotics or prescriptions for these.  Recommended close follow-up with her primary care physician.  We discussed reasons to return including any blood in the stool, high fever, etc.  I cannot explain why she had a temperature of 99 degrees initially, was reassured that on recheck it was normal.  The patient had normal bowel movement earlier this morning.  I doubt bowel obstruction.  She reports stools slightly loose, but there is no other GI illness going around the home.  I considered ordering a CT, but I do not believe labs or CT are indicated at this time.  I had a thorough discussion about this with her.  In shared decision making, after reviewing the risk and benefit of a CT, she is in agreement against repeat advanced imaging at this time.  She will return with any worsening symptoms or fever etc.    The treatment plan was discussed with the patient and they expressed understanding of this plan and consented to the plan.  In addition, the patient will return to the emergency department if their symptoms persist, worsen, if new symptoms arise or if there is any concern as other pathology may be present that is not evident at this time. They also understand the importance of close follow up in the clinic and if unable to do so will return to the emergency department for a reevaluation. All questions were answered.      Diagnosis:    ICD-10-CM    1. Abdominal pain, unspecified abdominal location  R10.9            Discharge Medications:  New Prescriptions    No medications on file        Scribe Disclosure:  REESE, Landon Ramirez, am serving as a scribe at 4:44 PM on 12/2/2023 to document services personally performed by Dharmesh Stokes DO based on my observations and the provider's statements to me.   12/2/2023    Dharmesh Stokes DO Anderson, Robert James, DO  12/02/23 5423

## 2024-01-25 NOTE — ED NOTES
[facility] Progress Note           Tierra Simmons Patient Status:  Inpatient    1942 MRN [id number]   Location [address] SURG Attending Judah Cleary MD   Hosp Day # 1 PCP Tanika Bentiez MD     Subjective:      Some low lower abdominal pain today.  Intermittent episodes of bradycardia.        Review of Systems    General: No fever or chills, No loss of appetite.    RS: No hemoptysis  GI: No melena or hematochezia  : No urinary disturbance  Derm: No skin disorders   Heme: No blood dyscrasias.  Remainder of 12 point systems reviewed and negative (or as mentioned in HPI)      Objective:     Medications:  Current Facility-Administered Medications   Medication Dose Route Frequency Provider Last Rate Last Admin    cefTRIAXone (ROCEPHIN) syringe 1,000 mg  1,000 mg Intravenous Daily Judah Cleary MD   1,000 mg at 24 1247    azithromycin (ZITHROMAX) 500 mg in sodium chloride 0.9 % 250 mL IVPB  500 mg Intravenous Daily Judah Cleary  mL/hr at 24 1109 500 mg at 24 1109    Potassium Standard Replacement Protocol (Levels 3.5 and lower)   Does not apply See Admin Instructions Teodoro Duarte MD        Magnesium Standard Replacement Protocol   Does not apply See Admin Instructions Teodoro Duarte MD        pantoprazole (PROTONIX) EC tablet 40 mg  40 mg Oral QAM AC Teodoro Duarte MD   40 mg at 24 0557    sodium chloride 0.9 % injection 2 mL  2 mL Intracatheter 2 times per day Teodoro Duarte MD   2 mL at 24 0821    levothyroxine (SYNTHROID, LEVOTHROID) tablet 88 mcg  88 mcg Oral QAM AC Teodoro Duarte MD   88 mcg at 24 0557    allopurinol (ZYLOPRIM) tablet 100 mg  100 mg Oral Daily Teodoro Duarte MD   100 mg at 24 0816    [Held by provider] lisinopril (ZESTRIL) tablet 20 mg  20 mg Oral Daily Judah Cleary MD   20 mg at 24 0815    clopidogrel (PLAVIX) tablet 75 mg  75 mg Oral Daily Judah Cleary MD   75 mg at 24 0816     PA at bedside to discuss POC with pt.   atorvastatin (LIPITOR) tablet 10 mg  10 mg Oral Nightly Judah Cleary MD   10 mg at 01/24/24 2122    enoxaparin (LOVENOX) injection 40 mg  40 mg Subcutaneous QHS Judah Cleary MD        [Held by provider] chlorthalidone (THALITONE) tablet 25 mg  25 mg Oral Daily Aimee Cordova, CNP   25 mg at 01/25/24 0815      Current Facility-Administered Medications   Medication Dose Route Frequency Provider Last Rate Last Admin      Current Facility-Administered Medications   Medication Dose Route Frequency Provider Last Rate Last Admin    hydrALAZINE (APRESOLINE) injection 10 mg  10 mg Intravenous Q6H PRN Teodoro Duarte MD        acetaminophen (TYLENOL) tablet 650 mg  650 mg Oral Q4H PRN Teodoro Duarte MD   650 mg at 01/25/24 1247    Or    acetaminophen (TYLENOL) suppository 650 mg  650 mg Rectal Q4H PRN Teodoro Duarte MD        ondansetron (ZOFRAN ODT) disintegrating tablet 4 mg  4 mg Oral Q12H PRN Teodoro Duarte MD        Or    ondansetron (ZOFRAN) injection 4 mg  4 mg Intravenous Q12H PRN Teodoro Duarte MD        polyethylene glycol (MIRALAX) packet 17 g  17 g Oral Daily PRN Teodoro Duarte MD   17 g at 01/25/24 1109    melatonin tablet 6 mg  6 mg Oral Nightly PRN Teodoro Duarte MD   6 mg at 01/24/24 2122    calcium carbonate (TUMS) chewable tablet 500 mg  500 mg Oral Q4H PRN Teodoro Duarte MD        sodium chloride 0.9 % flush bag 25 mL  25 mL Intravenous PRN Teodoro Duarte MD            Allergies:   ALLERGIES:  No Known Allergies     Physical Exam:  Vital Last Value 24 Hour Range   Temperature 98.4 °F (36.9 °C) (01/25/24 1526) Temp  Min: 97.5 °F (36.4 °C)  Max: 99.5 °F (37.5 °C)   Pulse (!) 44 (01/25/24 1526) Pulse  Min: 42  Max: 64   Respiratory 18 (01/25/24 1526) Resp  Min: 16  Max: 18   Non-Invasive  Blood Pressure 111/43 (01/25/24 1526) BP  Min: 107/47  Max: 146/48   Pulse Oximetry 97 % (01/25/24 1526) SpO2  Min: 91 %  Max: 99 %   Arterial   Blood Pressure   No data recorded       Intake/Output:      Intake/Output Summary (Last 24 hours) at 1/25/2024 1655  Last data filed at 1/24/2024 1800  Gross per 24 hour   Intake 120 ml   Output --   Net 120 ml       Weight    01/24/24 0433 01/24/24 1101   Weight: 56.8 kg (125 lb 3.5 oz) 56.8 kg (125 lb 3.5 oz)        GENERAL: No apparent distress  HEENT: Normocephalic.  Neck:  Supple neck.   Oral mucosa : Pink and moist.    Endocrine: There is no goiter.  CVS: Regular rate and rhythm.  Normal first and second heart tones.   Lung fields: Clear to auscultation bilaterally.   GI: Soft. Nontender, nondistended.    Lower extremity: No cyanosis, clubbing or edema.   Peripheral vascular: Both lower extremities are warm and well perfused.    Neuro: Awake and alert.  Nonfocal examination.  Psych: Appropriate mood and affect  Integumentary: Warm and Dry      Clinical Data:   (PERSONALLY REVIEWED)    Labs    CBC  Recent Labs   Lab 01/25/24 0432 01/24/24  0618 01/23/24 2356   WBC 9.8 12.1* 12.1*   HCT 28.2* 30.8* 29.7*   HGB 9.3* 10.1* 9.7*    283 297       CMP  Recent Labs   Lab 01/25/24 0432 01/24/24  0618 01/23/24 2356   SODIUM 134* 137 137   POTASSIUM 4.3 3.7 4.0   CHLORIDE 98 101 103   CO2 29 27 28   GLUCOSE 102* 102* 117*   BUN 27* 18 19   CREATININE 1.11* 0.84 0.86   CALCIUM 8.9 8.9 8.8   TOTPROTEIN  --   --  6.0*   ALBUMIN  --   --  3.0*   BILIRUBIN  --   --  0.6   AST  --   --  13   GPT  --   --  13   ALKPT  --   --  79       Cardiac Labs  Recent Labs   Lab 01/23/24  2356   HTROPI 48   NTPROB 7,798*       Lipid Panel  No results found    Coags  Recent Labs   Lab 01/23/24  2356   INR 1.1   PTT 31       ABG  No results found    Imaging    ECG:   Encounter Date: 01/23/24   ECG   Result Value    Ventricular Rate EKG/Min (BPM) 66    Atrial Rate (BPM) 104    QRS-Interval (MSEC) 80    QT-Interval (MSEC) 448    QTc 469    R Axis (Degrees) 71    T Axis (Degrees) 45    REPORT TEXT       **Poor data quality, interpretation may be adversely affected**  Normal sinus  rhythm  with   premature ventricular complexes  Nonspecific ST abnormality  Abnormal ECG  Confirmed by DESIREE CEDILLO DO ([id number]) on 2024 12:31:58 PM          Echocardiogram:  Results for orders placed during the hospital encounter of 24    TRANSTHORACIC ECHO(TTE) COMPLETE W/ W/O IMAGING AGENT    Narrative  *[facility]*  [facility] [address]  [phone]  Transthoracic Echocardiogram (TTE)    Patient: Tierra Simmons      Study Date/Time:       2024 8:01AM  MRN:     [id number]                FIN#:                  [id number]  :     1942             Ht/Wt:                 152cm 44kg  Age:     81                     BSA/BMI:               1.36m^2 19kg/m^2  Gender:  F                      Baseline BP:           120 / 39  Ordering Physician:   Aye Bray    Referring Physician:  Aye Bray Kylie E    Attending Physician:  Judah Cleary    Diagnostic Physician: Tone Canales MD  Sonographer:          Lynda Anaya    ------------------------------------------------------------------------------  INDICATIONS:   Bradycardia.    ------------------------------------------------------------------------------  STUDY CONCLUSIONS  SUMMARY:    1. Left ventricle: The cavity size is normal. Wall thickness is normal.  Systolic function is normal. The ejection fraction was measured by biplane  method of disks. Doppler parameters are consistent with abnormal left  ventricular relaxation (grade 1 diastolic dysfunction). The ejection  fraction is 61%.  2. Aortic valve: There is mild regurgitation.  3. Mitral valve: There is mild regurgitation. Diastolic mitral regurgitation  noted , likely secondary to underlying atrioventricular block.  4. Left atrium: The atrium is mildly dilated.  5. Right ventricle: The cavity size is normal. Systolic function is normal.  6. Inferior vena cava: The IVC is  normal-sized.    ------------------------------------------------------------------------------  STUDY DATA:   Procedure:  A transthoracic echocardiogram was performed. Image  quality was adequate. The study was technically limited due to poor acoustic  window availability.  M-mode, complete 2D, complete spectral Doppler, and  color Doppler.  Study status:  Routine.  Study completion:  There were no  complications.    FINDINGS    BASELINE ECG:   Bradycardia.  LEFT VENTRICLE:  The cavity size is normal. Wall thickness is normal. Systolic  function is normal.    The ejection fraction was measured by biplane method of  disks. The ejection fraction is 61%. The tissue Doppler parameters are  abnormal. Doppler parameters are consistent with abnormal left ventricular  relaxation (grade 1 diastolic dysfunction).    AORTIC VALVE:  Poorly visualized. The annulus is normal. The valve is  structurally normal. The valve is trileaflet. The leaflets are normal  thickness. Cusp separation is normal. Velocity is within the normal range.  There is no stenosis. There is mild regurgitation. The mean systolic gradient  is 4mm Hg. The peak systolic gradient is 7mm Hg. The LVOT to aortic valve VTI  ratio is 1.1. The valve area is 3.8cm^2. The valve area index is 2.79cm^2/m^2.  The ratio of LVOT to aortic valve peak velocity is 0.69. The valve area is  2.4cm^2. The valve area index is 1.75cm^2/m^2.    AORTA:  Aortic root: The root is normal-sized and 2.8cm diameter.    MITRAL VALVE:  Well visualized. The annulus is mildly calcified. The leaflets  are normal thickness. Leaflet separation is normal. Inflow velocity is within  the normal range. There is no evidence for stenosis. There is mild  regurgitation. Diastolic mitral regurgitation noted , likely secondary to  underlying atrioventricular block. The mean diastolic gradient is 6mm Hg. The  peak diastolic gradient is 2mm Hg. The valve area (LVOT continuity) is  2.6cm^2. The valve area  index (LVOT continuity) is 1.87cm^2/m^2.    ATRIAL SEPTUM:  Well visualized. No defect or patent foramen ovale is  identified.    LEFT ATRIUM:  Well visualized. The atrium is mildly dilated.    RIGHT VENTRICLE:  Not well visualized. The cavity size is normal. Systolic  function is normal.       The RV pressure during systole is 26mm Hg.    PULMONIC VALVE:   Not well visualized. The valve is structurally normal.  Velocity is within the normal range. There is no evidence for stenosis. There  is no significant regurgitation.    TRICUSPID VALVE:  Not well visualized. The valve is structurally normal.  Leaflet separation is normal. Inflow velocity is within the normal range.  There is no evidence for stenosis. There is trivial regurgitation.    RIGHT ATRIUM:  Not well visualized. The atrium is normal in size.       The  estimated central venous pressure is 3mm Hg.    PERICARDIUM:   There is no pericardial effusion.    SYSTEMIC VEINS:  Inferior vena cava: The IVC is normal-sized.  Respirophasic diameter changes  are in the normal range (>= 50%).    ------------------------------------------------------------------------------  Measurements    Left ventricle            Value             Ref         Left atrium continued     Value          Ref  LEVI, LAX chord        (L) 3.7   cm          3.8 - 5.2   Area/bsa ES, A2C          11.32 cm^2/m^2 ---------  ESD, LAX chord        (N) 2.7   cm          2.2 - 3.5   Vol, S                (N) 31    ml       22 - 52  LEVI/bsa, LAX chord    (N) 2.7   cm/m^2      2.3 - 3.1   Vol/bsa, S            (N) 23    ml/m^2   16 - 34  ESD/bsa, LAX chord    (N) 2.0   cm/m^2      1.3 - 2.1   Vol, ES, 1-p A4C      (N) 31    ml       22 - 52  PW, ED, LAX           (H) 1.0   cm          0.6 - 0.9   Vol/bsa, ES, 1-p A4C  (N) 23    ml/m^2   11 - 40  LEVI major ax, A4C         6.9   cm          ----------  Vol, ES, 1-p A2C      (N) 37    ml       22 - 52  ESD major ax, A4C         5.5   cm          ----------   Vol/bsa, ES, 1-p A2C  (N) 27    ml/m^2   13 - 40  FS major axis, A4C        21    %           ----------  Vol, ES, 2-p              37    ml       ---------  LEVI/bsa major ax, A4C     5.1   cm/m^2      ----------  Vol/bsa, ES, 2-p      (N) 27    ml/m^2   16 - 34  ESD/bsa major ax, A4C     4.0   cm/m^2      ----------  DANIEL, A4C                  21.2  cm^2        ----------  Aortic valve              Value          Ref  DEVIKA, A4C                  10.8  cm^2        ----------  Peak v, S                 1.3   m/sec    ---------  FAC, A4C                  49    %           ----------  Mean v, S                 0.91  m/sec    ---------  IVS, ED               (H) 1.0   cm          0.6 - 0.9   Mean grad, S              4     mm Hg    ---------  PW, ED                (H) 1.0   cm          0.6 - 0.9   Peak grad, S              7     mm Hg    ---------  IVS/PW, ED                1                 ----------  LVOT/AV, VTI ratio        1.1            ---------  EDV                   (N) 51    ml          46 - 106    BAILEY, VTI                  3.8   cm^2     ---------  ESV                   (N) 20    ml          14 - 42     BAILEY/bsa, VTI              2.79  cm^2/m^2 ---------  EF                    (N) 61    %           54 - 74     LVOT/AV, Vpeak ratio      0.69           ---------  SV                        31    ml          ----------  BAILEY, Vmax                 2.4   cm^2     ---------  EDV/bsa               (N) 37    ml/m^2      29 - 61     BAILEY/bsa, Vmax             1.75  cm^2/m^2 ---------  ESV/bsa               (N) 14    ml/m^2      8 - 24      AR ED v                   2.55  m/s      ---------  SV/bsa                    23    ml/m^2      ----------  AR decel                  94.2  cm/s^2   ---------  SV, 1-p A4C               35    ml          ----------  AR PHT                    794   ms       ---------  SV/bsa, 1-p A4C           26    ml/m^2      ----------  AR ED grad                26    mm Hg    ---------  EDV, 2-p               (N) 56    ml          46 - 106  ESV, 2-p              (N) 22    ml          14 - 42     Mitral valve              Value          Ref  SV, 2-p                   35    ml          ----------  Vicenta diam                  2.2   cm       ---------  EDV/bsa, 2-p          (N) 41    ml/m^2      29 - 61     Mean v, D                 1.15  m/sec    ---------  ESV/bsa, 2-p          (N) 16    ml/m^2      8 - 24      Peak E                    0.79  m/sec    ---------  SV/bsa, 2-p               25.5  ml/m^2      ----------  Peak A                    1.36  m/sec    ---------  E', lat vicenta, TDI      (L) 6.7   cm/sec      >=10.0      VTI leaflet coapt         39.9  cm       ---------  E/e', lat vicenta, TDI    (N) 12                <=13        MiV/LVOT VTI              1.4            ---------  E', med vicenta, TDI      (L) 5.4   cm/sec      >=7.0       Decel time                362   ms       ---------  E/e', med vicenta, TDI        15                ----------  Mean grad, D              6     mm Hg    ---------  E', avg, TDI              6.09  cm/sec      ----------  Peak grad, D              2     mm Hg    ---------  E/e', avg, TDI        (N) 13                <=14        Peak E/A ratio            0.6            ---------  A-VTI                     39.9  cm       ---------  LVOT                      Value             Ref         MVA, LVOT cont            2.6   cm^2     ---------  Diam, S                   2.1   cm          ----------  MVA/bsa, LVOT cont        1.87  cm^2/m^2 ---------  Area                      3.5   cm^2        ----------  MR peak v                 4.8   m/sec    ---------  Peak anoop, S               0.92  m/sec       ----------  Peak LV-LA grad S         92    mm Hg    ---------  Peak grad, S              3     mm Hg       ----------  Max MR v                  4.8   m/sec    ---------  Qs                        3.8   L/min       ----------  Peak LV-LA grad S         92    mm Hg    ---------  Qs/bsa                     2.8   L/(min-m^2) ----------  Regurg VTI                177.0 cm       ---------    Right ventricle           Value             Ref         Tricuspid valve           Value          Ref  LEVI, LAX                  2.6   cm          ----------  TR peak v             (N) 2.4   m/sec    <=2.8  TAPSE, MM             (L) 1.6   cm          >=1.7       Peak RV-RA grad, S        23    mm Hg    ---------  Pressure, S               26    mm Hg       ----------  S' lateral            (N) 10.4  cm/sec      6.0 - 13.4  Aortic root               Value          Ref  Root diam, ED         (N) 2.8   cm       2.2 - 3.7  Left atrium               Value             Ref  AP dim, ES            (N) 3.3   cm          2.7 - 3.8   Pulmonary artery          Value          Ref  AP dim index, ES      (H) 2.4   cm/m^2      1.5 - 2.3   Pressure, S               26    mm Hg    ---------  Area ES, A4C          (N) 13    cm^2        <=20  Area/bsa ES, A4C          9.63  cm^2/m^2    ----------  Systemic veins            Value          Ref  Area ES, A2C              15    cm^2        ----------  Estimated CVP             3     mm Hg    ---------  Legend:  (L)  and  (H)  tyrell values outside specified reference range.    (N)  marks values inside specified reference range.    Prepared and electronically signed by  Tone Canales MD  01/09/2024 14:32       Cardiac cath:   No results found for this or any previous visit.    Assessment and Plan:      Expand All Collapse All               [facility] Consultation / Initial Visit       Today's Date: 1/24/2024     PCP: Tanika Benitez MD  Referring Provider: Tanika Benitez MD     INDICATION FOR CONSULTATION: Acute decompensated congestive heart failure     Telugu video  utilized: Romy #[id number]     HPI:        81 year old female presents with a chief complaint of shortness of breath.   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     Patient presented with symptoms of shortness of breath.  Location is lungs.  Duration is now resolved.  Severity is mild.  Associated with exertional shortness of breath with decreased activity tolerance.  Also associated with headache and dizziness.  In the context of mildly acute decompensated congestive heart failure.  Additionally, patient was found to be bradycardic on telemetry.  She was seen by our services during her recent hospitalization where she presented with abdominal pain.  Was found to have a type I secondary heart block with intermittent 2-1 heart block on telemetry.  During that hospitalization she was started on dobutamine drip due to bradycardia but patient was completely asymptomatic and hemodynamic stable.  Workup included TSH, cardiac biomarkers and transthoracic echocardiogram which were all reassuring.  Was presumed symptoms were increased due to vagal tone from abdominal pain and she was discharged home with recommendations for outpatient cardiology follow-up.  Due to acute decompensated congestive heart failure and bradycardia, our cardiology services was asked to evaluate.     She has documented history of essential hypertension.  During recent hospitalization, due to low normal/normotensive blood pressures, antihypertensives were discontinued.  Patient reported that at the time of symptoms, blood pressure was assessed and was found to be elevated in the 180s systolically.  Reports usual blood pressure range in the 130s.  She has had some outpatient events of bradycardia with heart rate in the 50s through chart review.     ROS:      General: No fever or chills, No loss of appetite.    RS: No hemoptysis  GI: No melena or hematochezia  : No urinary  disturbance  Derm: No skin disorders   Heme: No blood dyscrasias.  Remainder of 12 point systems reviewed and negative (or as mentioned in HPI)     Relevant Past Medical History:  History - past medical        Past Medical History:   Diagnosis Date    Abnormal EKG 12/03/2018    Atypical chest pain 12/03/2018    Chronic kidney disease      Closed Colles' fracture of left radius with routine healing 12/21/2020    Essential (primary) hypertension      Grieving 1/18/2021    High cholesterol      Thyroid condition           History - past surgical         Past Surgical History:   Procedure Laterality Date    Cataract extraction w/  intraocular lens implant        Gallbladder surgery                Social History:  Social History          Tobacco Use   Smoking Status Never   Smokeless Tobacco Never      Social History           Substance and Sexual Activity   Alcohol Use Yes     Comment: social          History   Drug Use No         Family History:   History - family         Family History   Problem Relation Age of Onset    Heart disease Mother      Patient is unaware of any medical problems Father              Inpatient Medications            Current Facility-Administered Medications   Medication Dose Route Frequency Provider Last Rate Last Admin    furosemide (LASIX INJECT) injection 20 mg  20 mg Intravenous BID Teodoro Duarte MD        Potassium Standard Replacement Protocol (Levels 3.5 and lower)   Does not apply See Admin Instructions Teodoro Duarte MD        Magnesium Standard Replacement Protocol   Does not apply See Admin Instructions Teodoro Duarte MD        pantoprazole (PROTONIX) EC tablet 40 mg  40 mg Oral QAM AC Teodoro Duarte MD   40 mg at 01/24/24 0559    sodium chloride 0.9 % injection 2 mL  2 mL Intracatheter 2 times per day Teodoro Duarte MD        [Held by provider] enoxaparin (LOVENOX) injection 40 mg  40 mg Subcutaneous Daily Teodoro Duarte MD        levothyroxine (SYNTHROID, LEVOTHROID)  tablet 88 mcg  88 mcg Oral QAM AC Teodoro Duarte MD        allopurinol (ZYLOPRIM) tablet 100 mg  100 mg Oral Daily Teodoro Duarte MD        lisinopril (ZESTRIL) tablet 20 mg  20 mg Oral Daily Judah Cleary MD                   Current Facility-Administered Medications   Medication Dose Route Frequency Provider Last Rate Last Admin                Current Facility-Administered Medications   Medication Dose Route Frequency Provider Last Rate Last Admin    hydrALAZINE (APRESOLINE) injection 10 mg  10 mg Intravenous Q6H PRN Teodoro Duarte MD        acetaminophen (TYLENOL) tablet 650 mg  650 mg Oral Q4H PRN Teodoro Duarte MD         Or    acetaminophen (TYLENOL) suppository 650 mg  650 mg Rectal Q4H PRN Teodoro Duarte MD        ondansetron (ZOFRAN ODT) disintegrating tablet 4 mg  4 mg Oral Q12H PRN Teodoro Duarte MD         Or    ondansetron (ZOFRAN) injection 4 mg  4 mg Intravenous Q12H PRN Teodoro Duarte MD        polyethylene glycol (MIRALAX) packet 17 g  17 g Oral Daily PRN Teodoro Duarte MD        melatonin tablet 6 mg  6 mg Oral Nightly PRN Teodoro Duarte MD        calcium carbonate (TUMS) chewable tablet 500 mg  500 mg Oral Q4H PRN Teodoro Duarte MD        sodium chloride 0.9 % flush bag 25 mL  25 mL Intravenous PRN Teodoro Duarte MD             Allergy   ALLERGIES:  No Known Allergies            Examination:       Vital Last Value 24 Hour Range   Temperature (!) 100.8 °F (38.2 °C) (01/24/24 0907) Temp  Min: 98.3 °F (36.8 °C)  Max: 100.8 °F (38.2 °C)   Pulse (!) 46 (01/24/24 0907) Pulse  Min: 42  Max: 69   Respiratory 18 (01/24/24 0907) Resp  Min: 17  Max: 19   Non-Invasive  Blood Pressure 131/54 (01/24/24 0907) BP  Min: 130/108  Max: 175/67   Pulse Oximetry 96 % (01/24/24 0907) SpO2  Min: 92 %  Max: 99 %   Arterial   Blood Pressure  No data recorded      Tele: Second-degree type I AV block, intermittent 2-1 with narrow complex     No intake or output data in the 24 hours ending 01/24/24 1417           Weight     01/24/24 0433   Weight: 56.8 kg (125 lb 3.5 oz)            GENERAL: No apparent distress  HEENT: Normocephalic.  Neck:  Supple neck.   Oral mucosa : Pink and moist.    Endocrine: There is no goiter.  CVS: Bradycardic with irregular rhythm.  (+) murmurs  Lung fields: Clear to auscultation bilaterally.   GI: Soft. Nontender, nondistended.    Lower extremity: No cyanosis, clubbing or edema.   Peripheral vascular: Both lower extremities are warm and well perfused.    Neuro: Awake and alert.  Nonfocal examination.  Psych: Appropriate mood and affect  Integumentary: Warm and Dry        Clinical Data:       The following were personally visualized and interpreted by me:     LABS:     CBC       Recent Labs   Lab 01/24/24 0618 01/23/24  2356   WBC 12.1* 12.1*   HCT 30.8* 29.7*   HGB 10.1* 9.7*    297         CMP       Recent Labs   Lab 01/24/24  0618 01/23/24  2356   SODIUM 137 137   POTASSIUM 3.7 4.0   CHLORIDE 101 103   CO2 27 28   GLUCOSE 102* 117*   BUN 18 19   CREATININE 0.84 0.86   CALCIUM 8.9 8.8   TOTPROTEIN  --  6.0*   ALBUMIN  --  3.0*   BILIRUBIN  --  0.6   AST  --  13   GPT  --  13   ALKPT  --  79         Cardiac Labs      Recent Labs   Lab 01/23/24  2356   HTROPI 48   NTPROB 7,798*         Lipid Panel  No results found     Coags      Recent Labs   Lab 01/23/24  2356   INR 1.1   PTT 31         ABG  No results found     IMAGING:     ECG:         Encounter Date: 01/08/24   Electrocardiogram 12-Lead   Result Value     Ventricular Rate EKG/Min (BPM) 34     Atrial Rate (BPM) 68     MD-Interval (MSEC) 252     QRS-Interval (MSEC) 84     QT-Interval (MSEC) 576     QTc 433     P Axis (Degrees) 83     R Axis (Degrees) 59     T Axis (Degrees) 64     REPORT TEXT         Sinus rhythm  with 2nd degree AV block  with 2:1 AV conduction  Abnormal ECG  Confirmed by OCTAVIANO VARGAS MD ([id number]) on 1/9/2024 4:25:23 PM         Chest x-ray:  FINDINGS with impression:  New bilateral interstitial thickening most  suggestive of pulmonary edema.  Normal heart size. Atherosclerosis.     Low bone density potentially osteoporosis. Consider DEXA.     EKG.       CT of the head:  IMPRESSION:     1.   Atrophy and changes consistent with mild chronic ischemic  microvascular disease.  2.   No definite acute abnormalities are identified.     CTA of the chest:  IMPRESSION:     1.   No evidence of pulmonary embolism.  2.   Probable infectious/inflammatory changes in the left upper lobe.  Correlate clinically.  3.   Cardiomegaly and atherosclerosis. Aortic valve calcifications.  4.   Small bilateral pleural effusions.     Echocardiogram:  Results for orders placed during the hospital encounter of 24     TRANSTHORACIC ECHO(TTE) COMPLETE W/ W/O IMAGING AGENT     Narrative  *[facility]*  [address] [place] [id number] [phone]  Transthoracic Echocardiogram (TTE)     Patient: Tierra Simmons      Study Date/Time:       2024 8:01AM  MRN:     [id number]                FIN#:                  [id number]  :     1942             Ht/Wt:                 152cm 44kg  Age:     81                     BSA/BMI:               1.36m^2 19kg/m^2  Gender:  F                      Baseline BP:           120 / 39  Ordering Physician:   Aye Bray     Referring Physician:  Aye Bray Kylie E     Attending Physician:  Judah Cleary     Diagnostic Physician: Tone Canales MD  Sonographer:          Lynda Anaya     ------------------------------------------------------------------------------  INDICATIONS:   Bradycardia.     ------------------------------------------------------------------------------  STUDY CONCLUSIONS  SUMMARY:     1. Left ventricle: The cavity size is normal. Wall thickness is normal.  Systolic function is normal. The ejection fraction was measured by biplane  method of disks. Doppler parameters are consistent with abnormal left  ventricular relaxation (grade 1 diastolic dysfunction).  The ejection  fraction is 61%.  2. Aortic valve: There is mild regurgitation.  3. Mitral valve: There is mild regurgitation. Diastolic mitral regurgitation  noted , likely secondary to underlying atrioventricular block.  4. Left atrium: The atrium is mildly dilated.  5. Right ventricle: The cavity size is normal. Systolic function is normal.  6. Inferior vena cava: The IVC is normal-sized.     ------------------------------------------------------------------------------  STUDY DATA:   Procedure:  A transthoracic echocardiogram was performed. Image  quality was adequate. The study was technically limited due to poor acoustic  window availability.  M-mode, complete 2D, complete spectral Doppler, and  color Doppler.  Study status:  Routine.  Study completion:  There were no  complications.     FINDINGS     BASELINE ECG:   Bradycardia.  LEFT VENTRICLE:  The cavity size is normal. Wall thickness is normal. Systolic  function is normal.    The ejection fraction was measured by biplane method of  disks. The ejection fraction is 61%. The tissue Doppler parameters are  abnormal. Doppler parameters are consistent with abnormal left ventricular  relaxation (grade 1 diastolic dysfunction).     AORTIC VALVE:  Poorly visualized. The annulus is normal. The valve is  structurally normal. The valve is trileaflet. The leaflets are normal  thickness. Cusp separation is normal. Velocity is within the normal range.  There is no stenosis. There is mild regurgitation. The mean systolic gradient  is 4mm Hg. The peak systolic gradient is 7mm Hg. The LVOT to aortic valve VTI  ratio is 1.1. The valve area is 3.8cm^2. The valve area index is 2.79cm^2/m^2.  The ratio of LVOT to aortic valve peak velocity is 0.69. The valve area is  2.4cm^2. The valve area index is 1.75cm^2/m^2.     AORTA:  Aortic root: The root is normal-sized and 2.8cm diameter.     MITRAL VALVE:  Well visualized. The annulus is mildly calcified. The leaflets  are normal  thickness. Leaflet separation is normal. Inflow velocity is within  the normal range. There is no evidence for stenosis. There is mild  regurgitation. Diastolic mitral regurgitation noted , likely secondary to  underlying atrioventricular block. The mean diastolic gradient is 6mm Hg. The  peak diastolic gradient is 2mm Hg. The valve area (LVOT continuity) is  2.6cm^2. The valve area index (LVOT continuity) is 1.87cm^2/m^2.     ATRIAL SEPTUM:  Well visualized. No defect or patent foramen ovale is  identified.     LEFT ATRIUM:  Well visualized. The atrium is mildly dilated.     RIGHT VENTRICLE:  Not well visualized. The cavity size is normal. Systolic  function is normal.       The RV pressure during systole is 26mm Hg.     PULMONIC VALVE:   Not well visualized. The valve is structurally normal.  Velocity is within the normal range. There is no evidence for stenosis. There  is no significant regurgitation.     TRICUSPID VALVE:  Not well visualized. The valve is structurally normal.  Leaflet separation is normal. Inflow velocity is within the normal range.  There is no evidence for stenosis. There is trivial regurgitation.     RIGHT ATRIUM:  Not well visualized. The atrium is normal in size.       The  estimated central venous pressure is 3mm Hg.     PERICARDIUM:   There is no pericardial effusion.     SYSTEMIC VEINS:  Inferior vena cava: The IVC is normal-sized.  Respirophasic diameter changes  are in the normal range (>= 50%).     ------------------------------------------------------------------------------  Measurements     Left ventricle            Value             Ref         Left atrium continued     Value          Ref  LEVI, LAX chord        (L) 3.7   cm          3.8 - 5.2   Area/bsa ES, A2C          11.32 cm^2/m^2 ---------  ESD, LAX chord        (N) 2.7   cm          2.2 - 3.5   Vol, S                (N) 31    ml       22 - 52  LEVI/bsa, LAX chord    (N) 2.7   cm/m^2      2.3 - 3.1   Vol/bsa, S            (N)  23    ml/m^2   16 - 34  ESD/bsa, LAX chord    (N) 2.0   cm/m^2      1.3 - 2.1   Vol, ES, 1-p A4C      (N) 31    ml       22 - 52  PW, ED, LAX           (H) 1.0   cm          0.6 - 0.9   Vol/bsa, ES, 1-p A4C  (N) 23    ml/m^2   11 - 40  LEVI major ax, A4C         6.9   cm          ----------  Vol, ES, 1-p A2C      (N) 37    ml       22 - 52  ESD major ax, A4C         5.5   cm          ----------  Vol/bsa, ES, 1-p A2C  (N) 27    ml/m^2   13 - 40  FS major axis, A4C        21    %           ----------  Vol, ES, 2-p              37    ml       ---------  LEVI/bsa major ax, A4C     5.1   cm/m^2      ----------  Vol/bsa, ES, 2-p      (N) 27    ml/m^2   16 - 34  ESD/bsa major ax, A4C     4.0   cm/m^2      ----------  DANIEL, A4C                  21.2  cm^2        ----------  Aortic valve              Value          Ref  DEVIKA, A4C                  10.8  cm^2        ----------  Peak v, S                 1.3   m/sec    ---------  FAC, A4C                  49    %           ----------  Mean v, S                 0.91  m/sec    ---------  IVS, ED               (H) 1.0   cm          0.6 - 0.9   Mean grad, S              4     mm Hg    ---------  PW, ED                (H) 1.0   cm          0.6 - 0.9   Peak grad, S              7     mm Hg    ---------  IVS/PW, ED                1                 ----------  LVOT/AV, VTI ratio        1.1            ---------  EDV                   (N) 51    ml          46 - 106    BAILEY, VTI                  3.8   cm^2     ---------  ESV                   (N) 20    ml          14 - 42     BAILEY/bsa, VTI              2.79  cm^2/m^2 ---------  EF                    (N) 61    %           54 - 74     LVOT/AV, Vpeak ratio      0.69           ---------  SV                        31    ml          ----------  BAILEY, Vmax                 2.4   cm^2     ---------  EDV/bsa               (N) 37    ml/m^2      29 - 61     BAILEY/bsa, Vmax             1.75  cm^2/m^2 ---------  ESV/bsa               (N) 14    ml/m^2      8 -  24      AR ED v                   2.55  m/s      ---------  SV/bsa                    23    ml/m^2      ----------  AR decel                  94.2  cm/s^2   ---------  SV, 1-p A4C               35    ml          ----------  AR PHT                    794   ms       ---------  SV/bsa, 1-p A4C           26    ml/m^2      ----------  AR ED grad                26    mm Hg    ---------  EDV, 2-p              (N) 56    ml          46 - 106  ESV, 2-p              (N) 22    ml          14 - 42     Mitral valve              Value          Ref  SV, 2-p                   35    ml          ----------  Vicenta diam                  2.2   cm       ---------  EDV/bsa, 2-p          (N) 41    ml/m^2      29 - 61     Mean v, D                 1.15  m/sec    ---------  ESV/bsa, 2-p          (N) 16    ml/m^2      8 - 24      Peak E                    0.79  m/sec    ---------  SV/bsa, 2-p               25.5  ml/m^2      ----------  Peak A                    1.36  m/sec    ---------  E', lat vicenta, TDI      (L) 6.7   cm/sec      >=10.0      VTI leaflet coapt         39.9  cm       ---------  E/e', lat vicenta, TDI    (N) 12                <=13        MiV/LVOT VTI              1.4            ---------  E', med vicenta, TDI      (L) 5.4   cm/sec      >=7.0       Decel time                362   ms       ---------  E/e', med vicenta, TDI        15                ----------  Mean grad, D              6     mm Hg    ---------  E', avg, TDI              6.09  cm/sec      ----------  Peak grad, D              2     mm Hg    ---------  E/e', avg, TDI        (N) 13                <=14        Peak E/A ratio            0.6            ---------  A-VTI                     39.9  cm       ---------  LVOT                      Value             Ref         MVA, LVOT cont            2.6   cm^2     ---------  Diam, S                   2.1   cm          ----------  MVA/bsa, LVOT cont        1.87  cm^2/m^2 ---------  Area                      3.5   cm^2        ----------  MR  peak v                 4.8   m/sec    ---------  Peak anoop, S               0.92  m/sec       ----------  Peak LV-LA grad S         92    mm Hg    ---------  Peak grad, S              3     mm Hg       ----------  Max MR v                  4.8   m/sec    ---------  Qs                        3.8   L/min       ----------  Peak LV-LA grad S         92    mm Hg    ---------  Qs/bsa                    2.8   L/(min-m^2) ----------  Regurg VTI                177.0 cm       ---------     Right ventricle           Value             Ref         Tricuspid valve           Value          Ref  LEVI, LAX                  2.6   cm          ----------  TR peak v             (N) 2.4   m/sec    <=2.8  TAPSE, MM             (L) 1.6   cm          >=1.7       Peak RV-RA grad, S        23    mm Hg    ---------  Pressure, S               26    mm Hg       ----------  S' lateral            (N) 10.4  cm/sec      6.0 - 13.4  Aortic root               Value          Ref  Root diam, ED         (N) 2.8   cm       2.2 - 3.7  Left atrium               Value             Ref  AP dim, ES            (N) 3.3   cm          2.7 - 3.8   Pulmonary artery          Value          Ref  AP dim index, ES      (H) 2.4   cm/m^2      1.5 - 2.3   Pressure, S               26    mm Hg    ---------  Area ES, A4C          (N) 13    cm^2        <=20  Area/bsa ES, A4C          9.63  cm^2/m^2    ----------  Systemic veins            Value          Ref  Area ES, A2C              15    cm^2        ----------  Estimated CVP             3     mm Hg    ---------  Legend:  (L)  and  (H)  tyrell values outside specified reference range.     (N)  marks values inside specified reference range.     Prepared and electronically signed by  Tone Canales MD  01/09/2024 14:32     Lexiscan stress test 2018:   FINDINGS AND IMPRESSION : Normal size left ventricle.     No scintigraphic evidence of ischemia.     Left ventricle ejection fraction is 90%.     Wall motion appears to be  unremarkable.      EKG report will be dictated separately by the cardiology department.                 Transcribed By: CORAL   11/07/18 11:33 am     Dictated By:            JANKI BOWERS MD     Electronically Reviewed and Approved By:           JANKI BOWERS MD  11/07/18 11:39 am     Cath Report:  No results found for this or any previous visit.     Twelve-lead EKG:    Tests reviewed and interpreted by me revealed:   EKG reveals sinus rhythm with probable blocked PACs in a pattern of bigeminy versus Mobitz type II AV block.  Pertinent lab values reveal potassium 3.7, normal creatinine, troponin at 48, BNP 7700, white blood cell count 12.1, hemoglobin 10.1 and platelets 283,000.  Assessment/Plans:      Acute decompensated congestive heart failure with preserved ejection fraction  -Mildly decompensated with exertional dyspnea.  Likely in the setting of hypertensive urgency.  Blood pressure in the 170s on admission.  Patient reported blood pressures in the 180s at home associated with headache and dizziness  -Previous antihypertensives were discontinued during recent hospitalization due to low normal normotensive blood pressures but this was in the setting of poor oral intake due to abdominal pain from small mesenteric artery stenosis.  Symptoms have since been controlled since starting clopidogrel with future plans for intervention in the next couple weeks  -Echocardiogram from recent hospitalization with preserved left ventricular function with mild mitral and aortic insufficiency  -Has symptomatically improved with IV Lasix and blood pressure control  -Back on patient's GDMT and doing well.     Second-degree AV block, chronic  -Asymptomatic   -Mobitz type I  -Was found to have asymptomatic bradycardia and second-degree AV block during previous hospitalization with presentation of abdominal pain.  Currently asymptomatic but remains bradycardic.  -Has had intermittent Wenckebach and second-degree to 1 with narrow  complex  -Denies any previous symptoms of lightness, dizziness, near-syncope, or syncope.  -No history of ischemic heart disease or heart failure.  -Has history of hypothyroidism.  TFTs within normal range on levothyroxine  -Cardiac biomarkers reassuring  -Echo done from recent admission reviewed myself revealed normal LV/RV systolic function with evidence of diastolic mitral regurgitation secondary to AV block.  -Reviewed previous outpatient visits and patient has had borderline heart rates as low as in the 50s.  She is currently not on any rate lowering medications in outpatient setting.  Overall patient hemodynamically stable.  -Continue telemetry.  Stay off of AV gilda blocking agents for now.  Patient's abdominal discomfort possibly from the SMA stenosis may be contributing to patient's bradycardia from increased vagal activity.  Would also send patient home with a 30-day event monitor to try and correlate very brief symptoms with bradycardic episodes.    Small mesenteric artery stenosis  -Plans for outpatient work-up per interventional radiology services  -on clopidogrel  -May be contributing to patient's abdominal discomfort     Essential hypertension  -Blood pressure elevated on admission in the 170s but has improved with resumption of lisinopril  -Hypertension was discontinued from previous admission due to low normal blood pressure/normotension but this was in the setting of poor oral intake due to above  -Controlled     Hyperlipidemia  -Continue atorvastatin     Hypothyroidism  -TFTs reviewed and levels within normal range  -Continue levothyroxine              Thank you for allowing us to participate in this patient's care.  Please do not hesitate to call with any questions or concerns.

## 2024-06-23 ENCOUNTER — HEALTH MAINTENANCE LETTER (OUTPATIENT)
Age: 39
End: 2024-06-23

## 2025-07-12 ENCOUNTER — HEALTH MAINTENANCE LETTER (OUTPATIENT)
Age: 40
End: 2025-07-12

## (undated) DEVICE — ESU GROUND PAD UNIVERSAL W/O CORD

## (undated) DEVICE — LINEN TOWEL PACK X5 5464

## (undated) DEVICE — CATH URETERAL FLEX TIP TIGERTAIL 06FRX70CM 139006

## (undated) DEVICE — SU SILK 2-0 TIE 18' A185H

## (undated) DEVICE — RX SURGIFLO HEMOSTATIC MATRIX W/THROMBIN 8ML NEXTGEN 2993

## (undated) DEVICE — TUBING CONMED AIRSEAL SMOKE EVAC INSUFFLATION ASM-EVAC

## (undated) DEVICE — DAVINCI S ESU FCP BIPOLAR MARYLAND 420172

## (undated) DEVICE — SU VICRYL 4-0 PS-2 18" UND J496H

## (undated) DEVICE — ESU CORD BIPOLAR 12' E0509

## (undated) DEVICE — ESU CORD MONOPOLAR 10'  E0510

## (undated) DEVICE — CATH TRAY FOLEY SURESTEP 16FR WDRAIN BAG STLK LATEX A300316A

## (undated) DEVICE — BARRIER INTERCEED 3X4" 4350

## (undated) DEVICE — SOL NACL 0.9% IRRIG 1000ML BOTTLE 07138-09

## (undated) DEVICE — SUCTION IRR TRUMPET VALVE LAP ASU1201

## (undated) DEVICE — DAVINCI S CAUTERY HOOK 420183

## (undated) DEVICE — CABLE HIGH FREQEUCY STERILE 8MM PLUG 300CM 277KB-D/10

## (undated) DEVICE — BLADE CLIPPER 4406

## (undated) DEVICE — GLOVE PROTEXIS W/NEU-THERA 7.5  2D73TE75

## (undated) DEVICE — DAVINCI SI DRAPE ACCESSORY KIT 3-ARM 420290

## (undated) DEVICE — SURGICEL POWDER ABSORBABLE HEMOSTAT 3GM 3013SP

## (undated) DEVICE — SUCTION CANISTER MEDIVAC LINER 3000ML W/LID 65651-530

## (undated) DEVICE — PACK DAVINCI GYN SMA15GDFS1

## (undated) DEVICE — DAVINCI S MONOPOLAR SCISSORS PRECURVED HOT SHEARS 420179

## (undated) DEVICE — SU VICRYL 3-0 SH 27" J316H

## (undated) DEVICE — DAVINCI S FCP BIPOLAR FENESTRATED 420205

## (undated) DEVICE — KIT PATIENT POSITIONING PIGAZZI LATEX FREE 40580

## (undated) DEVICE — SUCTION IRR STRYKERFLOW II W/TIP 250-070-520

## (undated) DEVICE — GLOVE PROTEXIS BLUE W/NEU-THERA 7.0  2D73EB70

## (undated) DEVICE — GUIDEWIRE SENSOR DUAL FLEX STR 0.035"X150CM M0066703080

## (undated) DEVICE — DAVINCI HOT SHEARS TIP COVER  400180

## (undated) DEVICE — APPLICATOR EVICEL RIGID TIP 35CM 3908

## (undated) DEVICE — ENDO TROCAR CONMED AIRSEAL BLADELESS 08X120MM IAS8-120LP

## (undated) DEVICE — ENDO APPLICATOR SURGIFLO 1995

## (undated) DEVICE — SOL NACL 0.9% INJ 1000ML BAG 2B1324X

## (undated) DEVICE — PAD CHUX UNDERPAD 23X24" 7136

## (undated) DEVICE — ENDO TROCAR FIRST ENTRY KII FIOS Z-THRD 05X100MM CTF03

## (undated) DEVICE — SOL ADH LIQUID BENZOIN SWAB 0.6ML C1544

## (undated) DEVICE — TUBING IRRIG CYSTO/BLADDER SET 81" LF 2C4040

## (undated) DEVICE — DAVINCI FORCEPS PK DISSECTING CORD  400229

## (undated) DEVICE — HEMOSTAT ABSORBABLE ARISTA 5GM SM0007-USA

## (undated) DEVICE — DAVINCI OBTURATOR 8MM BLADELESS 420023

## (undated) DEVICE — DAVINCI S CANNULA SEAL 8.5-13MM 420206

## (undated) DEVICE — GLOVE PROTEXIS W/NEU-THERA 7.0  2D73TE70

## (undated) DEVICE — SOL WATER IRRIG 1000ML BOTTLE 2F7114

## (undated) RX ORDER — LIDOCAINE HYDROCHLORIDE 20 MG/ML
INJECTION, SOLUTION EPIDURAL; INFILTRATION; INTRACAUDAL; PERINEURAL
Status: DISPENSED
Start: 2018-05-09

## (undated) RX ORDER — ACETAMINOPHEN 325 MG/1
TABLET ORAL
Status: DISPENSED
Start: 2017-07-19

## (undated) RX ORDER — BUPIVACAINE HYDROCHLORIDE 2.5 MG/ML
INJECTION, SOLUTION EPIDURAL; INFILTRATION; INTRACAUDAL
Status: DISPENSED
Start: 2018-05-09

## (undated) RX ORDER — FENTANYL CITRATE 50 UG/ML
INJECTION, SOLUTION INTRAMUSCULAR; INTRAVENOUS
Status: DISPENSED
Start: 2018-05-09

## (undated) RX ORDER — LIDOCAINE HYDROCHLORIDE 20 MG/ML
INJECTION, SOLUTION EPIDURAL; INFILTRATION; INTRACAUDAL; PERINEURAL
Status: DISPENSED
Start: 2017-07-19

## (undated) RX ORDER — FENTANYL CITRATE 50 UG/ML
INJECTION, SOLUTION INTRAMUSCULAR; INTRAVENOUS
Status: DISPENSED
Start: 2017-07-19

## (undated) RX ORDER — BUPIVACAINE HYDROCHLORIDE AND EPINEPHRINE 2.5; 5 MG/ML; UG/ML
INJECTION, SOLUTION EPIDURAL; INFILTRATION; INTRACAUDAL; PERINEURAL
Status: DISPENSED
Start: 2017-07-19

## (undated) RX ORDER — CELECOXIB 200 MG/1
CAPSULE ORAL
Status: DISPENSED
Start: 2018-05-09

## (undated) RX ORDER — PROPOFOL 10 MG/ML
INJECTION, EMULSION INTRAVENOUS
Status: DISPENSED
Start: 2018-05-09

## (undated) RX ORDER — CEFAZOLIN SODIUM 2 G/100ML
INJECTION, SOLUTION INTRAVENOUS
Status: DISPENSED
Start: 2018-05-09

## (undated) RX ORDER — GLYCOPYRROLATE 0.2 MG/ML
INJECTION, SOLUTION INTRAMUSCULAR; INTRAVENOUS
Status: DISPENSED
Start: 2017-07-19

## (undated) RX ORDER — NEOSTIGMINE METHYLSULFATE 1 MG/ML
VIAL (ML) INJECTION
Status: DISPENSED
Start: 2018-05-09

## (undated) RX ORDER — GLYCOPYRROLATE 0.2 MG/ML
INJECTION, SOLUTION INTRAMUSCULAR; INTRAVENOUS
Status: DISPENSED
Start: 2018-05-09

## (undated) RX ORDER — CEFAZOLIN SODIUM 2 G/100ML
INJECTION, SOLUTION INTRAVENOUS
Status: DISPENSED
Start: 2017-07-19

## (undated) RX ORDER — ACETAMINOPHEN 500 MG
TABLET ORAL
Status: DISPENSED
Start: 2018-05-09

## (undated) RX ORDER — HYDROXYZINE HYDROCHLORIDE 25 MG/1
TABLET, FILM COATED ORAL
Status: DISPENSED
Start: 2018-05-09

## (undated) RX ORDER — ONDANSETRON 2 MG/ML
INJECTION INTRAMUSCULAR; INTRAVENOUS
Status: DISPENSED
Start: 2018-05-09

## (undated) RX ORDER — DIPHENHYDRAMINE HYDROCHLORIDE 50 MG/ML
INJECTION INTRAMUSCULAR; INTRAVENOUS
Status: DISPENSED
Start: 2018-05-09

## (undated) RX ORDER — EPINEPHRINE 1 MG/ML
INJECTION, SOLUTION INTRAMUSCULAR; SUBCUTANEOUS
Status: DISPENSED
Start: 2018-05-09

## (undated) RX ORDER — PROPOFOL 10 MG/ML
INJECTION, EMULSION INTRAVENOUS
Status: DISPENSED
Start: 2017-07-19

## (undated) RX ORDER — ONDANSETRON 2 MG/ML
INJECTION INTRAMUSCULAR; INTRAVENOUS
Status: DISPENSED
Start: 2017-07-19

## (undated) RX ORDER — DEXAMETHASONE SODIUM PHOSPHATE 4 MG/ML
INJECTION, SOLUTION INTRA-ARTICULAR; INTRALESIONAL; INTRAMUSCULAR; INTRAVENOUS; SOFT TISSUE
Status: DISPENSED
Start: 2017-07-19